# Patient Record
Sex: FEMALE | URBAN - METROPOLITAN AREA
[De-identification: names, ages, dates, MRNs, and addresses within clinical notes are randomized per-mention and may not be internally consistent; named-entity substitution may affect disease eponyms.]

---

## 2022-02-28 ENCOUNTER — APPOINTMENT (RX ONLY)
Dept: URBAN - METROPOLITAN AREA CLINIC 164 | Facility: CLINIC | Age: 72
Setting detail: DERMATOLOGY
End: 2022-02-28

## 2022-02-28 DIAGNOSIS — D22 MELANOCYTIC NEVI: ICD-10-CM

## 2022-02-28 DIAGNOSIS — D18.0 HEMANGIOMA: ICD-10-CM

## 2022-02-28 DIAGNOSIS — L81.4 OTHER MELANIN HYPERPIGMENTATION: ICD-10-CM

## 2022-02-28 DIAGNOSIS — L82.1 OTHER SEBORRHEIC KERATOSIS: ICD-10-CM

## 2022-02-28 PROBLEM — D22.5 MELANOCYTIC NEVI OF TRUNK: Status: ACTIVE | Noted: 2022-02-28

## 2022-02-28 PROBLEM — D18.01 HEMANGIOMA OF SKIN AND SUBCUTANEOUS TISSUE: Status: ACTIVE | Noted: 2022-02-28

## 2022-02-28 PROCEDURE — ? COUNSELING

## 2022-02-28 PROCEDURE — ? FULL BODY SKIN EXAM

## 2022-02-28 PROCEDURE — 99203 OFFICE O/P NEW LOW 30 MIN: CPT

## 2022-02-28 ASSESSMENT — LOCATION DETAILED DESCRIPTION DERM
LOCATION DETAILED: RIGHT MID-UPPER BACK
LOCATION DETAILED: RIGHT SUPERIOR UPPER BACK
LOCATION DETAILED: LEFT MID-UPPER BACK
LOCATION DETAILED: EPIGASTRIC SKIN

## 2022-02-28 ASSESSMENT — LOCATION SIMPLE DESCRIPTION DERM
LOCATION SIMPLE: ABDOMEN
LOCATION SIMPLE: RIGHT UPPER BACK
LOCATION SIMPLE: LEFT UPPER BACK

## 2022-02-28 ASSESSMENT — LOCATION ZONE DERM: LOCATION ZONE: TRUNK

## 2022-02-28 NOTE — HPI: EVALUATION OF SKIN LESION(S)
What Type Of Note Output Would You Prefer (Optional)?: Bullet Format
Hpi Title: Evaluation of Skin Lesions
How Severe Are Your Spot(S)?: mild
Have Your Spot(S) Been Treated In The Past?: has not been treated
Additional History: Patient is here for a full body exam

## 2024-01-25 ENCOUNTER — APPOINTMENT (RX ONLY)
Dept: URBAN - METROPOLITAN AREA CLINIC 164 | Facility: CLINIC | Age: 74
Setting detail: DERMATOLOGY
End: 2024-01-25

## 2024-01-25 DIAGNOSIS — D22 MELANOCYTIC NEVI: ICD-10-CM

## 2024-01-25 DIAGNOSIS — Z12.83 ENCOUNTER FOR SCREENING FOR MALIGNANT NEOPLASM OF SKIN: ICD-10-CM

## 2024-01-25 DIAGNOSIS — L81.4 OTHER MELANIN HYPERPIGMENTATION: ICD-10-CM

## 2024-01-25 DIAGNOSIS — L82.0 INFLAMED SEBORRHEIC KERATOSIS: ICD-10-CM

## 2024-01-25 DIAGNOSIS — D18.0 HEMANGIOMA: ICD-10-CM

## 2024-01-25 PROBLEM — D18.01 HEMANGIOMA OF SKIN AND SUBCUTANEOUS TISSUE: Status: ACTIVE | Noted: 2024-01-25

## 2024-01-25 PROBLEM — D22.5 MELANOCYTIC NEVI OF TRUNK: Status: ACTIVE | Noted: 2024-01-25

## 2024-01-25 PROCEDURE — ? FULL BODY SKIN EXAM

## 2024-01-25 PROCEDURE — ? COUNSELING

## 2024-01-25 PROCEDURE — ? LIQUID NITROGEN

## 2024-01-25 PROCEDURE — 99213 OFFICE O/P EST LOW 20 MIN: CPT | Mod: 25

## 2024-01-25 PROCEDURE — 17110 DESTRUCTION B9 LES UP TO 14: CPT

## 2024-01-25 ASSESSMENT — LOCATION SIMPLE DESCRIPTION DERM
LOCATION SIMPLE: RIGHT FOREHEAD
LOCATION SIMPLE: ABDOMEN
LOCATION SIMPLE: CHEST
LOCATION SIMPLE: SCALP
LOCATION SIMPLE: LEFT UPPER BACK
LOCATION SIMPLE: RIGHT UPPER BACK

## 2024-01-25 ASSESSMENT — LOCATION DETAILED DESCRIPTION DERM
LOCATION DETAILED: PERIUMBILICAL SKIN
LOCATION DETAILED: LEFT LATERAL SUPERIOR CHEST
LOCATION DETAILED: RIGHT CENTRAL PARIETAL SCALP
LOCATION DETAILED: RIGHT SUPERIOR FOREHEAD
LOCATION DETAILED: RIGHT MID-UPPER BACK
LOCATION DETAILED: LEFT MID-UPPER BACK

## 2024-01-25 ASSESSMENT — LOCATION ZONE DERM
LOCATION ZONE: FACE
LOCATION ZONE: SCALP
LOCATION ZONE: TRUNK

## 2024-01-25 NOTE — PROCEDURE: LIQUID NITROGEN
Render Note In Bullet Format When Appropriate: No
Medical Necessity Information: It is in your best interest to select a reason for this procedure from the list below. All of these items fulfill various CMS LCD requirements except the new and changing color options.
Spray Paint Text: The liquid nitrogen was applied to the skin utilizing a spray paint frosting technique.
Show Spray Paint Technique Variable?: Yes
Medical Necessity Clause: This procedure was medically necessary because the lesions that were treated were:
Post-Care Instructions: I reviewed with the patient in detail post-care instructions. Patient is to wear sunprotection, and avoid picking at any of the treated lesions. Pt may apply Vaseline to crusted or scabbing areas.
Consent: The patient's consent was obtained including but not limited to risks of crusting, scabbing, blistering, scarring, darker or lighter pigmentary change, recurrence, incomplete removal and infection.
Detail Level: Detailed

## 2024-06-23 ENCOUNTER — HOSPITAL ENCOUNTER (INPATIENT)
Facility: CLINIC | Age: 74
LOS: 11 days | Discharge: HOME | DRG: 329 | End: 2024-07-06
Admitting: INTERNAL MEDICINE
Payer: MEDICARE

## 2024-06-23 ENCOUNTER — APPOINTMENT (OUTPATIENT)
Dept: CT IMAGING | Facility: CLINIC | Age: 74
DRG: 329 | End: 2024-06-23
Payer: MEDICARE

## 2024-06-23 DIAGNOSIS — K56.609 SMALL BOWEL OBSTRUCTION (H): ICD-10-CM

## 2024-06-23 DIAGNOSIS — D72.829 LEUKOCYTOSIS: ICD-10-CM

## 2024-06-23 DIAGNOSIS — R91.8 PULMONARY NODULES: ICD-10-CM

## 2024-06-23 DIAGNOSIS — K52.9 ENTEROCOLITIS: Primary | ICD-10-CM

## 2024-06-23 DIAGNOSIS — R73.9 HYPERGLYCEMIA: ICD-10-CM

## 2024-06-23 DIAGNOSIS — R19.7 DIARRHEA, UNSPECIFIED TYPE: ICD-10-CM

## 2024-06-23 LAB
ALBUMIN SERPL BCG-MCNC: 4.1 G/DL (ref 3.5–5.2)
ALP SERPL-CCNC: 63 U/L (ref 40–150)
ALT SERPL W P-5'-P-CCNC: 13 U/L (ref 0–50)
ANION GAP SERPL CALCULATED.3IONS-SCNC: 14 MMOL/L (ref 7–15)
AST SERPL W P-5'-P-CCNC: 21 U/L (ref 0–45)
BASOPHILS # BLD AUTO: 0 10E3/UL (ref 0–0.2)
BASOPHILS NFR BLD AUTO: 0 %
BILIRUB SERPL-MCNC: 0.2 MG/DL
BUN SERPL-MCNC: 12.7 MG/DL (ref 8–23)
CALCIUM SERPL-MCNC: 9.2 MG/DL (ref 8.8–10.2)
CHLORIDE SERPL-SCNC: 101 MMOL/L (ref 98–107)
CREAT SERPL-MCNC: 0.89 MG/DL (ref 0.51–0.95)
DEPRECATED HCO3 PLAS-SCNC: 22 MMOL/L (ref 22–29)
EGFRCR SERPLBLD CKD-EPI 2021: 68 ML/MIN/1.73M2
EOSINOPHIL # BLD AUTO: 0.2 10E3/UL (ref 0–0.7)
EOSINOPHIL NFR BLD AUTO: 2 %
ERYTHROCYTE [DISTWIDTH] IN BLOOD BY AUTOMATED COUNT: 13.2 % (ref 10–15)
GLUCOSE SERPL-MCNC: 218 MG/DL (ref 70–99)
HCT VFR BLD AUTO: 35.5 % (ref 35–47)
HGB BLD-MCNC: 11.4 G/DL (ref 11.7–15.7)
HOLD SPECIMEN: NORMAL
IMM GRANULOCYTES # BLD: 0 10E3/UL
IMM GRANULOCYTES NFR BLD: 0 %
LACTATE SERPL-SCNC: 1 MMOL/L (ref 0.7–2)
LIPASE SERPL-CCNC: 65 U/L (ref 13–60)
LYMPHOCYTES # BLD AUTO: 1.7 10E3/UL (ref 0.8–5.3)
LYMPHOCYTES NFR BLD AUTO: 15 %
MCH RBC QN AUTO: 30 PG (ref 26.5–33)
MCHC RBC AUTO-ENTMCNC: 32.1 G/DL (ref 31.5–36.5)
MCV RBC AUTO: 93 FL (ref 78–100)
MONOCYTES # BLD AUTO: 0.3 10E3/UL (ref 0–1.3)
MONOCYTES NFR BLD AUTO: 2 %
NEUTROPHILS # BLD AUTO: 9.1 10E3/UL (ref 1.6–8.3)
NEUTROPHILS NFR BLD AUTO: 80 %
NRBC # BLD AUTO: 0 10E3/UL
NRBC BLD AUTO-RTO: 0 /100
PLATELET # BLD AUTO: 357 10E3/UL (ref 150–450)
POTASSIUM SERPL-SCNC: 4.5 MMOL/L (ref 3.4–5.3)
PROT SERPL-MCNC: 6.7 G/DL (ref 6.4–8.3)
RBC # BLD AUTO: 3.8 10E6/UL (ref 3.8–5.2)
SODIUM SERPL-SCNC: 137 MMOL/L (ref 135–145)
WBC # BLD AUTO: 11.4 10E3/UL (ref 4–11)

## 2024-06-23 PROCEDURE — 250N000011 HC RX IP 250 OP 636: Mod: JZ

## 2024-06-23 PROCEDURE — 36415 COLL VENOUS BLD VENIPUNCTURE: CPT | Performed by: EMERGENCY MEDICINE

## 2024-06-23 PROCEDURE — 96374 THER/PROPH/DIAG INJ IV PUSH: CPT

## 2024-06-23 PROCEDURE — 250N000013 HC RX MED GY IP 250 OP 250 PS 637: Performed by: PHYSICIAN ASSISTANT

## 2024-06-23 PROCEDURE — 250N000011 HC RX IP 250 OP 636: Mod: JZ | Performed by: EMERGENCY MEDICINE

## 2024-06-23 PROCEDURE — 96375 TX/PRO/DX INJ NEW DRUG ADDON: CPT

## 2024-06-23 PROCEDURE — 36415 COLL VENOUS BLD VENIPUNCTURE: CPT

## 2024-06-23 PROCEDURE — 250N000011 HC RX IP 250 OP 636

## 2024-06-23 PROCEDURE — 74177 CT ABD & PELVIS W/CONTRAST: CPT

## 2024-06-23 PROCEDURE — 99222 1ST HOSP IP/OBS MODERATE 55: CPT | Mod: AI | Performed by: PHYSICIAN ASSISTANT

## 2024-06-23 PROCEDURE — 80053 COMPREHEN METABOLIC PANEL: CPT | Performed by: EMERGENCY MEDICINE

## 2024-06-23 PROCEDURE — 250N000011 HC RX IP 250 OP 636: Mod: JZ | Performed by: PHYSICIAN ASSISTANT

## 2024-06-23 PROCEDURE — 99285 EMERGENCY DEPT VISIT HI MDM: CPT | Mod: 25

## 2024-06-23 PROCEDURE — 96361 HYDRATE IV INFUSION ADD-ON: CPT

## 2024-06-23 PROCEDURE — G0378 HOSPITAL OBSERVATION PER HR: HCPCS

## 2024-06-23 PROCEDURE — 83605 ASSAY OF LACTIC ACID: CPT

## 2024-06-23 PROCEDURE — 85025 COMPLETE CBC W/AUTO DIFF WBC: CPT | Performed by: EMERGENCY MEDICINE

## 2024-06-23 PROCEDURE — 258N000003 HC RX IP 258 OP 636: Mod: JZ | Performed by: EMERGENCY MEDICINE

## 2024-06-23 PROCEDURE — 96376 TX/PRO/DX INJ SAME DRUG ADON: CPT

## 2024-06-23 PROCEDURE — 85025 COMPLETE CBC W/AUTO DIFF WBC: CPT

## 2024-06-23 PROCEDURE — 258N000003 HC RX IP 258 OP 636: Mod: JZ | Performed by: PHYSICIAN ASSISTANT

## 2024-06-23 PROCEDURE — 83690 ASSAY OF LIPASE: CPT

## 2024-06-23 PROCEDURE — 80053 COMPREHEN METABOLIC PANEL: CPT

## 2024-06-23 RX ORDER — ACETAMINOPHEN 325 MG/1
650 TABLET ORAL EVERY 4 HOURS PRN
Status: DISCONTINUED | OUTPATIENT
Start: 2024-06-23 | End: 2024-06-24

## 2024-06-23 RX ORDER — CALCIUM CARB/VITAMIN D3/VIT K1 500-500-40
2 TABLET,CHEWABLE ORAL EVERY MORNING
COMMUNITY

## 2024-06-23 RX ORDER — ONDANSETRON 2 MG/ML
4 INJECTION INTRAMUSCULAR; INTRAVENOUS EVERY 6 HOURS PRN
Status: DISCONTINUED | OUTPATIENT
Start: 2024-06-23 | End: 2024-07-06 | Stop reason: HOSPADM

## 2024-06-23 RX ORDER — SERTRALINE HYDROCHLORIDE 100 MG/1
100 TABLET, FILM COATED ORAL EVERY MORNING
Status: DISCONTINUED | OUTPATIENT
Start: 2024-06-24 | End: 2024-07-06 | Stop reason: HOSPADM

## 2024-06-23 RX ORDER — NALOXONE HYDROCHLORIDE 0.4 MG/ML
0.4 INJECTION, SOLUTION INTRAMUSCULAR; INTRAVENOUS; SUBCUTANEOUS
Status: DISCONTINUED | OUTPATIENT
Start: 2024-06-23 | End: 2024-07-06 | Stop reason: HOSPADM

## 2024-06-23 RX ORDER — HYDROXYZINE HYDROCHLORIDE 50 MG/1
50 TABLET, FILM COATED ORAL EVERY 6 HOURS PRN
COMMUNITY
Start: 2024-06-21

## 2024-06-23 RX ORDER — HYDROXYZINE HYDROCHLORIDE 50 MG/1
50 TABLET, FILM COATED ORAL EVERY 6 HOURS PRN
Status: DISCONTINUED | OUTPATIENT
Start: 2024-06-23 | End: 2024-06-23

## 2024-06-23 RX ORDER — LACTOBACILLUS RHAMNOSUS GG 10B CELL
1 CAPSULE ORAL 2 TIMES DAILY
Status: DISCONTINUED | OUTPATIENT
Start: 2024-06-23 | End: 2024-07-06 | Stop reason: HOSPADM

## 2024-06-23 RX ORDER — ACETAMINOPHEN 650 MG/1
650 SUPPOSITORY RECTAL EVERY 4 HOURS PRN
Status: DISCONTINUED | OUTPATIENT
Start: 2024-06-23 | End: 2024-06-24

## 2024-06-23 RX ORDER — PROCHLORPERAZINE MALEATE 5 MG
5 TABLET ORAL EVERY 6 HOURS PRN
Status: DISCONTINUED | OUTPATIENT
Start: 2024-06-23 | End: 2024-07-06 | Stop reason: HOSPADM

## 2024-06-23 RX ORDER — GABAPENTIN 300 MG/1
900 CAPSULE ORAL 3 TIMES DAILY
COMMUNITY
Start: 2024-04-15

## 2024-06-23 RX ORDER — HYDROMORPHONE HCL IN WATER/PF 6 MG/30 ML
0.2 PATIENT CONTROLLED ANALGESIA SYRINGE INTRAVENOUS
Status: DISCONTINUED | OUTPATIENT
Start: 2024-06-23 | End: 2024-07-06 | Stop reason: HOSPADM

## 2024-06-23 RX ORDER — SERTRALINE HYDROCHLORIDE 100 MG/1
100 TABLET, FILM COATED ORAL EVERY MORNING
COMMUNITY
Start: 2023-05-25

## 2024-06-23 RX ORDER — UREA 10 %
1 LOTION (ML) TOPICAL EVERY MORNING
COMMUNITY
Start: 2023-06-21 | End: 2024-06-23

## 2024-06-23 RX ORDER — PROCHLORPERAZINE 25 MG
12.5 SUPPOSITORY, RECTAL RECTAL EVERY 12 HOURS PRN
Status: DISCONTINUED | OUTPATIENT
Start: 2024-06-23 | End: 2024-07-06 | Stop reason: HOSPADM

## 2024-06-23 RX ORDER — IOPAMIDOL 755 MG/ML
90 INJECTION, SOLUTION INTRAVASCULAR ONCE
Status: COMPLETED | OUTPATIENT
Start: 2024-06-23 | End: 2024-06-23

## 2024-06-23 RX ORDER — ONDANSETRON 2 MG/ML
4 INJECTION INTRAMUSCULAR; INTRAVENOUS ONCE
Status: COMPLETED | OUTPATIENT
Start: 2024-06-23 | End: 2024-06-23

## 2024-06-23 RX ORDER — NALOXONE HYDROCHLORIDE 0.4 MG/ML
0.2 INJECTION, SOLUTION INTRAMUSCULAR; INTRAVENOUS; SUBCUTANEOUS
Status: DISCONTINUED | OUTPATIENT
Start: 2024-06-23 | End: 2024-07-06 | Stop reason: HOSPADM

## 2024-06-23 RX ORDER — PROPRANOLOL HYDROCHLORIDE 20 MG/1
20 TABLET ORAL 3 TIMES DAILY PRN
COMMUNITY
Start: 2024-06-21

## 2024-06-23 RX ORDER — GABAPENTIN 300 MG/1
900 CAPSULE ORAL 3 TIMES DAILY
Status: DISCONTINUED | OUTPATIENT
Start: 2024-06-23 | End: 2024-07-06 | Stop reason: HOSPADM

## 2024-06-23 RX ORDER — ACETAMINOPHEN 500 MG
1000 TABLET ORAL EVERY 6 HOURS
Status: ON HOLD | COMMUNITY
End: 2024-07-06

## 2024-06-23 RX ORDER — SODIUM CHLORIDE 9 MG/ML
INJECTION, SOLUTION INTRAVENOUS CONTINUOUS
Status: ACTIVE | OUTPATIENT
Start: 2024-06-23 | End: 2024-06-24

## 2024-06-23 RX ORDER — MORPHINE SULFATE 4 MG/ML
4 INJECTION, SOLUTION INTRAMUSCULAR; INTRAVENOUS ONCE
Status: COMPLETED | OUTPATIENT
Start: 2024-06-23 | End: 2024-06-23

## 2024-06-23 RX ORDER — HYDROMORPHONE HYDROCHLORIDE 2 MG/1
2 TABLET ORAL EVERY 4 HOURS PRN
Status: DISCONTINUED | OUTPATIENT
Start: 2024-06-23 | End: 2024-07-06 | Stop reason: HOSPADM

## 2024-06-23 RX ORDER — METOCLOPRAMIDE HYDROCHLORIDE 5 MG/ML
10 INJECTION INTRAMUSCULAR; INTRAVENOUS ONCE
Status: COMPLETED | OUTPATIENT
Start: 2024-06-23 | End: 2024-06-23

## 2024-06-23 RX ORDER — ACETAMINOPHEN 500 MG
1000 TABLET ORAL EVERY 6 HOURS
Status: DISCONTINUED | OUTPATIENT
Start: 2024-06-23 | End: 2024-06-30 | Stop reason: ALTCHOICE

## 2024-06-23 RX ORDER — PROPRANOLOL HYDROCHLORIDE 20 MG/1
20 TABLET ORAL 3 TIMES DAILY PRN
Status: DISCONTINUED | OUTPATIENT
Start: 2024-06-23 | End: 2024-07-06 | Stop reason: HOSPADM

## 2024-06-23 RX ORDER — HYDROMORPHONE HCL IN WATER/PF 6 MG/30 ML
0.4 PATIENT CONTROLLED ANALGESIA SYRINGE INTRAVENOUS
Status: DISCONTINUED | OUTPATIENT
Start: 2024-06-23 | End: 2024-07-06 | Stop reason: HOSPADM

## 2024-06-23 RX ORDER — ONDANSETRON 4 MG/1
4 TABLET, ORALLY DISINTEGRATING ORAL EVERY 6 HOURS PRN
Status: DISCONTINUED | OUTPATIENT
Start: 2024-06-23 | End: 2024-07-06 | Stop reason: HOSPADM

## 2024-06-23 RX ORDER — ONDANSETRON 2 MG/ML
4 INJECTION INTRAMUSCULAR; INTRAVENOUS
Status: COMPLETED | OUTPATIENT
Start: 2024-06-23 | End: 2024-06-23

## 2024-06-23 RX ORDER — MULTIVIT-MINERALS/FOLIC ACID 200 MCG
2 TABLET,CHEWABLE ORAL DAILY
COMMUNITY
End: 2024-06-23

## 2024-06-23 RX ADMIN — ONDANSETRON 4 MG: 2 INJECTION INTRAMUSCULAR; INTRAVENOUS at 17:44

## 2024-06-23 RX ADMIN — SODIUM CHLORIDE: 9 INJECTION, SOLUTION INTRAVENOUS at 14:35

## 2024-06-23 RX ADMIN — GABAPENTIN 900 MG: 300 CAPSULE ORAL at 20:34

## 2024-06-23 RX ADMIN — IOPAMIDOL 90 ML: 755 INJECTION, SOLUTION INTRAVENOUS at 09:39

## 2024-06-23 RX ADMIN — HYDROMORPHONE HYDROCHLORIDE 0.4 MG: 0.2 INJECTION, SOLUTION INTRAMUSCULAR; INTRAVENOUS; SUBCUTANEOUS at 14:41

## 2024-06-23 RX ADMIN — ACETAMINOPHEN 1000 MG: 500 TABLET, FILM COATED ORAL at 20:33

## 2024-06-23 RX ADMIN — SODIUM CHLORIDE 1000 ML: 9 INJECTION, SOLUTION INTRAVENOUS at 08:34

## 2024-06-23 RX ADMIN — METOCLOPRAMIDE 10 MG: 5 INJECTION, SOLUTION INTRAMUSCULAR; INTRAVENOUS at 11:22

## 2024-06-23 RX ADMIN — PROPRANOLOL HYDROCHLORIDE 20 MG: 10 TABLET ORAL at 20:36

## 2024-06-23 RX ADMIN — ONDANSETRON 4 MG: 2 INJECTION INTRAMUSCULAR; INTRAVENOUS at 08:33

## 2024-06-23 RX ADMIN — PROCHLORPERAZINE MALEATE 5 MG: 5 TABLET ORAL at 21:28

## 2024-06-23 RX ADMIN — ONDANSETRON 4 MG: 2 INJECTION INTRAMUSCULAR; INTRAVENOUS at 09:25

## 2024-06-23 RX ADMIN — ONDANSETRON 4 MG: 4 TABLET, ORALLY DISINTEGRATING ORAL at 23:53

## 2024-06-23 RX ADMIN — Medication 1 CAPSULE: at 20:34

## 2024-06-23 RX ADMIN — GABAPENTIN 900 MG: 300 CAPSULE ORAL at 14:41

## 2024-06-23 RX ADMIN — ACETAMINOPHEN 1000 MG: 500 TABLET, FILM COATED ORAL at 14:40

## 2024-06-23 RX ADMIN — MORPHINE SULFATE 4 MG: 4 INJECTION, SOLUTION INTRAMUSCULAR; INTRAVENOUS at 09:25

## 2024-06-23 RX ADMIN — HYDROMORPHONE HYDROCHLORIDE 1 MG: 2 TABLET ORAL at 21:28

## 2024-06-23 RX ADMIN — SODIUM CHLORIDE: 9 INJECTION, SOLUTION INTRAVENOUS at 23:43

## 2024-06-23 RX ADMIN — HYDROMORPHONE HYDROCHLORIDE 0.2 MG: 0.2 INJECTION, SOLUTION INTRAMUSCULAR; INTRAVENOUS; SUBCUTANEOUS at 17:45

## 2024-06-23 ASSESSMENT — ACTIVITIES OF DAILY LIVING (ADL)
DRESSING/BATHING_DIFFICULTY: NO
ADLS_ACUITY_SCORE: 35
FALL_HISTORY_WITHIN_LAST_SIX_MONTHS: NO
ADLS_ACUITY_SCORE: 35
ADLS_ACUITY_SCORE: 35
CONCENTRATING,_REMEMBERING_OR_MAKING_DECISIONS_DIFFICULTY: NO
ADLS_ACUITY_SCORE: 35
CHANGE_IN_FUNCTIONAL_STATUS_SINCE_ONSET_OF_CURRENT_ILLNESS/INJURY: NO
ADLS_ACUITY_SCORE: 35
ADLS_ACUITY_SCORE: 35
DOING_ERRANDS_INDEPENDENTLY_DIFFICULTY: NO
ADLS_ACUITY_SCORE: 35
ADLS_ACUITY_SCORE: 35
DIFFICULTY_EATING/SWALLOWING: NO
ADLS_ACUITY_SCORE: 35
ADLS_ACUITY_SCORE: 33
TOILETING_ISSUES: NO
ADLS_ACUITY_SCORE: 35
WALKING_OR_CLIMBING_STAIRS_DIFFICULTY: NO
ADLS_ACUITY_SCORE: 35
ADLS_ACUITY_SCORE: 35

## 2024-06-23 ASSESSMENT — COLUMBIA-SUICIDE SEVERITY RATING SCALE - C-SSRS
6. HAVE YOU EVER DONE ANYTHING, STARTED TO DO ANYTHING, OR PREPARED TO DO ANYTHING TO END YOUR LIFE?: NO
1. IN THE PAST MONTH, HAVE YOU WISHED YOU WERE DEAD OR WISHED YOU COULD GO TO SLEEP AND NOT WAKE UP?: NO
2. HAVE YOU ACTUALLY HAD ANY THOUGHTS OF KILLING YOURSELF IN THE PAST MONTH?: NO

## 2024-06-23 NOTE — ED PROVIDER NOTES
Emergency Department Note      History of Present Illness     Chief Complaint  Abdominal Pain and Nausea, Vomiting, & Diarrhea    HPI  Yoselin Zelaya is a 73 year old female with history of gastric bypass ~15 years ago (Soren en Y) who presents at the emergency department with abdominal pain, nausea, vomiting, and diarrhea. The patient reports that at 0300 this morning she experienced a sudden onset of abdominal pain, vomiting, nausea, and diarrhea.  Spouse and son in law with similar symptoms. Patient denies blood in vomit or stool and fevers. She mentions a blepharoplasty surgery was performed last week, and that she underwent gastric bypass surgery about 20 years ago. Patient is not on antibiotics.  No dysuria, hematuria or urinary frequency.    Independent Historian   as detailed above.    Review of External Notes  Pre operative visit from 6/13/24.    Past Medical History   Medical History and Problem List  Depression   Osteoporosis   Subarachnoid hemorrhage  Iron deficiency anemia   TIA  Migraine  Asthma    Medications  Fosamax  Sinequan  Zoloft  Inderal  Heriberto     Surgical History   Soren-en-y  Bilateral breast implants  Lumbar fusion  Carpal tunnel release   Cervical fusion  Hysterectomy   Appendectomy   Tonsillectomy   Dilation and curettage   Rotator cuff repair     Physical Exam   Patient Vitals for the past 24 hrs:   BP Temp Temp src Pulse Resp SpO2 Height Weight   06/23/24 1300 (!) 150/84 -- -- 71 -- -- -- --   06/23/24 1259 -- -- -- -- -- 94 % -- --   06/23/24 1235 (!) 154/75 -- -- -- -- 95 % -- --   06/23/24 1220 (!) 144/79 -- -- -- -- 95 % -- --   06/23/24 1215 (!) 144/79 -- -- 69 -- 95 % -- --   06/23/24 1205 (!) 146/74 -- -- -- -- 96 % -- --   06/23/24 1150 (!) 142/70 -- -- -- -- 96 % -- --   06/23/24 1145 (!) 142/70 -- -- 68 -- 95 % -- --   06/23/24 1132 (!) 154/77 -- -- 69 -- 93 % -- --   06/23/24 1118 (!) 155/77 -- -- 66 -- 95 % -- --   06/23/24 1117 (!) 155/77 -- -- 66 -- 93 % -- --  "  06/23/24 1103 (!) 153/85 -- -- 70 -- 94 % -- --   06/23/24 1052 (!) 144/76 -- -- -- -- 95 % -- --   06/23/24 1037 139/68 -- -- -- -- 100 % -- --   06/23/24 1022 139/67 -- -- -- -- 95 % -- --   06/23/24 1007 128/57 -- -- -- -- 93 % -- --   06/23/24 0950 -- -- -- -- -- 96 % -- --   06/23/24 0949 -- -- -- -- -- 97 % -- --   06/23/24 0948 132/61 -- -- 74 -- -- -- --   06/23/24 0945 (!) 127/107 -- -- 71 -- -- -- --   06/23/24 0930 120/55 -- -- 65 -- 94 % -- --   06/23/24 0927 130/59 -- -- 62 -- -- -- --   06/23/24 0921 -- -- -- -- -- (!) 86 % -- --   06/23/24 0908 126/70 -- -- 59 -- 92 % -- --   06/23/24 0831 131/79 -- -- 74 16 -- -- --   06/23/24 0828 -- -- -- -- -- -- 1.6 m (5' 3\") 80.7 kg (178 lb)   06/23/24 0827 117/60 97  F (36.1  C) Temporal 57 20 99 % -- --     Physical Exam  BP (!) 150/84   Pulse 71   Temp 97  F (36.1  C) (Temporal)   Resp 16   Ht 1.6 m (5' 3\")   Wt 80.7 kg (178 lb)   SpO2 94%   BMI 31.53 kg/m     General: Appears uncomfortable. Examined in room 36.  Accompanied by spouse.  Head: Atraumatic. Normocephalic.  EENT: PERRL. EOMI. Moist mucus membranes. Surgical sites on upper eyes and foreheads well appearing without any erythema, edema, or drainage.  CV: Regular rate and rhythm.   Respiratory: Breathing comfortably on room air. Lungs clear to auscultation bilaterally without wheezes, rhonchi, or rales.  GI: Soft, non-distended. Diffusely tender abdomen to palpation. No rebound, rigidity, or guarding.   Msk: Extremities without tenderness to palpation or deformity.  Skin: Warm and dry. No rashes.  Neuro: Awake, alert, and conversant. No focal neurologic deficits.   Psych: Appropriate mood and affect.    Diagnostics   Lab Results   Labs Ordered and Resulted from Time of ED Arrival to Time of ED Departure   COMPREHENSIVE METABOLIC PANEL - Abnormal       Result Value    Sodium 137      Potassium 4.5      Carbon Dioxide (CO2) 22      Anion Gap 14      Urea Nitrogen 12.7      Creatinine 0.89      " GFR Estimate 68      Calcium 9.2      Chloride 101      Glucose 218 (*)     Alkaline Phosphatase 63      AST 21      ALT 13      Protein Total 6.7      Albumin 4.1      Bilirubin Total 0.2     CBC WITH PLATELETS AND DIFFERENTIAL - Abnormal    WBC Count 11.4 (*)     RBC Count 3.80      Hemoglobin 11.4 (*)     Hematocrit 35.5      MCV 93      MCH 30.0      MCHC 32.1      RDW 13.2      Platelet Count 357      % Neutrophils 80      % Lymphocytes 15      % Monocytes 2      % Eosinophils 2      % Basophils 0      % Immature Granulocytes 0      NRBCs per 100 WBC 0      Absolute Neutrophils 9.1 (*)     Absolute Lymphocytes 1.7      Absolute Monocytes 0.3      Absolute Eosinophils 0.2      Absolute Basophils 0.0      Absolute Immature Granulocytes 0.0      Absolute NRBCs 0.0     LIPASE - Abnormal    Lipase 65 (*)    LACTIC ACID WHOLE BLOOD - Normal    Lactic Acid 1.0       Imaging  Abd/pelvis CT,  IV  contrast only TRAUMA / AAA   Final Result   IMPRESSION:    1.  Findings of acute enterocolitis with a few thick-walled edematous and mildly dilated loops of small bowel in the pelvis, and a few segments of colon with wall thickening. Mesenteric edema and small amount of free fluid in the pelvis and right upper    quadrant. Findings may be due to infectious or inflammatory enterocolitis.   2.  Few small pulmonary nodules measuring up to 3 mm in the right middle lobe. Consider a follow-up chest CT in 12 months.        Independent Interpretation  None    ED Course    Medications Administered  Medications   ondansetron (ZOFRAN) injection 4 mg (4 mg Intravenous $Given 6/23/24 0833)   sodium chloride 0.9% BOLUS 1,000 mL (0 mLs Intravenous Stopped 6/23/24 1053)   ondansetron (ZOFRAN) injection 4 mg (4 mg Intravenous $Given 6/23/24 0925)   morphine (PF) injection 4 mg (4 mg Intravenous $Given 6/23/24 0925)   iopamidol (ISOVUE-370) solution 90 mL (90 mLs Intravenous $Given 6/23/24 0939)   sodium chloride (PF) 0.9% PF flush 62 mL (62 mLs  Intravenous $Given 6/23/24 0940)   metoclopramide (REGLAN) injection 10 mg (10 mg Intravenous $Given 6/23/24 1122)     Procedures  Procedures     Discussion of Management  Admitting Hospitalist, Sybil Dolan PA-C and Dr. Garcia    Social Determinants of Health adding to complexity of care  None    ED Course  ED Course as of 06/23/24 1316   Sun Jun 23, 2024   0904 I obtained history and examined the patient as noted above.     1006 Abd/pelvis CT,  IV  contrast only TRAUMA / AAA   1019 I rechecked and updated the patient.     1048 I rechecked and updated the patient. She is still having pain.   1053 I staffed the patient with Dr. Golden.   1053 I rechecked and updated the patient once again. We spoke about admission.   1123 I obtained history and examined the patient as noted above     1218 I spoke with Sybil Sierra PA-C regarding admitting the patient. She accepted on behalf of Dr. Garcia.       Medical Decision Making / Diagnosis   CMS Diagnoses: None    MIPS     None    [facility]  Yoselin Zelaya is a 73 year old female presenting today for evaluation of abdominal pain, nausea, vomiting and diarrhea since 3 AM last night.  On arrival, patient in significant discomfort but vital signs are stable.  Differential included colitis, infectious gastroenteritis, small bowel obstruction, complication related to previous Soren-en-Y.  Lab work notable for slight leukocytosis and hyperglycemia.  CT scan suggestive of an enterocolitis, no evidence of small bowel obstruction or other complication related to Soren-en-Y.  The above interventions were given here however with brief improvement, but she would shortly do after have drastic worsening of her pain and nausea.  Due to intractable pain, nausea and vomiting, discussed admission to the observation unit for further pain management and management of her nausea.    Disposition  The patient was admitted to the hospital.     ICD-10 Codes:    ICD-10-CM    1. Enterocolitis  K52.9        2. Pulmonary nodules  R91.8       3. Hyperglycemia  R73.9       4. Leukocytosis  D72.829          Scribe Disclosure:  I, Ct Britt, am serving as a scribe at 12:28 PM on 6/23/2024 to document services personally performed by Elly Blanchard PA-C  based on my observations and the provider's statements to me.    Scribe Disclosure:  I, Erica Cota, am serving as a scribe at 12:22 PM on 6/23/2024 to document services personally performed by Elly Blanchard PA-C based on my observations and the provider's statements to me.        Elly Blanchard PA-C  06/23/24 4059

## 2024-06-23 NOTE — ED TRIAGE NOTES
Pt arrives with c/o N/V/D that started at 0300. Pt believes she has food poisoning. Pt visibly distressed in triage.      Triage Assessment (Adult)       Row Name 06/23/24 0873          Triage Assessment    Airway WDL WDL        Respiratory WDL    Respiratory WDL WDL        Skin Circulation/Temperature WDL    Skin Circulation/Temperature WDL WDL        Cardiac WDL    Cardiac WDL WDL        Peripheral/Neurovascular WDL    Peripheral Neurovascular WDL WDL        Cognitive/Neuro/Behavioral WDL    Cognitive/Neuro/Behavioral WDL WDL

## 2024-06-23 NOTE — PLAN OF CARE
PRIMARY DIAGNOSIS: Entercolitis  OUTPATIENT/OBSERVATION GOALS TO BE MET BEFORE DISCHARGE:  1. Pain Status: Improved-controlled with oral pain medications.    2. Return to near baseline physical activity: No    3. Cleared for discharge by consultants (if involved): No    Discharge Planner Nurse   Safe discharge environment identified: Yes  Barriers to discharge: Yes       Entered by: Nai Nielson RN 06/23/2024 4:52 PM     Please review provider order for any additional goals.   Nurse to notify provider when observation goals have been met and patient is ready for discharge.Goal Outcome Evaluation:

## 2024-06-23 NOTE — PLAN OF CARE
"Goal Outcome Evaluation:      Plan of Care Reviewed With: patient, spouse    Overall Patient Progress: improvingOverall Patient Progress: improving    Outcome Evaluation: 7/10 abdominal pain, denies nausea, stool sample needed    PRIMARY DIAGNOSIS: GASTROENTERITIS    OUTPATIENT/OBSERVATION GOALS TO BE MET BEFORE DISCHARGE  1. Orthostatic performed: N/A    2. Tolerating PO fluid and/or antibiotics (if applicable): No, clear liquid diet entered and encouraged. ADAT.     3. Nausea/Vomiting/Diarrhea symptoms improved: Yes, denies nausea at this time.     4. Pain status: Improved but still requiring IV narcotics.    5. Return to near baseline physical activity: No, not OOB yet but anticipate 1A based on ED note.     Discharge Planner Nurse   Safe discharge environment identified: Yes  Barriers to discharge: Yes       Entered by: Aimee Rawls RN 06/23/2024 2:47 PM     Please review provider order for any additional goals.   Nurse to notify provider when observation goals have been met and patient is ready for discharge.                                                          Problem: Adult Inpatient Plan of Care  Goal: Plan of Care Review  Description: The Plan of Care Review/Shift note should be completed every shift.  The Outcome Evaluation is a brief statement about your assessment that the patient is improving, declining, or no change.  This information will be displayed automatically on your shift  note.  Outcome: Progressing  Flowsheets (Taken 6/23/2024 9016)  Outcome Evaluation: 7/10 abdominal pain, denies nausea, stool sample needed  Plan of Care Reviewed With:   patient   spouse  Overall Patient Progress: improving  Goal: Patient-Specific Goal (Individualized)  Description: You can add care plan individualizations to a care plan. Examples of Individualization might be:  \"Parent requests to be called daily at 9am for status\", \"I have a hard time hearing out of my right ear\", or \"Do not touch me to wake " "me up as it startles  me\".  Outcome: Progressing  Goal: Absence of Hospital-Acquired Illness or Injury  Outcome: Progressing  Intervention: Identify and Manage Fall Risk  Recent Flowsheet Documentation  Taken 6/23/2024 1340 by Aimee Rawls RN  Safety Promotion/Fall Prevention:   activity supervised   patient and family education   safety round/check completed  Intervention: Prevent Skin Injury  Recent Flowsheet Documentation  Taken 6/23/2024 1340 by Aimee Rawls RN  Body Position: position changed independently  Intervention: Prevent Infection  Recent Flowsheet Documentation  Taken 6/23/2024 1340 by Aimee Rawls RN  Infection Prevention:   single patient room provided   rest/sleep promoted  Goal: Optimal Comfort and Wellbeing  Outcome: Progressing  Goal: Readiness for Transition of Care  Outcome: Progressing     "

## 2024-06-23 NOTE — ED PROVIDER NOTES
"  Emergency Department Note      History of Present Illness     Chief Complaint  Abdominal Pain and Nausea, Vomiting, & Diarrhea    HPI  Yoselin Zelaya is a 73 year old female who presents to the ED for evaluation of abdominal pain, nausea, vomiting, and diarrhea beginning last night. Patient and her  endorsed that the onset of pain, nausea, vomiting, and diarrhea began at 3 am and has stayed constant prompting their visit this morning. Patient denies blood in vomit or stool and fevers. Denies history of diabetes or hypertension. She further endorses a blepharoplasty surgery was performed last week, and that she underwent gastric bypass surgery about 20 years ago.  adds that their son-in-law also reported abdominal pain and nausea following a meal at a restaurant which they believe to be food poisoning.     Independent Historian  Patient and  as detailed above.    Review of External Notes  {:841718:}  Past Medical History   Medical History and Problem List  No past medical history on file.    Medications  No current outpatient medications on file.    Surgical History   Soren-en-y  Gastric bypass    Physical Exam   Patient Vitals for the past 24 hrs:   BP Temp Temp src Pulse Resp SpO2 Height Weight   06/23/24 0950 -- -- -- -- -- 96 % -- --   06/23/24 0949 -- -- -- -- -- 97 % -- --   06/23/24 0948 132/61 -- -- 74 -- -- -- --   06/23/24 0945 (!) 127/107 -- -- 71 -- -- -- --   06/23/24 0930 120/55 -- -- 65 -- 94 % -- --   06/23/24 0927 130/59 -- -- 62 -- -- -- --   06/23/24 0921 -- -- -- -- -- (!) 86 % -- --   06/23/24 0908 126/70 -- -- 59 -- 92 % -- --   06/23/24 0831 131/79 -- -- 74 16 -- -- --   06/23/24 0828 -- -- -- -- -- -- 1.6 m (5' 3\") 80.7 kg (178 lb)   06/23/24 0827 117/60 97  F (36.1  C) Temporal 57 20 99 % -- --     Physical Exam  ***  Diagnostics   Lab Results   Labs Ordered and Resulted from Time of ED Arrival to Time of ED Departure   COMPREHENSIVE METABOLIC PANEL - Abnormal      " " Result Value    Sodium 137      Potassium 4.5      Carbon Dioxide (CO2) 22      Anion Gap 14      Urea Nitrogen 12.7      Creatinine 0.89      GFR Estimate 68      Calcium 9.2      Chloride 101      Glucose 218 (*)     Alkaline Phosphatase 63      AST 21      ALT 13      Protein Total 6.7      Albumin 4.1      Bilirubin Total 0.2     CBC WITH PLATELETS AND DIFFERENTIAL - Abnormal    WBC Count 11.4 (*)     RBC Count 3.80      Hemoglobin 11.4 (*)     Hematocrit 35.5      MCV 93      MCH 30.0      MCHC 32.1      RDW 13.2      Platelet Count 357      % Neutrophils 80      % Lymphocytes 15      % Monocytes 2      % Eosinophils 2      % Basophils 0      % Immature Granulocytes 0      NRBCs per 100 WBC 0      Absolute Neutrophils 9.1 (*)     Absolute Lymphocytes 1.7      Absolute Monocytes 0.3      Absolute Eosinophils 0.2      Absolute Basophils 0.0      Absolute Immature Granulocytes 0.0      Absolute NRBCs 0.0     LIPASE       Imaging  Abd/pelvis CT,  IV  contrast only TRAUMA / AAA    (Results Pending)       EKG   ECG taken at ***, ECG read at ***  ***   *** as compared to prior, dated ***/***/***.  Rate *** bpm. CA interval *** ms. QRS duration *** ms. QT/QTc ***/*** ms. P-R-T axes *** *** ***.    Independent Interpretation  {IndependentReview:432520::\"None\"}  ED Course    Medications Administered  Medications   ondansetron (ZOFRAN) injection 4 mg (4 mg Intravenous $Given 6/23/24 0833)   sodium chloride 0.9% BOLUS 1,000 mL (1,000 mLs Intravenous $New Bag 6/23/24 0834)   ondansetron (ZOFRAN) injection 4 mg (4 mg Intravenous $Given 6/23/24 0925)   morphine (PF) injection 4 mg (4 mg Intravenous $Given 6/23/24 0925)   iopamidol (ISOVUE-370) solution 90 mL (90 mLs Intravenous $Given 6/23/24 0939)   sodium chloride (PF) 0.9% PF flush 62 mL (62 mLs Intravenous $Given 6/23/24 0940)       Procedures  Procedures     Discussion of Management  {Consults/Care Discussions:802185::\"None\"}    Social Determinants of Health adding to " "complexity of care  {EPPA Social Determinants of Health:541354::\"None\"}    ED Course  ED Course as of 06/23/24 1356   Sun Jun 23, 2024   0904 I obtained history and examined the patient as noted above.     1006 Abd/pelvis CT,  IV  contrast only TRAUMA / AAA   1019 I rechecked and updated the patient.     1048 I rechecked and updated the patient. She is still having pain.   1053 I staffed the patient with Dr. Golden.   1053 I rechecked and updated the patient once again. We spoke about admission.   1123 I obtained history and examined the patient as noted above     1218 I spoke with Sybil Sierra PA-C regarding admitting the patient. She accepted on behalf of Dr. Garcia.     Medical Decision Making / Diagnosis   CMS Diagnoses: {Sepsis/Septic Shock/Stemi/Stroke:810386::\"None\"}    MIPS     {EPPA MIPS:091857::\"None\"}    CLINT Zelaya is a 73 year old female ***    Disposition  {EPPAFV Dispo:853707}    ICD-10 Codes:  No diagnosis found.     Discharge Medications  New Prescriptions    No medications on file     Scribe Disclosure:  I, Ct Britt, am serving as a scribe  for Erica Cota at 9:22 AM on 6/23/2024 to document services personally performed by Elly Blanchard PA-C based on my observations and the provider's statements to me.    Scribe Disclosure:  I, Erica Cota, am serving as a scribe at 9:03 AM on 6/23/2024 to document services personally performed by Elly Blanchard PA-C based on my observations and the provider's statements to me.     "

## 2024-06-23 NOTE — CARE PLAN
ROOM # 227    Living Situation (if not independent, order SW consult):  Facility name:  : Spouse    Activity level at baseline: IND  Activity level on admit: 1A    Who will be transporting you at discharge: Spouse    Patient registered to observation; given Patient Bill of Rights; given the opportunity to ask questions about observation status and their plan of care.  Patient has been oriented to the observation room, bathroom and call light is in place.    Discussed discharge goals and expectations with patient/family.

## 2024-06-23 NOTE — PLAN OF CARE
"  Problem: Adult Inpatient Plan of Care  Goal: Plan of Care Review  Description: The Plan of Care Review/Shift note should be completed every shift.  The Outcome Evaluation is a brief statement about your assessment that the patient is improving, declining, or no change.  This information will be displayed automatically on your shift  note.  Outcome: Progressing  Flowsheets (Taken 6/23/2024 0416)  Outcome Evaluation: pt denies pain or nausea at this time cont with plan of care.  Plan of Care Reviewed With: patient  Goal: Patient-Specific Goal (Individualized)  Description: You can add care plan individualizations to a care plan. Examples of Individualization might be:  \"Parent requests to be called daily at 9am for status\", \"I have a hard time hearing out of my right ear\", or \"Do not touch me to wake me up as it startles  me\".  Outcome: Progressing  Goal: Absence of Hospital-Acquired Illness or Injury  Outcome: Progressing  Goal: Optimal Comfort and Wellbeing  Outcome: Progressing  Goal: Readiness for Transition of Care  Outcome: Progressing   Goal Outcome Evaluation:      Plan of Care Reviewed With: patient          Outcome Evaluation: pt denies pain or nausea at this time cont with plan of care.      "

## 2024-06-23 NOTE — ED NOTES
[facility]  ED Nurse Handoff Report    ED Chief complaint: Abdominal Pain and Nausea, Vomiting, & Diarrhea  . ED Diagnosis:   Final diagnoses:   Enterocolitis   Pulmonary nodules   Hyperglycemia       Allergies:   Allergies   Allergen Reactions    Latex        Code Status: Full Code    Activity level - Baseline/Home:  independent.  Activity Level - Current:   assist of 1.   Lift room needed: No.   Bariatric: No   Needed: No   Isolation: No.   Infection: Not Applicable.     Respiratory status: Nasal cannula    Vital Signs (within 30 minutes):   Vitals:    06/23/24 1117 06/23/24 1118 06/23/24 1132 06/23/24 1145   BP: (!) 155/77 (!) 155/77 (!) 154/77 (!) 142/70   Pulse: 66 66 69 68   Resp:       Temp:       TempSrc:       SpO2: 93% 95% 93% 95%   Weight:       Height:           Cardiac Rhythm:  ,      Pain level:    Patient confused: No.   Patient Falls Risk: patient and family education.   Elimination Status: Has voided     Patient Report - Initial Complaint: N/V/D started at 0300.   Focused Assessment: Yoselin Zelaya is a 73 year old female who presents to the ED for evaluation of abdominal pain, nausea, vomiting, and diarrhea beginning last night. Patient and her  endorsed that the onset of pain, nausea, vomiting, and diarrhea began at 3 am and has stayed constant prompting their visit this morning. Patient denies blood in vomit or stool and fevers. Denies history of diabetes or hypertension. She further endorses a blepharoplasty surgery was performed last week, and that she underwent gastric bypass surgery about 20 years ago.  adds that their son-in-law also reported abdominal pain and nausea following a meal at a restaurant which they believe to be food poisoning.        Abnormal Results:   Labs Ordered and Resulted from Time of ED Arrival to Time of ED Departure   COMPREHENSIVE METABOLIC PANEL - Abnormal       Result Value    Sodium 137      Potassium 4.5       Carbon Dioxide (CO2) 22      Anion Gap 14      Urea Nitrogen 12.7      Creatinine 0.89      GFR Estimate 68      Calcium 9.2      Chloride 101      Glucose 218 (*)     Alkaline Phosphatase 63      AST 21      ALT 13      Protein Total 6.7      Albumin 4.1      Bilirubin Total 0.2     CBC WITH PLATELETS AND DIFFERENTIAL - Abnormal    WBC Count 11.4 (*)     RBC Count 3.80      Hemoglobin 11.4 (*)     Hematocrit 35.5      MCV 93      MCH 30.0      MCHC 32.1      RDW 13.2      Platelet Count 357      % Neutrophils 80      % Lymphocytes 15      % Monocytes 2      % Eosinophils 2      % Basophils 0      % Immature Granulocytes 0      NRBCs per 100 WBC 0      Absolute Neutrophils 9.1 (*)     Absolute Lymphocytes 1.7      Absolute Monocytes 0.3      Absolute Eosinophils 0.2      Absolute Basophils 0.0      Absolute Immature Granulocytes 0.0      Absolute NRBCs 0.0     LIPASE - Abnormal    Lipase 65 (*)    LACTIC ACID WHOLE BLOOD - Normal    Lactic Acid 1.0          Abd/pelvis CT,  IV  contrast only TRAUMA / AAA   Final Result   IMPRESSION:    1.  Findings of acute enterocolitis with a few thick-walled edematous and mildly dilated loops of small bowel in the pelvis, and a few segments of colon with wall thickening. Mesenteric edema and small amount of free fluid in the pelvis and right upper    quadrant. Findings may be due to infectious or inflammatory enterocolitis.   2.  Few small pulmonary nodules measuring up to 3 mm in the right middle lobe. Consider a follow-up chest CT in 12 months.          Treatments provided: pain management  Family Comments: at bedside  OBS brochure/video discussed/provided to patient:  N/A  ED Medications:   Medications   ondansetron (ZOFRAN) injection 4 mg (4 mg Intravenous $Given 6/23/24 4587)   sodium chloride 0.9% BOLUS 1,000 mL (0 mLs Intravenous Stopped 6/23/24 1053)   ondansetron (ZOFRAN) injection 4 mg (4 mg Intravenous $Given 6/23/24 5315)   morphine (PF) injection 4 mg (4 mg  Intravenous $Given 6/23/24 0975)   iopamidol (ISOVUE-370) solution 90 mL (90 mLs Intravenous $Given 6/23/24 0939)   sodium chloride (PF) 0.9% PF flush 62 mL (62 mLs Intravenous $Given 6/23/24 0940)   metoclopramide (REGLAN) injection 10 mg (10 mg Intravenous $Given 6/23/24 1122)       Drips infusing:  No  For the majority of the shift this patient was Green.   Interventions performed were pain meds nausea meds .    Sepsis treatment initiated: No    Cares/treatment/interventions/medications to be completed following ED care: see orders    ED Nurse Name: Mey Julian RN  11:47 AM    RECEIVING UNIT ED HANDOFF REVIEW    Above ED Nurse Handoff Report was reviewed: Yes  Reviewed by: Aimee Rawls RN on June 23, 2024 at 1:19 PM   GLORIA Cabrera called the ED to inform them the note was read: No

## 2024-06-23 NOTE — ED PROVIDER NOTES
ED ATTENDING PHYSICIAN NOTE:   I evaluated this patient in conjunction with Santo HARRIS  I have participated in the care of the patient and personally performed key elements of the history, exam, and medical decision making.      HPI:   Yoselin Zelaya is a 73 year old female presenting with abdominal pain nausea, vomiting, diarrhea since last night, all nonbloody.  Other family member had similar symptoms so they thought it was just a GI bug but patient is having significant abdominal pain which prompted the visit.     EXAM:   GENERAL: Patient does appear uncomfortable.    HEAD: Multiple healing surgical wounds on the face from recent surgery.  NECK: No rigidity  PULM: Breathing comfortably  ABD: Soft, generalized tenderness palpation of abdomen, nondistended, no guarding  DERM: No rash. Skin warm and dry  EXTREMITY: Moving all extremities without difficulty.         MEDICAL DECISION MAKING/ASSESSMENT AND PLAN:   Patient with vomiting and diarrhea, likely secondary to enterocolitis found on CT scan.  Labs with minimal leukocytosis and minimal bump in lipase.  Lactate within normal limits.  CT scan otherwise reassuring.  She has incidental pulm nodules which are listed as diagnosis.  Patient was given IV fluids, Zofran and morphine but pain persisted so hospitalist was discussed with for admission.     DIAGNOSIS:     ICD-10-CM    1. Enterocolitis  K52.9       2. Pulmonary nodules  R91.8       3. Hyperglycemia  R73.9       4. Leukocytosis  D72.829            DISPOSITION:   HOME     Phan Golden MD  6/23/2024  [facility] OBSERVATION DEPT     Phan Golden MD  06/23/24 1507

## 2024-06-23 NOTE — PHARMACY-ADMISSION MEDICATION HISTORY
Pharmacy Intern Admission Medication History    Admission medication history is complete. The information provided in this note is only as accurate as the sources available at the time of the update.    Information Source(s): Patient via in-person    Pertinent Information: NA    Changes made to PTA medication list:  Added: ALL  Deleted: None  Changed: None    Allergies reviewed with patient and updates made in EHR: yes    Medication History Completed By: Radha Busby 6/23/2024 1:22 PM    PTA Med List   Medication Sig Last Dose    acetaminophen (TYLENOL) 500 MG tablet Take 1,000 mg by mouth every 6 hours 6/23/2024 at am    Ascorbic Acid 250 MG CHEW Take 500 mg by mouth every morning Past Month    calcium-vitamin D-vitamin K (CALCIUM SOFT CHEWS) 500-500-40 MG-UNT-MCG CHEW Take 2 tablets by mouth every morning Past Month    gabapentin (NEURONTIN) 300 MG capsule Take 900 mg by mouth 3 times daily 6/22/2024 at pm    hydrOXYzine HCl (ATARAX) 50 MG tablet Take 50 mg by mouth every 6 hours as needed 6/22/2024 at pm    propranolol (INDERAL) 20 MG tablet Take 20 mg by mouth 3 times daily as needed (anxiety) 6/22/2024 at pm    sertraline (ZOLOFT) 100 MG tablet Take 100 mg by mouth every morning 6/22/2024 at am

## 2024-06-23 NOTE — H&P
[facility]  Admission History and Physical Examination    NAME: Yoselin Zelaya   : 1950   MRN: [id number]     Date of Admission:  2024    Assessment & Plan   Yoselin Zelaya is a 73 year old female with a PMH significant for remote history of gastric bypass, TIA, iron deficiency anemia, history of subarachnoid hemorrhage due to fall who presents to the emergency room due to complaints of abdominal pain with nausea vomiting diarrhea that started around 3:00 this morning.    Patient is currently visiting the [place] for her granddaughters soccer tournament.  They have been staying in a hotel and eating out at a restaurant.  Interestingly patient's son-in-law was also sick with the same symptoms 24 hours prior with abdominal pain nausea vomiting diarrhea but his sxs has improved.  She had the same meal as him and thinks that this might be related to food poisoning or this could just be gastroenteritis.    Upon arrival to emergency room she is afebrile vital signs are stable.  Her abdominal pain did seem somewhat out of proportion.  Laboratory results was fairly unremarkable with normal lactic acid, unremarkable BMP and minimal leukocytosis at 11.4.  CT abdomen pelvis shows acute enterocolitis with a few thick-walled edematous and mildly dilated loops of small bowel in the pelvis and few segments of colon with wall thickening and evidence of mesenteric edema with free fluid in the pelvis and right upper quadrant.  Incidental finding of small pulmonary nodule up to 3 mm right middle lobe will need further outpt monitoring. She was given fluids IV pain meds and recommended admission for supportive cares overnight.    #Acute enterocolitis - possible food borne illness vs viral etiology   Acute episode of abdominal pain nausea vomiting diarrhea about 24-hour after her son-in-law with similar symptoms after eating out at a restaurant.  Possible food poisoning versus viral  "gastroenteritis.  Labs are reassuring, normal lactic acid  -Continue supportive care with fluid hydration, antiemetics, pain meds as needed  -Send off stool sample for enteric panel  -Low suspicion for C. difficile, no recent antibiotic  -Advance diet as tolerated    # Recent blepharoplasty for droopy eyelids  -Just had surgery this past Wednesday  -States did not require pre and postop antibiotic  -Appears well-healed  -Follow-up with plastic surgery as outpatient    # Depression/anxiety  -Resume Zoloft  -Patient takes as needed propranolol for anxiety apparently    # Neuropathy  -Continue PTA gabapentin    #Incidental finding:    small pulmonary nodule up to 3 mm right middle lobe will need further outpt monitoring in 12 mos     # Obesity: Estimated body mass index is 31.53 kg/m  as calculated from the following:    Height as of this encounter: 1.6 m (5' 3\").    Weight as of this encounter: 80.7 kg (178 lb).            Awaiting formal pharmacy reconciliation to resume home medications.     DVT Prophylaxis: Low Risk/Ambulatory with no VTE prophylaxis indicated  Code Status: Full Code  Medically Ready for Discharge: Anticipated Tomorrow      Family updated with plan of care:  at bedside        Expected Discharge Date: 06/24/2024               Sybil Dolan PA-C    Primary Care Physician   Provider Not In System from [facility]     Chief Complaint   Abd pain n/v     History is obtained from the patient    Discussed with CARMELO Blanchard  in the ED, full chart review including lab work, imaging, and vital signs were reviewed.     History of Present Illness Yoselin Zelaya is a 73 year old female with a PMH significant for remote history of gastric bypass, TIA, iron deficiency anemia, history of subarachnoid hemorrhage due to fall who presents to the emergency room due to complaints of abdominal pain with nausea vomiting diarrhea that started around 3:00 this morning.    Patient is currently visiting the " [facility] for her granddaughters soccer tournament.  They have been staying in a hotel and eating out at a restaurant.  Interestingly patient's son-in-law was also sick with the same symptoms 24 hours prior with abdominal pain nausea vomiting diarrhea but his sxs has improved.  She had the same meal as him and thinks that this might be related to food poisoning or this could just be gastroenteritis.    Upon arrival to emergency room she is afebrile vital signs are stable.  Her abdominal pain did seem somewhat out of proportion.  Laboratory results was fairly unremarkable with normal lactic acid, unremarkable BMP and minimal leukocytosis at 11.4.  CT abdomen pelvis shows acute enterocolitis with a few thick-walled edematous and mildly dilated loops of small bowel in the pelvis and few segments of colon with wall thickening and evidence of mesenteric edema with free fluid in the pelvis and right upper quadrant.  Incidental finding of small pulmonary nodule up to 3 mm right middle lobe will need further outpt monitoring. She was given fluids IV pain meds and recommended admission for supportive cares overnight.    Past Medical History    I have reviewed this patient's medical history and updated it with pertinent information if needed.     Past Surgical History   I have reviewed this patient's surgical history and updated it with pertinent information if needed.    Prior to Admission Medications   Prior to Admission Medications   Prescriptions Last Dose Informant Patient Reported? Taking?   Ascorbic Acid 250 MG CHEW Past Month  Yes Yes   Sig: Take 500 mg by mouth every morning   acetaminophen (TYLENOL) 500 MG tablet 6/23/2024 at am  Yes Yes   Sig: Take 1,000 mg by mouth every 6 hours   calcium-vitamin D-vitamin K (CALCIUM SOFT CHEWS) 500-500-40 MG-UNT-MCG CHEW Past Month  Yes Yes   Sig: Take 2 tablets by mouth every morning   gabapentin (NEURONTIN) 300 MG capsule 6/22/2024 at pm  Yes Yes   Sig: Take 900 mg by mouth 3  times daily   hydrOXYzine HCl (ATARAX) 50 MG tablet 6/22/2024 at pm  Yes Yes   Sig: Take 50 mg by mouth every 6 hours as needed   propranolol (INDERAL) 20 MG tablet 6/22/2024 at pm  Yes Yes   Sig: Take 20 mg by mouth 3 times daily as needed (anxiety)   sertraline (ZOLOFT) 100 MG tablet 6/22/2024 at am  Yes Yes   Sig: Take 100 mg by mouth every morning      Facility-Administered Medications: None     Allergies   Allergies   Allergen Reactions    Latex        Social History   I have reviewed this patient's social history and updated it with pertinent information if needed. Yoselin Zelaya      Family History   I have reviewed this patient's family history and updated it with pertinent information if needed.   No family history on file.    Review of Systems   The 10 point Review of Systems is negative other than noted in the HPI or here.     Physical Exam   Temp: 98.3  F (36.8  C) Temp src: Oral BP: 126/61 Pulse: 72   Resp: 16 SpO2: 96 % O2 Device: Nasal cannula Oxygen Delivery: 2 LPM  Vital Signs with Ranges  Temp:  [97  F (36.1  C)-98.3  F (36.8  C)] 98.3  F (36.8  C)  Pulse:  [57-74] 72  Resp:  [16-20] 16  BP: (117-155)/() 126/61  SpO2:  [86 %-100 %] 96 %  178 lbs 0 oz    Constitutional: Awake, alert,  no apparent distress.  Eyes: Conjunctiva and pupils examined and normal. Eyelids mildly swollen with steri strip intact   HEENT: Moist mucous membranes, normal dentition.  Respiratory: Clear to auscultation bilaterally, no crackles or wheezing.  Cardiovascular: Regular rate and rhythm, normal S1 and S2, and no murmur noted.  GI: Soft, non-distended, mild generalized tenderness but no rebound tenderness or guarding. + bs   Lymph/Hematologic: No anterior cervical or supraclavicular adenopathy.  Skin: No rashes, no cyanosis, no edema.  Musculoskeletal: No deformities noted.  No erythema or tenderness. Moving all extremities.  Neurologic: No focal deficits noted. Speech is clear. Coordination and strength grossly  normal.   Psychiatric: Appropriate affect.    Data   Data reviewed today:    Imaging:   Recent Results (from the past 24 hour(s))   Abd/pelvis CT,  IV  contrast only TRAUMA / AAA    Narrative    EXAM: CT ABDOMEN PELVIS W CONTRAST  LOCATION: [facility]  DATE: 6/23/2024    INDICATION: Gen abd pain, hx of soren en y, NVD  COMPARISON: None.  TECHNIQUE: CT scan of the abdomen and pelvis was performed following injection of IV contrast. Multiplanar reformats were obtained. Dose reduction techniques were used.  CONTRAST: 90mL Isovue 370    FINDINGS:   LOWER CHEST: A few small nodules in the right middle lobe measuring 2-3 mm (series 3, image 9-20). Bilateral breast prosthesis.    HEPATOBILIARY: No focal lesions in the liver. Cholecystectomy. Mild intrahepatic and extra hepatic biliary ductal dilatation, likely related to postcholecystectomy reservoir effect.    PANCREAS: Normal.    SPLEEN: Normal.    ADRENAL GLANDS: Normal.    KIDNEYS/BLADDER: Normal.    BOWEL: There is a cluster of the cord edematous loops of small bowel and a few mildly dilated loops of small bowel in the mid pelvis with bowel loops maximally dilated up to 3.4 cm. Surrounding mesenteric edema and fluid in the surrounding mesentery.   Normal caliber loops of small bowel in the upper abdomen and normal caliber of the distal and terminal ileum. No discrete transition point. Normal caliber of the colon. Mild circumferential colonic wall thickening and trace fluid around the colon,   predominantly involving the descending colon and sigmoid colon. Findings may be related to nonspecific enterocolitis. Soren-en-Y gastric bypass.    LYMPH NODES: No lymphadenopathy.    VASCULATURE: No abdominal aortic aneurysm.    PELVIC ORGANS: Hysterectomy. Small amount of free fluid in the pelvis.  Small amount of free fluid around the right hepatic lobe.    MUSCULOSKELETAL: Bilateral breast prosthesis. Degenerative changes in the spine. Instrumented fusion  in the lumbar spine. No suspicious lesions in the bones.      Impression    IMPRESSION:   1.  Findings of acute enterocolitis with a few thick-walled edematous and mildly dilated loops of small bowel in the pelvis, and a few segments of colon with wall thickening. Mesenteric edema and small amount of free fluid in the pelvis and right upper   quadrant. Findings may be due to infectious or inflammatory enterocolitis.  2.  Few small pulmonary nodules measuring up to 3 mm in the right middle lobe. Consider a follow-up chest CT in 12 months.       Recent Labs   Lab 06/23/24  0834   WBC 11.4*   HGB 11.4*   MCV 93         POTASSIUM 4.5   CHLORIDE 101   CO2 22   BUN 12.7   CR 0.89   ANIONGAP 14   ERICK 9.2   *   ALBUMIN 4.1   PROTTOTAL 6.7   BILITOTAL 0.2   ALKPHOS 63   ALT 13   AST 21   LIPASE 65*           Sybil Dolan PA-C  [facility] Medicine  June 23, 2024  Securely message with the Vocera Web Console (learn more here)  Text page via [employer] Paging/Directory

## 2024-06-24 LAB
ANION GAP SERPL CALCULATED.3IONS-SCNC: 13 MMOL/L (ref 7–15)
BUN SERPL-MCNC: 7.7 MG/DL (ref 8–23)
CALCIUM SERPL-MCNC: 8.6 MG/DL (ref 8.8–10.2)
CHLORIDE SERPL-SCNC: 103 MMOL/L (ref 98–107)
CREAT SERPL-MCNC: 0.8 MG/DL (ref 0.51–0.95)
DEPRECATED HCO3 PLAS-SCNC: 19 MMOL/L (ref 22–29)
EGFRCR SERPLBLD CKD-EPI 2021: 77 ML/MIN/1.73M2
ERYTHROCYTE [DISTWIDTH] IN BLOOD BY AUTOMATED COUNT: 13.4 % (ref 10–15)
GLUCOSE SERPL-MCNC: 158 MG/DL (ref 70–99)
HBA1C MFR BLD: 5.5 %
HCT VFR BLD AUTO: 35.8 % (ref 35–47)
HGB BLD-MCNC: 11.5 G/DL (ref 11.7–15.7)
MCH RBC QN AUTO: 29.7 PG (ref 26.5–33)
MCHC RBC AUTO-ENTMCNC: 32.1 G/DL (ref 31.5–36.5)
MCV RBC AUTO: 93 FL (ref 78–100)
PLATELET # BLD AUTO: 329 10E3/UL (ref 150–450)
POTASSIUM SERPL-SCNC: 4.2 MMOL/L (ref 3.4–5.3)
RBC # BLD AUTO: 3.87 10E6/UL (ref 3.8–5.2)
SODIUM SERPL-SCNC: 135 MMOL/L (ref 135–145)
WBC # BLD AUTO: 13.7 10E3/UL (ref 4–11)

## 2024-06-24 PROCEDURE — 250N000011 HC RX IP 250 OP 636: Mod: JZ | Performed by: PHYSICIAN ASSISTANT

## 2024-06-24 PROCEDURE — 36415 COLL VENOUS BLD VENIPUNCTURE: CPT | Performed by: PHYSICIAN ASSISTANT

## 2024-06-24 PROCEDURE — 250N000013 HC RX MED GY IP 250 OP 250 PS 637: Performed by: PHYSICIAN ASSISTANT

## 2024-06-24 PROCEDURE — 96376 TX/PRO/DX INJ SAME DRUG ADON: CPT

## 2024-06-24 PROCEDURE — 99233 SBSQ HOSP IP/OBS HIGH 50: CPT | Performed by: PHYSICIAN ASSISTANT

## 2024-06-24 PROCEDURE — 258N000003 HC RX IP 258 OP 636: Mod: JZ | Performed by: PHYSICIAN ASSISTANT

## 2024-06-24 PROCEDURE — 83036 HEMOGLOBIN GLYCOSYLATED A1C: CPT | Performed by: PHYSICIAN ASSISTANT

## 2024-06-24 PROCEDURE — 96375 TX/PRO/DX INJ NEW DRUG ADDON: CPT

## 2024-06-24 PROCEDURE — 80048 BASIC METABOLIC PNL TOTAL CA: CPT | Performed by: PHYSICIAN ASSISTANT

## 2024-06-24 PROCEDURE — 85027 COMPLETE CBC AUTOMATED: CPT | Performed by: PHYSICIAN ASSISTANT

## 2024-06-24 PROCEDURE — 99418 PROLNG IP/OBS E/M EA 15 MIN: CPT | Performed by: PHYSICIAN ASSISTANT

## 2024-06-24 PROCEDURE — G0378 HOSPITAL OBSERVATION PER HR: HCPCS

## 2024-06-24 PROCEDURE — 96361 HYDRATE IV INFUSION ADD-ON: CPT

## 2024-06-24 RX ORDER — MAGNESIUM HYDROXIDE/ALUMINUM HYDROXICE/SIMETHICONE 120; 1200; 1200 MG/30ML; MG/30ML; MG/30ML
30 SUSPENSION ORAL EVERY 4 HOURS PRN
Status: DISCONTINUED | OUTPATIENT
Start: 2024-06-24 | End: 2024-07-06 | Stop reason: HOSPADM

## 2024-06-24 RX ORDER — DICYCLOMINE HYDROCHLORIDE 10 MG/1
10 CAPSULE ORAL 4 TIMES DAILY
Status: DISCONTINUED | OUTPATIENT
Start: 2024-06-24 | End: 2024-07-06 | Stop reason: HOSPADM

## 2024-06-24 RX ORDER — SODIUM CHLORIDE 9 MG/ML
INJECTION, SOLUTION INTRAVENOUS CONTINUOUS
Status: CANCELLED | OUTPATIENT
Start: 2024-06-24 | End: 2024-06-25

## 2024-06-24 RX ADMIN — ONDANSETRON 4 MG: 2 INJECTION INTRAMUSCULAR; INTRAVENOUS at 20:33

## 2024-06-24 RX ADMIN — ALUMINUM HYDROXIDE, MAGNESIUM HYDROXIDE, AND SIMETHICONE 30 ML: 1200; 120; 1200 SUSPENSION ORAL at 12:58

## 2024-06-24 RX ADMIN — HYDROMORPHONE HYDROCHLORIDE 2 MG: 2 TABLET ORAL at 01:44

## 2024-06-24 RX ADMIN — SERTRALINE 100 MG: 100 TABLET, FILM COATED ORAL at 08:13

## 2024-06-24 RX ADMIN — DICYCLOMINE HYDROCHLORIDE 10 MG: 10 CAPSULE ORAL at 20:35

## 2024-06-24 RX ADMIN — ACETAMINOPHEN 1000 MG: 500 TABLET, FILM COATED ORAL at 08:12

## 2024-06-24 RX ADMIN — ONDANSETRON 4 MG: 2 INJECTION INTRAMUSCULAR; INTRAVENOUS at 10:43

## 2024-06-24 RX ADMIN — HYDROMORPHONE HYDROCHLORIDE 2 MG: 2 TABLET ORAL at 10:51

## 2024-06-24 RX ADMIN — Medication 1 CAPSULE: at 20:34

## 2024-06-24 RX ADMIN — PROCHLORPERAZINE EDISYLATE 5 MG: 5 INJECTION INTRAMUSCULAR; INTRAVENOUS at 02:47

## 2024-06-24 RX ADMIN — DICYCLOMINE HYDROCHLORIDE 10 MG: 10 CAPSULE ORAL at 12:58

## 2024-06-24 RX ADMIN — SODIUM CHLORIDE 1000 ML: 9 INJECTION, SOLUTION INTRAVENOUS at 15:32

## 2024-06-24 RX ADMIN — GABAPENTIN 900 MG: 300 CAPSULE ORAL at 20:33

## 2024-06-24 RX ADMIN — ACETAMINOPHEN 1000 MG: 500 TABLET, FILM COATED ORAL at 01:44

## 2024-06-24 RX ADMIN — GABAPENTIN 900 MG: 300 CAPSULE ORAL at 08:12

## 2024-06-24 RX ADMIN — PROCHLORPERAZINE EDISYLATE 5 MG: 5 INJECTION INTRAMUSCULAR; INTRAVENOUS at 13:13

## 2024-06-24 RX ADMIN — Medication 1 CAPSULE: at 08:13

## 2024-06-24 RX ADMIN — HYDROMORPHONE HYDROCHLORIDE 0.4 MG: 0.2 INJECTION, SOLUTION INTRAMUSCULAR; INTRAVENOUS; SUBCUTANEOUS at 02:32

## 2024-06-24 RX ADMIN — ACETAMINOPHEN 1000 MG: 500 TABLET, FILM COATED ORAL at 20:34

## 2024-06-24 RX ADMIN — HYDROMORPHONE HYDROCHLORIDE 2 MG: 2 TABLET ORAL at 20:34

## 2024-06-24 ASSESSMENT — ACTIVITIES OF DAILY LIVING (ADL)
ADLS_ACUITY_SCORE: 34
ADLS_ACUITY_SCORE: 37
ADLS_ACUITY_SCORE: 34
ADLS_ACUITY_SCORE: 37
ADLS_ACUITY_SCORE: 34
ADLS_ACUITY_SCORE: 34
ADLS_ACUITY_SCORE: 37
ADLS_ACUITY_SCORE: 35
ADLS_ACUITY_SCORE: 34
ADLS_ACUITY_SCORE: 37
ADLS_ACUITY_SCORE: 41
ADLS_ACUITY_SCORE: 38
ADLS_ACUITY_SCORE: 34
ADLS_ACUITY_SCORE: 41
ADLS_ACUITY_SCORE: 36
ADLS_ACUITY_SCORE: 37
ADLS_ACUITY_SCORE: 34
ADLS_ACUITY_SCORE: 37
ADLS_ACUITY_SCORE: 34
ADLS_ACUITY_SCORE: 34

## 2024-06-24 NOTE — PLAN OF CARE
PRIMARY DIAGNOSIS: Abdominal Pain  OUTPATIENT/OBSERVATION GOALS TO BE MET BEFORE DISCHARGE:  ADLs back to baseline: No    Activity and level of assistance: Up with standby assistance.    Pain status: Improved-controlled with oral pain medications.    Return to near baseline physical activity: No     Discharge Planner Nurse   Safe discharge environment identified: No  Barriers to discharge: Yes       Entered by: Krystle Mckeon RN 06/24/2024 4:21 PM   Poor intake. Lethargic. Intermittent nausea. Hypoactive bowel sounds. SBA. AxOx4. 2L O2 via NC to maintain O2 saturation.    Please review provider order for any additional goals.   Nurse to notify provider when observation goals have been met and patient is ready for discharge.Goal Outcome Evaluation:      Plan of Care Reviewed With: patient, spouse, child    Overall Patient Progress: no changeOverall Patient Progress: no change    Outcome Evaluation: Resting. Rise and fall of chest observed. Poor oral intake.      Problem: Adult Inpatient Plan of Care  Goal: Plan of Care Review  Description: The Plan of Care Review/Shift note should be completed every shift.  The Outcome Evaluation is a brief statement about your assessment that the patient is improving, declining, or no change.  This information will be displayed automatically on your shift  note.  6/24/2024 1620 by Krystle Mckeon RN  Outcome: Progressing  Flowsheets (Taken 6/24/2024 1610)  Outcome Evaluation: Resting. Rise and fall of chest observed. Poor oral intake.  Plan of Care Reviewed With:   patient   spouse   child  Overall Patient Progress: no change  6/24/2024 1448 by Krystle Mckeon RN  Outcome: Progressing  Flowsheets (Taken 6/24/2024 1448)  Outcome Evaluation: Vomited. Urine sandra. Poor oral intake.  Plan of Care Reviewed With:   patient   child   spouse  Overall Patient Progress: no change  6/24/2024 0945 by Krystle Mckeon RN  Outcome: Progressing  Flowsheets (Taken 6/24/2024 0945)  Outcome  "Evaluation: Denies nausea  Plan of Care Reviewed With: patient  Overall Patient Progress: improving  Goal: Patient-Specific Goal (Individualized)  Description: You can add care plan individualizations to a care plan. Examples of Individualization might be:  \"Parent requests to be called daily at 9am for status\", \"I have a hard time hearing out of my right ear\", or \"Do not touch me to wake me up as it startles  me\".  6/24/2024 1620 by Krystle Mckeon RN  Outcome: Progressing  6/24/2024 1448 by Krystle Mckeon RN  Outcome: Progressing  6/24/2024 0945 by Krystle Mckeon RN  Outcome: Progressing  Goal: Absence of Hospital-Acquired Illness or Injury  6/24/2024 1620 by Krystle Mckeon RN  Outcome: Progressing  6/24/2024 1448 by Krystle Mckeon RN  Outcome: Progressing  6/24/2024 0945 by Krystle Mckeon RN  Outcome: Progressing  Intervention: Identify and Manage Fall Risk  Recent Flowsheet Documentation  Taken 6/24/2024 1339 by Krystle Mckeon RN  Safety Promotion/Fall Prevention:   treat underlying cause   treat reversible contributory factors   supervised activity   safety round/check completed   activity supervised   assistive device/personal items within reach   clutter free environment maintained   nonskid shoes/slippers when out of bed  Intervention: Prevent Skin Injury  Recent Flowsheet Documentation  Taken 6/24/2024 1051 by Krystle Mckeon RN  Body Position: position changed independently  Taken 6/24/2024 0812 by Krystle Mckeon RN  Body Position: position changed independently  Intervention: Prevent Infection  Recent Flowsheet Documentation  Taken 6/24/2024 1339 by Krystle Mckeon RN  Infection Prevention:   rest/sleep promoted   single patient room provided  Goal: Optimal Comfort and Wellbeing  6/24/2024 1620 by Krystle Mckeon RN  Outcome: Progressing  6/24/2024 1448 by Krystle Mckeon RN  Outcome: Progressing  6/24/2024 0945 by Krystle Mckeon RN  Outcome: Progressing  Intervention: " Monitor Pain and Promote Comfort  Recent Flowsheet Documentation  Taken 6/24/2024 1100 by Krystle Mckeon RN  Pain Management Interventions:   care clustered   diversional activity provided   emotional support   environmental changes   pillow support provided   repositioned  Taken 6/24/2024 1051 by Krystle Mckeon, RN  Pain Management Interventions: medication (see MAR)  Goal: Readiness for Transition of Care  6/24/2024 1620 by Krystle Mcekon RN  Outcome: Progressing  6/24/2024 1448 by Krystle Mckeon RN  Outcome: Progressing  6/24/2024 0945 by Krystle Mckeon, RN  Outcome: Progressing

## 2024-06-24 NOTE — PLAN OF CARE
PRIMARY DIAGNOSIS: Nausea/Vomiting/Abdominal Pain  OUTPATIENT/OBSERVATION GOALS TO BE MET BEFORE DISCHARGE:  ADLs back to baseline: No    Activity and level of assistance: Up with standby assistance.    Pain status: Improved-controlled with oral pain medications.    Return to near baseline physical activity: No     Discharge Planner Nurse   Safe discharge environment identified: Yes  Barriers to discharge: Yes       Entered by: Krystle Mckeon RN 06/24/2024 2:49 PM   Nausea intermittent. Emesis after taking Maalox and Bentyl. Abdominal discomfort. Has not eaten.  Poor fluid intake. Encouraged in increase intake. Requiring O2 via NC to maintain O2 sats>92%.   Please review provider order for any additional goals.   Nurse to notify provider when observation goals have been met and patient is ready for discharge.Goal Outcome Evaluation:      Plan of Care Reviewed With: patient, child, spouse    Overall Patient Progress: no changeOverall Patient Progress: no change    Outcome Evaluation: Vomited. Urine sandra. Poor oral intake.      Problem: Adult Inpatient Plan of Care  Goal: Plan of Care Review  Description: The Plan of Care Review/Shift note should be completed every shift.  The Outcome Evaluation is a brief statement about your assessment that the patient is improving, declining, or no change.  This information will be displayed automatically on your shift  note.  6/24/2024 1448 by Krystle Mckeon RN  Outcome: Progressing  Flowsheets (Taken 6/24/2024 1448)  Outcome Evaluation: Vomited. Urine sandra. Poor oral intake.  Plan of Care Reviewed With:   patient   child   spouse  Overall Patient Progress: no change  6/24/2024 0945 by Krystle Mckeon RN  Outcome: Progressing  Flowsheets (Taken 6/24/2024 0945)  Outcome Evaluation: Denies nausea  Plan of Care Reviewed With: patient  Overall Patient Progress: improving  Goal: Patient-Specific Goal (Individualized)  Description: You can add care plan individualizations to a  "care plan. Examples of Individualization might be:  \"Parent requests to be called daily at 9am for status\", \"I have a hard time hearing out of my right ear\", or \"Do not touch me to wake me up as it startles  me\".  6/24/2024 1448 by Krystle Mckeon RN  Outcome: Progressing  6/24/2024 0945 by Krystle Mckeon RN  Outcome: Progressing  Goal: Absence of Hospital-Acquired Illness or Injury  6/24/2024 1448 by Krystle Mckeon RN  Outcome: Progressing  6/24/2024 0945 by Krystle Mckeon RN  Outcome: Progressing  Intervention: Prevent Skin Injury  Recent Flowsheet Documentation  Taken 6/24/2024 1051 by Krystle Mckeon RN  Body Position: position changed independently  Taken 6/24/2024 0812 by Krystle Mckeon RN  Body Position: position changed independently  Goal: Optimal Comfort and Wellbeing  6/24/2024 1448 by Krystle Mckeon RN  Outcome: Progressing  6/24/2024 0945 by Krystle Mckeon RN  Outcome: Progressing  Intervention: Monitor Pain and Promote Comfort  Recent Flowsheet Documentation  Taken 6/24/2024 1100 by Krystle Mckeon RN  Pain Management Interventions:   care clustered   diversional activity provided   emotional support   environmental changes   pillow support provided   repositioned  Taken 6/24/2024 1051 by Krystle Mckeon RN  Pain Management Interventions: medication (see MAR)  Goal: Readiness for Transition of Care  6/24/2024 1448 by Krystle Mckeon RN  Outcome: Progressing  6/24/2024 0945 by Krystle Mckeon RN  Outcome: Progressing     "

## 2024-06-24 NOTE — PLAN OF CARE
PRIMARY DIAGNOSIS: ACUTE ENTEROCOLITIS   OUTPATIENT/OBSERVATION GOALS TO BE MET BEFORE DISCHARGE:  ADLs back to baseline: Yes    Activity and level of assistance: Up with standby assistance.    Pain status: Improved-controlled with oral pain medications.    Return to near baseline physical activity: Yes     Discharge Planner Nurse   Safe discharge environment identified: Yes  Barriers to discharge: Yes       Entered by: Homa Brown RN 06/24/2024    A&O x4. VSS, RA. Up w/ SBA, getting up to BR to void. NS infusing at 100 ml/hr. Continuing to have persistent nausea w/o emesis-- PRN compazine given for second time this shift--effective per pt report. Pain still persistent and increasing at this time-- given PO 2 mg Dilaudid and then IV dilaudid for breakthrough pain. Effective per pt report. Resting with eyes closed at this time. CLD. Still needs stool sample-- pt aware.   Please review provider order for any additional goals.   Nurse to notify provider when observation goals have been met and patient is ready for discharge.  Goal Outcome Evaluation:      Plan of Care Reviewed With: patient    Overall Patient Progress: improvingOverall Patient Progress: improving    Outcome Evaluation: Tolerating CLD. Abd pain controlled w/ PRNs and rest.    Problem: Adult Inpatient Plan of Care  Goal: Plan of Care Review  Description: The Plan of Care Review/Shift note should be completed every shift.  The Outcome Evaluation is a brief statement about your assessment that the patient is improving, declining, or no change.  This information will be displayed automatically on your shift  note.  Outcome: Progressing  Flowsheets (Taken 6/23/2024 2236)  Outcome Evaluation: Tolerating CLD. Abd pain controlled w/ PRNs and rest.  Plan of Care Reviewed With: patient  Overall Patient Progress: improving  Goal: Patient-Specific Goal (Individualized)  Description: You can add care plan individualizations to a care plan. Examples of  "Individualization might be:  \"Parent requests to be called daily at 9am for status\", \"I have a hard time hearing out of my right ear\", or \"Do not touch me to wake me up as it startles  me\".  Outcome: Progressing  Goal: Absence of Hospital-Acquired Illness or Injury  Outcome: Progressing  Intervention: Identify and Manage Fall Risk  Recent Flowsheet Documentation  Taken 6/23/2024 2040 by Homa Gutierrez, RN  Safety Promotion/Fall Prevention:   activity supervised   clutter free environment maintained   nonskid shoes/slippers when out of bed   patient and family education   safety round/check completed   treat reversible contributory factors   treat underlying cause  Intervention: Prevent Infection  Recent Flowsheet Documentation  Taken 6/23/2024 2040 by Homa Gutierrez, RN  Infection Prevention:   single patient room provided   rest/sleep promoted  Goal: Optimal Comfort and Wellbeing  Outcome: Progressing  Goal: Readiness for Transition of Care  Outcome: Progressing     "

## 2024-06-24 NOTE — PROGRESS NOTES
I was present with the student who participated in the service and in the documentation of the note. I have verified the history and personally performed the physical exam and medical decision-making. I agree with the assessment and plan of care as documented in the note. Elly Gutierrez PA-C      [facility]    Medicine Progress Note - Hospitalist Service    Date of Admission:  6/23/2024    Assessment & Plan   Yoselin Zelaya is a 73 year old female with a PMH significant for remote history of gastric bypass, TIA, iron deficiency anemia, and history of subarachnoid hemorrhage due to fall, who was admitted on 6/23/2024 with acute enterocolitis.     #Acute enterocolitis - possible food borne illness vs viral etiology   Acute episode of abdominal pain, nausea, vomiting and diarrhea about 24-hour after eating out at a restaurant. Her son-in-law had similar symptoms but quickly resolved.  Possible food poisoning versus viral gastroenteritis.  Labs are reassuring, normal lactic acid, lipase slightly elevated, likely due to vomiting. WBC elevated to 11.4 on admission, up to 13.7 today.   No further diarrhea since admission, remains nauseous and very weak with poor appetite.  - Continue supportive care with antiemetics and pain meds as needed  - resume IVFs as she has been unable to tolerate PO intake today  - add Bentyl and PRN Maalox  - Send off sample for enteric panel if any further diarrhea  - Low suspicion for C. difficile, no recent antibiotic  - remote hx of gastric bypass, occasional diarrhea is not unusual for her.   - Advance diet as tolerated     #Hyperglycemia  Glucose 218 in ED, 156 on fasting labs this AM  - HgbA1c added on, 5.5  - Suspect reactive to vomiting.     #Recent blepharoplasty for droopy eyelids  - Just had surgery this past Wednesday  - States did not require pre and postop antibiotic  - Appears well-healed  - Follow-up with plastic surgery as outpatient    "  #Depression/anxiety  - Continue Zoloft  - Patient takes as needed propranolol for anxiety, continue     #Neuropathy  - Continue PTA gabapentin     #Incidental finding:   - small pulmonary nodule up to 3 mm right middle lobe will need further outpt monitoring in 12 mos               Diet: Advance Diet as Tolerated: Full Liquid Diet; Regular Diet Adult    DVT Prophylaxis: Low Risk/Ambulatory with no VTE prophylaxis indicated  Otero Catheter: Not present  Lines: None     Cardiac Monitoring: None  Code Status: Full Code      Clinically Significant Risk Factors Present on Admission                              # Obesity: Estimated body mass index is 31.53 kg/m  as calculated from the following:    Height as of this encounter: 1.6 m (5' 3\").    Weight as of this encounter: 80.7 kg (178 lb).              Disposition Plan     Medically Ready for Discharge: Anticipated Tomorrow           The patient's care was discussed with the Bedside Nurse and Patient.    Debbie Talley NP-S  [facility]ist Service  [facility]  Securely message with [employer] (more info)  Text page via [facility] Paging/Directory   ______________________________________________________________________    Interval History   Pt seen and assessed at 0930, reporting absence of abdominal pain and nausea. Last BM was yesterday. Encouraged pt to sit in chair a bedside and attempt PO liquids/food as tolerated. Pt reports lack of sleep overnight. 2L NC in place for 86-90% on RA while sleeping. She does not wear oxygen at baseline.     Around 1230, Nursing staff reported recurrence of nausea, vomiting x 1, and ABD pain after receiving PO dilaudid on an empty stomach. Po bentyl, and Maalox were also vomited.  Not tolerating po intake    Physical Exam   Vital Signs: Temp: 99.1  F (37.3  C) Temp src: Oral BP: 137/66 Pulse: 81   Resp: 16 SpO2: 91 % O2 Device: Nasal cannula Oxygen Delivery: 1 LPM  Weight: 178 lbs 0 oz    GENERAL: " Comfortable. Resting in bed. Reports fatigue.  PSYCH: Pleasant, oriented, No acute distress.  HEENT:  Atraumatic, normocephalic. Bilateral surgical sites CDI with steri strips s/p bilateral blepharoplasty, normal hearing, and oropharynx is normal.   NECK:  Supple, no neck vein distention.   HEART:  Normal S1, S2 with no murmur, no pericardial rub, gallops or S3 or S4.  LUNGS:  Clear to auscultation, normal Respiratory effort. No wheezing, rales or ronchi. Nasal cannula in  place (RA at baseline).  GI:  Soft, normal bowel sounds. Non-tender, non distended.   EXTREMITIES: Moves all 4 extremities independently.   SKIN:  Dry to touch, No rash, wound or ulcerations.  NEUROLOGIC: Grossly normal. Drowsy, responds to verbal stimuli, however, frequently falling asleep in conversation.     Medical Decision Making       65 MINUTES SPENT BY ME on the date of service doing chart review, history, exam, documentation & further activities per the note.      Data     I have personally reviewed the following data over the past 24 hrs:    13.7 (H)  \   11.5 (L)   / 329     135 103 7.7 (L) /  158 (H)   4.2 19 (L) 0.80 \     TSH: N/A T4: N/A A1C: 5.5       Imaging results reviewed over the past 24 hrs:   No results found for this or any previous visit (from the past 24 hour(s)).

## 2024-06-24 NOTE — PLAN OF CARE
"PRIMARY DIAGNOSIS: ACUTE ENTEROCOLITIS   OUTPATIENT/OBSERVATION GOALS TO BE MET BEFORE DISCHARGE:  ADLs back to baseline: Yes    Activity and level of assistance: Up with standby assistance.    Pain status: Improved-controlled with oral pain medications.    Return to near baseline physical activity: Yes     Discharge Planner Nurse   Safe discharge environment identified: Yes  Barriers to discharge: Yes       Entered by: Homa Brown RN 06/23/2024    A&O x4. VSS, RA. Up w/ SBA. NS infusing at 100 ml/hr. CLD-- poor appetite, but pt reports no N/V at this time. Pain is 4/10-- tolerable at this time.   Please review provider order for any additional goals.   Nurse to notify provider when observation goals have been met and patient is ready for discharge.  Goal Outcome Evaluation:      Plan of Care Reviewed With: patient    Overall Patient Progress: improvingOverall Patient Progress: improving    Outcome Evaluation: Tolerating CLD. Abd pain controlled w/ PRNs and rest.    Problem: Adult Inpatient Plan of Care  Goal: Plan of Care Review  Description: The Plan of Care Review/Shift note should be completed every shift.  The Outcome Evaluation is a brief statement about your assessment that the patient is improving, declining, or no change.  This information will be displayed automatically on your shift  note.  Outcome: Progressing  Flowsheets (Taken 6/23/2024 2236)  Outcome Evaluation: Tolerating CLD. Abd pain controlled w/ PRNs and rest.  Plan of Care Reviewed With: patient  Overall Patient Progress: improving  Goal: Patient-Specific Goal (Individualized)  Description: You can add care plan individualizations to a care plan. Examples of Individualization might be:  \"Parent requests to be called daily at 9am for status\", \"I have a hard time hearing out of my right ear\", or \"Do not touch me to wake me up as it startles  me\".  Outcome: Progressing  Goal: Absence of Hospital-Acquired Illness or Injury  Outcome: " Progressing  Intervention: Identify and Manage Fall Risk  Recent Flowsheet Documentation  Taken 6/23/2024 2040 by Homa Gutierrez, RN  Safety Promotion/Fall Prevention:   activity supervised   clutter free environment maintained   nonskid shoes/slippers when out of bed   patient and family education   safety round/check completed   treat reversible contributory factors   treat underlying cause  Intervention: Prevent Infection  Recent Flowsheet Documentation  Taken 6/23/2024 2040 by Homa Gutierrez, RN  Infection Prevention:   single patient room provided   rest/sleep promoted  Goal: Optimal Comfort and Wellbeing  Outcome: Progressing  Goal: Readiness for Transition of Care  Outcome: Progressing

## 2024-06-24 NOTE — PLAN OF CARE
"PRIMARY DIAGNOSIS: ACUTE ENTEROCOLITIS   OUTPATIENT/OBSERVATION GOALS TO BE MET BEFORE DISCHARGE:  ADLs back to baseline: Yes    Activity and level of assistance: Up with standby assistance.    Pain status: Improved-controlled with oral pain medications.    Return to near baseline physical activity: Yes     Discharge Planner Nurse   Safe discharge environment identified: Yes  Barriers to discharge: Yes       Entered by: Homa Brown RN 06/23/2024    A&O x4. VSS, RA. Up w/ SBA, getting up to BR to void x2. NS infusing at 100 ml/hr. Having persistent nausea w/o emesis-- given PRN zofran-- awaiting results. Pain also persistent, but tolerable at this time.  Please review provider order for any additional goals.   Nurse to notify provider when observation goals have been met and patient is ready for discharge.  Goal Outcome Evaluation:      Plan of Care Reviewed With: patient    Overall Patient Progress: improvingOverall Patient Progress: improving    Outcome Evaluation: Tolerating CLD. Abd pain controlled w/ PRNs and rest.    Problem: Adult Inpatient Plan of Care  Goal: Plan of Care Review  Description: The Plan of Care Review/Shift note should be completed every shift.  The Outcome Evaluation is a brief statement about your assessment that the patient is improving, declining, or no change.  This information will be displayed automatically on your shift  note.  Outcome: Progressing  Flowsheets (Taken 6/23/2024 2236)  Outcome Evaluation: Tolerating CLD. Abd pain controlled w/ PRNs and rest.  Plan of Care Reviewed With: patient  Overall Patient Progress: improving  Goal: Patient-Specific Goal (Individualized)  Description: You can add care plan individualizations to a care plan. Examples of Individualization might be:  \"Parent requests to be called daily at 9am for status\", \"I have a hard time hearing out of my right ear\", or \"Do not touch me to wake me up as it startles  me\".  Outcome: Progressing  Goal: Absence " of Hospital-Acquired Illness or Injury  Outcome: Progressing  Intervention: Identify and Manage Fall Risk  Recent Flowsheet Documentation  Taken 6/23/2024 2040 by Homa Gutierrez, RN  Safety Promotion/Fall Prevention:   activity supervised   clutter free environment maintained   nonskid shoes/slippers when out of bed   patient and family education   safety round/check completed   treat reversible contributory factors   treat underlying cause  Intervention: Prevent Infection  Recent Flowsheet Documentation  Taken 6/23/2024 2040 by Homa Gutierrez, RN  Infection Prevention:   single patient room provided   rest/sleep promoted  Goal: Optimal Comfort and Wellbeing  Outcome: Progressing  Goal: Readiness for Transition of Care  Outcome: Progressing

## 2024-06-24 NOTE — PLAN OF CARE
"PRIMARY DIAGNOSIS: Abdominal Pain  OUTPATIENT/OBSERVATION GOALS TO BE MET BEFORE DISCHARGE:  ADLs back to baseline: Yes    Activity and level of assistance: Up with standby assistance.    Pain status: Improved-controlled with oral pain medications.    Return to near baseline physical activity: Yes     Discharge Planner Nurse   Safe discharge environment identified: Yes  Barriers to discharge: No       Entered by: Krystle Mckeon RN 06/24/2024 9:45 AM   Denies nausea. States pain is reduced. SBA. O2 saturation 91-93% on RA when awake. O2 sats when in a deep sleep 86-91% on RA. Utilizing NC when sleeping to maintain O2 sats > 92%.   Please review provider order for any additional goals.   Nurse to notify provider when observation goals have been met and patient is ready for discharge.Goal Outcome Evaluation:      Plan of Care Reviewed With: patient    Overall Patient Progress: improvingOverall Patient Progress: improving    Outcome Evaluation: Denies nausea      Problem: Adult Inpatient Plan of Care  Goal: Plan of Care Review  Description: The Plan of Care Review/Shift note should be completed every shift.  The Outcome Evaluation is a brief statement about your assessment that the patient is improving, declining, or no change.  This information will be displayed automatically on your shift  note.  Outcome: Progressing  Flowsheets (Taken 6/24/2024 0997)  Outcome Evaluation: Denies nausea  Plan of Care Reviewed With: patient  Overall Patient Progress: improving  Goal: Patient-Specific Goal (Individualized)  Description: You can add care plan individualizations to a care plan. Examples of Individualization might be:  \"Parent requests to be called daily at 9am for status\", \"I have a hard time hearing out of my right ear\", or \"Do not touch me to wake me up as it startles  me\".  Outcome: Progressing  Goal: Absence of Hospital-Acquired Illness or Injury  Outcome: Progressing  Intervention: Prevent Skin Injury  Recent " Flowsheet Documentation  Taken 6/24/2024 0812 by Krystle Mckeon, RN  Body Position: position changed independently  Goal: Optimal Comfort and Wellbeing  Outcome: Progressing  Goal: Readiness for Transition of Care  Outcome: Progressing

## 2024-06-25 PROBLEM — D72.829 LEUKOCYTOSIS: Status: ACTIVE | Noted: 2024-06-25

## 2024-06-25 LAB
ANION GAP SERPL CALCULATED.3IONS-SCNC: 13 MMOL/L (ref 7–15)
BASOPHILS # BLD AUTO: 0.1 10E3/UL (ref 0–0.2)
BASOPHILS NFR BLD AUTO: 0 %
BUN SERPL-MCNC: 11.8 MG/DL (ref 8–23)
CALCIUM SERPL-MCNC: 8.5 MG/DL (ref 8.8–10.2)
CHLORIDE SERPL-SCNC: 99 MMOL/L (ref 98–107)
CREAT SERPL-MCNC: 0.69 MG/DL (ref 0.51–0.95)
DEPRECATED HCO3 PLAS-SCNC: 20 MMOL/L (ref 22–29)
EGFRCR SERPLBLD CKD-EPI 2021: >90 ML/MIN/1.73M2
EOSINOPHIL # BLD AUTO: 0 10E3/UL (ref 0–0.7)
EOSINOPHIL NFR BLD AUTO: 0 %
ERYTHROCYTE [DISTWIDTH] IN BLOOD BY AUTOMATED COUNT: 13.3 % (ref 10–15)
GLUCOSE SERPL-MCNC: 152 MG/DL (ref 70–99)
HCT VFR BLD AUTO: 37 % (ref 35–47)
HGB BLD-MCNC: 11.8 G/DL (ref 11.7–15.7)
IMM GRANULOCYTES # BLD: 0.1 10E3/UL
IMM GRANULOCYTES NFR BLD: 0 %
LYMPHOCYTES # BLD AUTO: 1.6 10E3/UL (ref 0.8–5.3)
LYMPHOCYTES NFR BLD AUTO: 12 %
MCH RBC QN AUTO: 29.6 PG (ref 26.5–33)
MCHC RBC AUTO-ENTMCNC: 31.9 G/DL (ref 31.5–36.5)
MCV RBC AUTO: 93 FL (ref 78–100)
MONOCYTES # BLD AUTO: 1.2 10E3/UL (ref 0–1.3)
MONOCYTES NFR BLD AUTO: 9 %
NEUTROPHILS # BLD AUTO: 10.8 10E3/UL (ref 1.6–8.3)
NEUTROPHILS NFR BLD AUTO: 78 %
NRBC # BLD AUTO: 0 10E3/UL
NRBC BLD AUTO-RTO: 0 /100
PLATELET # BLD AUTO: 322 10E3/UL (ref 150–450)
POTASSIUM SERPL-SCNC: 4.4 MMOL/L (ref 3.4–5.3)
RBC # BLD AUTO: 3.98 10E6/UL (ref 3.8–5.2)
SODIUM SERPL-SCNC: 132 MMOL/L (ref 135–145)
WBC # BLD AUTO: 13.8 10E3/UL (ref 4–11)

## 2024-06-25 PROCEDURE — 120N000001 HC R&B MED SURG/OB

## 2024-06-25 PROCEDURE — 250N000013 HC RX MED GY IP 250 OP 250 PS 637: Performed by: PHYSICIAN ASSISTANT

## 2024-06-25 PROCEDURE — 250N000011 HC RX IP 250 OP 636: Mod: JZ | Performed by: PHYSICIAN ASSISTANT

## 2024-06-25 PROCEDURE — 80048 BASIC METABOLIC PNL TOTAL CA: CPT | Performed by: PHYSICIAN ASSISTANT

## 2024-06-25 PROCEDURE — 36415 COLL VENOUS BLD VENIPUNCTURE: CPT | Performed by: PHYSICIAN ASSISTANT

## 2024-06-25 PROCEDURE — 99233 SBSQ HOSP IP/OBS HIGH 50: CPT | Performed by: PHYSICIAN ASSISTANT

## 2024-06-25 PROCEDURE — 99418 PROLNG IP/OBS E/M EA 15 MIN: CPT | Performed by: PHYSICIAN ASSISTANT

## 2024-06-25 PROCEDURE — 85025 COMPLETE CBC W/AUTO DIFF WBC: CPT | Performed by: PHYSICIAN ASSISTANT

## 2024-06-25 PROCEDURE — G0378 HOSPITAL OBSERVATION PER HR: HCPCS

## 2024-06-25 RX ORDER — ENOXAPARIN SODIUM 100 MG/ML
40 INJECTION SUBCUTANEOUS EVERY 24 HOURS
Status: DISCONTINUED | OUTPATIENT
Start: 2024-06-25 | End: 2024-07-06 | Stop reason: HOSPADM

## 2024-06-25 RX ADMIN — ACETAMINOPHEN 1000 MG: 500 TABLET, FILM COATED ORAL at 08:08

## 2024-06-25 RX ADMIN — DICYCLOMINE HYDROCHLORIDE 10 MG: 10 CAPSULE ORAL at 08:08

## 2024-06-25 RX ADMIN — ALUMINUM HYDROXIDE, MAGNESIUM HYDROXIDE, AND SIMETHICONE 30 ML: 1200; 120; 1200 SUSPENSION ORAL at 20:14

## 2024-06-25 RX ADMIN — Medication 1 CAPSULE: at 08:08

## 2024-06-25 RX ADMIN — Medication 1 CAPSULE: at 20:05

## 2024-06-25 RX ADMIN — ONDANSETRON 4 MG: 4 TABLET, ORALLY DISINTEGRATING ORAL at 17:34

## 2024-06-25 RX ADMIN — DICYCLOMINE HYDROCHLORIDE 10 MG: 10 CAPSULE ORAL at 17:20

## 2024-06-25 RX ADMIN — ACETAMINOPHEN 1000 MG: 500 TABLET, FILM COATED ORAL at 01:28

## 2024-06-25 RX ADMIN — DICYCLOMINE HYDROCHLORIDE 10 MG: 10 CAPSULE ORAL at 12:23

## 2024-06-25 RX ADMIN — GABAPENTIN 900 MG: 300 CAPSULE ORAL at 14:00

## 2024-06-25 RX ADMIN — GABAPENTIN 900 MG: 300 CAPSULE ORAL at 20:05

## 2024-06-25 RX ADMIN — DICYCLOMINE HYDROCHLORIDE 10 MG: 10 CAPSULE ORAL at 20:05

## 2024-06-25 RX ADMIN — ACETAMINOPHEN 1000 MG: 500 TABLET, FILM COATED ORAL at 14:00

## 2024-06-25 RX ADMIN — ENOXAPARIN SODIUM 40 MG: 40 INJECTION SUBCUTANEOUS at 17:20

## 2024-06-25 RX ADMIN — SERTRALINE 100 MG: 100 TABLET, FILM COATED ORAL at 08:32

## 2024-06-25 RX ADMIN — GABAPENTIN 900 MG: 300 CAPSULE ORAL at 08:08

## 2024-06-25 RX ADMIN — HYDROMORPHONE HYDROCHLORIDE 0.2 MG: 0.2 INJECTION, SOLUTION INTRAMUSCULAR; INTRAVENOUS; SUBCUTANEOUS at 17:39

## 2024-06-25 RX ADMIN — ACETAMINOPHEN 1000 MG: 500 TABLET, FILM COATED ORAL at 20:06

## 2024-06-25 ASSESSMENT — ACTIVITIES OF DAILY LIVING (ADL)
ADLS_ACUITY_SCORE: 36

## 2024-06-25 NOTE — PLAN OF CARE
"  Problem: Adult Inpatient Plan of Care  Goal: Plan of Care Review  Description: The Plan of Care Review/Shift note should be completed every shift.  The Outcome Evaluation is a brief statement about your assessment that the patient is improving, declining, or no change.  This information will be displayed automatically on your shift  note.  Outcome: Progressing  Flowsheets (Taken 6/25/2024 0818)  Outcome Evaluation: passing gas up to bathroom  with stand by assist.  Plan of Care Reviewed With: patient  Overall Patient Progress: improving  Goal: Patient-Specific Goal (Individualized)  Description: You can add care plan individualizations to a care plan. Examples of Individualization might be:  \"Parent requests to be called daily at 9am for status\", \"I have a hard time hearing out of my right ear\", or \"Do not touch me to wake me up as it startles  me\".  Outcome: Progressing  Goal: Absence of Hospital-Acquired Illness or Injury  Outcome: Progressing  Goal: Optimal Comfort and Wellbeing  Outcome: Progressing  Goal: Readiness for Transition of Care  Outcome: Progressing   Goal Outcome Evaluation:      Plan of Care Reviewed With: patient    Overall Patient Progress: improvingOverall Patient Progress: improving    Outcome Evaluation: passing gas up to bathroom  with stand by assist.  PRIMARY DIAGNOSIS: Enterocolitis  OUTPATIENT/OBSERVATION GOALS TO BE MET BEFORE DISCHARGE:  1. Pain Status: Improved-controlled with oral pain medications.    2. Return to near baseline physical activity: Yes    3. Cleared for discharge by consultants (if involved): No    Discharge Planner Nurse   Safe discharge environment identified: Yes  Barriers to discharge: Yes       Entered by: Nai Nielson RN 06/25/2024 8:22 AM     Please review provider order for any additional goals.   Nurse to notify provider when observation goals have been met and patient is ready for discharge.    "

## 2024-06-25 NOTE — PLAN OF CARE
"PRIMARY DIAGNOSIS: ABDOMINAL PAIN, NAUSEA & VOMITING  OUTPATIENT/OBSERVATION GOALS TO BE MET BEFORE DISCHARGE:  1. Pain Status: Improved-controlled with oral pain medications.    2. Return to near baseline physical activity: No    3. Cleared for discharge by consultants (if involved): No    Discharge Planner Nurse   Safe discharge environment identified: Yes  Barriers to discharge: Yes       Entered by: Emma Durán RN 06/25/2024 6:06 AM  BP (!) 140/83 (BP Location: Right arm)   Pulse 119   Temp 99.3  F (37.4  C) (Oral)   Resp 18   Ht 1.6 m (5' 3\")   Wt 85.1 kg (187 lb 11.2 oz)   SpO2 92%   BMI 33.25 kg/m     Alert & oriented x 4. VSS. SBA with mobility Has remained on 2L NC  to keep sats above 90. Sutures to upper eyelids and eyebrow area CDI. Isolation precautions maintained.Unable to collect stool sample tonight.       Please review provider order for any additional goals.   Nurse to notify provider when observation goals have been met and patient is ready for discharge.Goal Outcome Evaluation:      Plan of Care Reviewed With: patient    Problem: Adult Inpatient Plan of Care  Goal: Plan of Care Review  Description: The Plan of Care Review/Shift note should be completed every shift.  The Outcome Evaluation is a brief statement about your assessment that the patient is improving, declining, or no change.  This information will be displayed automatically on your shift  note.  Outcome: Progressing  Flowsheets (Taken 6/24/2024 2158)  Plan of Care Reviewed With: patient  Overall Patient Progress: no change  Goal: Patient-Specific Goal (Individualized)  Description: You can add care plan individualizations to a care plan. Examples of Individualization might be:  \"Parent requests to be called daily at 9am for status\", \"I have a hard time hearing out of my right ear\", or \"Do not touch me to wake me up as it startles  me\".  Outcome: Progressing  Goal: Absence of Hospital-Acquired Illness or Injury  Outcome: " Progressing  Goal: Optimal Comfort and Wellbeing  Outcome: Progressing  Goal: Readiness for Transition of Care  Outcome: Progressing  Flowsheets (Taken 6/24/2024 2158)  Concerns to be Addressed: discharge planning  Intervention: Mutually Develop Transition Plan  Recent Flowsheet Documentation  Taken 6/24/2024 2158 by Emma Durán RN  Concerns to be Addressed: discharge planning       Overall Patient Progress: no changeOverall Patient Progress: no change

## 2024-06-25 NOTE — PLAN OF CARE
"  Problem: Adult Inpatient Plan of Care  Goal: Plan of Care Review  Description: The Plan of Care Review/Shift note should be completed every shift.  The Outcome Evaluation is a brief statement about your assessment that the patient is improving, declining, or no change.  This information will be displayed automatically on your shift  note.  Outcome: Progressing  Flowsheets (Taken 6/25/2024 0818)  Outcome Evaluation: passing gas up to bathroom  with stand by assist.  Plan of Care Reviewed With: patient  Overall Patient Progress: improving  Goal: Patient-Specific Goal (Individualized)  Description: You can add care plan individualizations to a care plan. Examples of Individualization might be:  \"Parent requests to be called daily at 9am for status\", \"I have a hard time hearing out of my right ear\", or \"Do not touch me to wake me up as it startles  me\".  Outcome: Progressing  Goal: Absence of Hospital-Acquired Illness or Injury  Outcome: Progressing  Goal: Optimal Comfort and Wellbeing  Outcome: Progressing  Goal: Readiness for Transition of Care  Outcome: Progressing   Goal Outcome Evaluation:      Plan of Care Reviewed With: patient    Overall Patient Progress: improvingOverall Patient Progress: improving    Outcome Evaluation: passing gas up to bathroom  with stand by assist.  PRIMARY DIAGNOSIS: Enterocolitis  OUTPATIENT/OBSERVATION GOALS TO BE MET BEFORE DISCHARGE:  1. Pain Status: Improved-controlled with oral pain medications.    2. Return to near baseline physical activity: Yes    3. Cleared for discharge by consultants (if involved): No    Discharge Planner Nurse   Safe discharge environment identified: Yes  Barriers to discharge: Yes       Entered by: Nai Nielson RN 06/25/2024 12:02 PM     Please review provider order for any additional goals.   Nurse to notify provider when observation goals have been met and patient is ready for discharge.    "

## 2024-06-25 NOTE — PROGRESS NOTES
[facility]    Medicine Progress Note - Hospitalist Service    Date of Admission:  6/23/2024    Assessment & Plan   Yoselin Zelaya is a 73 year old female with a PMH significant for remote history of gastric bypass, TIA, iron deficiency anemia, and history of subarachnoid hemorrhage due to fall, who was admitted on 6/23/2024 with acute enterocolitis.     #Acute enterocolitis - possible food borne illness vs viral etiology   #Suspect acute ileus  Acute episode of abdominal pain, nausea, vomiting and diarrhea about 24-hour after eating out at a restaurant. Her son-in-law had similar symptoms but quickly resolved.  Possible food poisoning versus viral gastroenteritis.  Labs are reassuring, normal lactic acid, lipase slightly elevated, likely due to vomiting. WBC elevated to 11.4 on admission, up to 13.7-->13.8 stable today.   No further diarrhea since admission, not passing gas. Notes improvement in abd pain today but notes abdominal distension. Nausea improved. Tolerating very small amounts of full liquids  Bowel sounds are now hypoactive, abd distended.   Suspect ileus now  - ok to continue with full liquids only for now, do not advance today  - has been drinking water, continue to push oral hydration rather than IVFs for now  - ambulate 4 times daily   - Continue supportive care with antiemetics and pain meds as needed. Avoid narcotics if able  - consider suppository   - add Bentyl and PRN Maalox  - discontinue enteric panel and precautions given no diarrhea for >24 hrs  - remote hx of gastric bypass, occasional diarrhea is not unusual for her.   - consider abd XR if sx worsen  - admit to inpatient given presumed ileus     #Hyperglycemia  Glucose 218 in ED, 156 on fasting labs  - HgbA1c added on, 5.5  - Suspect reactive to vomiting.     #Hyponatremia  Mild, 132. Due to poor po intake  - recheck in AM    #Recent blepharoplasty for droopy eyelids  - Just had surgery this past Wednesday  -  "States did not require pre and postop antibiotic  - Appears well-healed  - Follow-up with plastic surgery as outpatient, supposed to follow up tomorrow with suture removal     #Depression/anxiety  - Continue Zoloft  - Patient takes as needed propranolol for anxiety, continue     #Neuropathy  - Continue PTA gabapentin     #Incidental finding:   - small pulmonary nodule up to 3 mm right middle lobe will need further outpt monitoring in 12 mos       #NATAN  #Intermittent hypoxia  Pt requiring O2 at night, suspect due to NATAN. Pt has intermittently required O2 during the day as well, suspect this is due to hypoventilation due to illness.   When awake and alert, she is not requiring O2 6/25        Diet: Advance Diet as Tolerated: Full Liquid Diet; Regular Diet Adult    DVT Prophylaxis: Enoxaparin (Lovenox) SQ  Otero Catheter: Not present  Lines: None     Cardiac Monitoring: None  Code Status: Full Code      Clinically Significant Risk Factors Present on Admission                              # Obesity: Estimated body mass index is 33.25 kg/m  as calculated from the following:    Height as of this encounter: 1.6 m (5' 3\").    Weight as of this encounter: 85.1 kg (187 lb 11.2 oz).              Disposition Plan     Medically Ready for Discharge: Anticipated in 2-4 Days           The patient's care was discussed with the Attending Physician, Dr. Zuniga, Bedside Nurse, Patient, and Patient's Family.    Elly Gutierrez PA-C  Hospitalist Service  [facility]  Securely message with [employer] (more info)  Text page via [facility] Paging/Directory   ______________________________________________________________________    Interval History   Nausea and abdominal pain are improved but notes abdominal distension and has not passed gas or had a BM since admission    Physical Exam   Vital Signs: Temp: 98.7  F (37.1  C) Temp src: Oral BP: 117/66 Pulse: 119   Resp: 18 SpO2: 90 % O2 Device: Nasal cannula Oxygen Delivery: 2 " LPM  Weight: 187 lbs 11.2 oz    GENERAL:  Comfortable.  PSYCH: pleasant, oriented, No acute distress.  HEART:  Normal S1, S2 with no murmur, no pericardial rub, gallops or S3 or S4.  LUNGS:  Clear to auscultation, normal Respiratory effort. No wheezing, rales or ronchi.  GI:  Distended, somewhat firm, hypoactive bowel sounds. Mildly tender.   EXTREMITIES:  able to ambulate independently   NEUROLOGIC:  grossly    Medical Decision Making       65 MINUTES SPENT BY ME on the date of service doing chart review, history, exam, documentation & further activities per the note.      Data     I have personally reviewed the following data over the past 24 hrs:    13.8 (H)  \   11.8   / 322     132 (L) 99 11.8 /  152 (H)   4.4 20 (L) 0.69 \       Imaging results reviewed over the past 24 hrs:   No results found for this or any previous visit (from the past 24 hour(s)).

## 2024-06-25 NOTE — PLAN OF CARE
"Goal Outcome Evaluation:      Plan of Care Reviewed With: patient        Vitals: BP (!) 146/80 (BP Location: Right arm)   Pulse 109   Temp 98.4  F (36.9  C) (Oral)   Resp 20   Ht 1.6 m (5' 3\")   Wt 85.1 kg (187 lb 11.2 oz)   SpO2 90%   BMI 33.25 kg/m    Cardiac: hypertensive  Respiratory: RA during day, 2L/CPAP HS; dyspnea on exertion   Neuro: A/Ox4  GI/: voiding per bathroom, constipation/not passing gas; intermittent nausea  Skin: WNL  LDAs: PIV SL  Diet: full liquid  Activity: SBA  Pain: prn dilaudid given per pt request   Plan: pain control, anti-emetics, encourage PO intake, encourage ambulation         Problem: Adult Inpatient Plan of Care  Goal: Plan of Care Review  Description: The Plan of Care Review/Shift note should be completed every shift.  The Outcome Evaluation is a brief statement about your assessment that the patient is improving, declining, or no change.  This information will be displayed automatically on your shift  note.  Outcome: Progressing  Flowsheets (Taken 6/25/2024 1849)  Plan of Care Reviewed With: patient  Goal: Patient-Specific Goal (Individualized)  Description: You can add care plan individualizations to a care plan. Examples of Individualization might be:  \"Parent requests to be called daily at 9am for status\", \"I have a hard time hearing out of my right ear\", or \"Do not touch me to wake me up as it startles  me\".  Outcome: Progressing  Goal: Absence of Hospital-Acquired Illness or Injury  Outcome: Progressing  Intervention: Identify and Manage Fall Risk  Recent Flowsheet Documentation  Taken 6/25/2024 1800 by Ofelia Singleton, RN  Safety Promotion/Fall Prevention:   safety round/check completed   supervised activity  Goal: Optimal Comfort and Wellbeing  Outcome: Progressing  Intervention: Monitor Pain and Promote Comfort  Recent Flowsheet Documentation  Taken 6/25/2024 1757 by Ofelia Singleton, RN  Pain Management Interventions: medication (see MAR)  Taken 6/25/2024 1739 " by Ofelia Singleton, RN  Pain Management Interventions: medication (see MAR)  Taken 6/25/2024 1720 by Ofelia Singleton, RN  Pain Management Interventions:   repositioned   rest  Goal: Readiness for Transition of Care  Outcome: Progressing

## 2024-06-25 NOTE — PLAN OF CARE
"PRIMARY DIAGNOSIS: ABDOMINAL PAIN, NAUSEA & VOMITING  OUTPATIENT/OBSERVATION GOALS TO BE MET BEFORE DISCHARGE:  1. Pain Status: Improved-controlled with oral pain medications.    2. Return to near baseline physical activity: No    3. Cleared for discharge by consultants (if involved): No    Discharge Planner Nurse   Safe discharge environment identified: Yes  Barriers to discharge: Yes       Entered by: Emma Durán RN 06/24/2024 9:59 PM  BP (!) 160/80 (BP Location: Right arm)   Pulse 120   Temp 98.6  F (37  C) (Oral)   Resp 16   Ht 1.6 m (5' 3\")   Wt 80.7 kg (178 lb)   SpO2 91%   BMI 31.53 kg/m     Alert & oriented x 4. VSS. SBA with mobility Has remained on 2L NC  to keep sats above 90. Sutures to upper eyelids and eyebrow area CDI.Unable to collect stool sample tonight.       Please review provider order for any additional goals.   Nurse to notify provider when observation goals have been met and patient is ready for discharge.Goal Outcome Evaluation:      Plan of Care Reviewed With: patient    Problem: Adult Inpatient Plan of Care  Goal: Plan of Care Review  Description: The Plan of Care Review/Shift note should be completed every shift.  The Outcome Evaluation is a brief statement about your assessment that the patient is improving, declining, or no change.  This information will be displayed automatically on your shift  note.  Outcome: Progressing  Flowsheets (Taken 6/24/2024 2158)  Plan of Care Reviewed With: patient  Overall Patient Progress: no change  Goal: Patient-Specific Goal (Individualized)  Description: You can add care plan individualizations to a care plan. Examples of Individualization might be:  \"Parent requests to be called daily at 9am for status\", \"I have a hard time hearing out of my right ear\", or \"Do not touch me to wake me up as it startles  me\".  Outcome: Progressing  Goal: Absence of Hospital-Acquired Illness or Injury  Outcome: Progressing  Goal: Optimal Comfort and " Wellbeing  Outcome: Progressing  Goal: Readiness for Transition of Care  Outcome: Progressing  Flowsheets (Taken 6/24/2024 2158)  Concerns to be Addressed: discharge planning  Intervention: Mutually Develop Transition Plan  Recent Flowsheet Documentation  Taken 6/24/2024 2158 by Emma Durán RN  Concerns to be Addressed: discharge planning       Overall Patient Progress: no changeOverall Patient Progress: no change

## 2024-06-25 NOTE — UTILIZATION REVIEW
Admission Status; Secondary Review Determination    Under the authority of the Utilization Management Committee, the utilization review process indicated a secondary review on the above patient. The review outcome is based on review of the medical records, discussions with staff, and applying clinical experience noted on the date of the review.    (x) Inpatient Status Appropriate - This patient's medical care is consistent with medical management for inpatient care and reasonable inpatient medical practice.    RATIONALE FOR DETERMINATION    73-year-old female with a history of gastric bypass, TIA, iron deficiency anemia, and subarachnoid hemorrhage due to a fall, was admitted on 6/23/2024 with acute enterocolitis, likely due to foodborne illness or viral gastroenteritis. She presented with abdominal pain, nausea, vomiting, and diarrhea. She continued with Poor intake, Lethargic and Intermittent nausea on hospital D#2 requiring continuing inpatient stay.. Her WBC count increased from 11.4 to 13.8, and she has abdominal distension with hypoactive bowel sounds, suggesting an acute ileus. Labs also reveal hyponatremia, low bicarbonate and hyperglycemia (glucose 218 in ED, 156 fasting) .     The patient requires inpatient admission due to the suspected acute ileus, which poses a significant risk for complications such as bowel obstruction, severe dehydration, and electrolyte imbalances that cannot be managed effectively in an outpatient setting. Her elevated WBC count and abdominal distension indicate ongoing inflammatory or infectious processes that necessitate close monitoring and supportive care.     The expected length of stay at the time of admission was more than 2 nights because of the severity of illness, intensity of service provided, and risk for adverse outcome. Inpatient admission is appropriate.    This document was produced using voice recognition software    The information on this document is developed  by the utilization review team in order for the business office to ensure compliance. This only denotes the appropriateness of proper admission status and does not reflect the quality of care rendered.    The definitions of Inpatient Status and Observation Status used in making the determination above are those provided in the CMS Coverage Manual, Chapter 1 and Chapter 6, section 70.4.    Sincerely,      Abhay Santizo MD FACP  Utilization Management    [facility].

## 2024-06-26 ENCOUNTER — APPOINTMENT (OUTPATIENT)
Dept: GENERAL RADIOLOGY | Facility: CLINIC | Age: 74
DRG: 329 | End: 2024-06-26
Attending: PHYSICIAN ASSISTANT
Payer: MEDICARE

## 2024-06-26 ENCOUNTER — APPOINTMENT (OUTPATIENT)
Dept: GENERAL RADIOLOGY | Facility: CLINIC | Age: 74
DRG: 329 | End: 2024-06-26
Attending: INTERNAL MEDICINE
Payer: MEDICARE

## 2024-06-26 LAB
ANION GAP SERPL CALCULATED.3IONS-SCNC: 11 MMOL/L (ref 7–15)
BUN SERPL-MCNC: 14.8 MG/DL (ref 8–23)
CALCIUM SERPL-MCNC: 8.6 MG/DL (ref 8.8–10.2)
CHLORIDE SERPL-SCNC: 94 MMOL/L (ref 98–107)
CREAT SERPL-MCNC: 0.7 MG/DL (ref 0.51–0.95)
DEPRECATED HCO3 PLAS-SCNC: 24 MMOL/L (ref 22–29)
EGFRCR SERPLBLD CKD-EPI 2021: 90 ML/MIN/1.73M2
ERYTHROCYTE [DISTWIDTH] IN BLOOD BY AUTOMATED COUNT: 13.2 % (ref 10–15)
GLUCOSE SERPL-MCNC: 134 MG/DL (ref 70–99)
HCT VFR BLD AUTO: 32 % (ref 35–47)
HGB BLD-MCNC: 10.5 G/DL (ref 11.7–15.7)
LACTATE SERPL-SCNC: 1 MMOL/L (ref 0.7–2)
MCH RBC QN AUTO: 29.9 PG (ref 26.5–33)
MCHC RBC AUTO-ENTMCNC: 32.8 G/DL (ref 31.5–36.5)
MCV RBC AUTO: 91 FL (ref 78–100)
PLATELET # BLD AUTO: 302 10E3/UL (ref 150–450)
POTASSIUM SERPL-SCNC: 4.3 MMOL/L (ref 3.4–5.3)
RBC # BLD AUTO: 3.51 10E6/UL (ref 3.8–5.2)
SODIUM SERPL-SCNC: 129 MMOL/L (ref 135–145)
WBC # BLD AUTO: 9.3 10E3/UL (ref 4–11)

## 2024-06-26 PROCEDURE — 71045 X-RAY EXAM CHEST 1 VIEW: CPT

## 2024-06-26 PROCEDURE — 83605 ASSAY OF LACTIC ACID: CPT | Performed by: PHYSICIAN ASSISTANT

## 2024-06-26 PROCEDURE — 82374 ASSAY BLOOD CARBON DIOXIDE: CPT | Performed by: PHYSICIAN ASSISTANT

## 2024-06-26 PROCEDURE — 99418 PROLNG IP/OBS E/M EA 15 MIN: CPT | Performed by: PHYSICIAN ASSISTANT

## 2024-06-26 PROCEDURE — 85027 COMPLETE CBC AUTOMATED: CPT | Performed by: PHYSICIAN ASSISTANT

## 2024-06-26 PROCEDURE — 258N000003 HC RX IP 258 OP 636: Performed by: PHYSICIAN ASSISTANT

## 2024-06-26 PROCEDURE — 250N000011 HC RX IP 250 OP 636: Mod: JZ | Performed by: PHYSICIAN ASSISTANT

## 2024-06-26 PROCEDURE — 250N000013 HC RX MED GY IP 250 OP 250 PS 637: Performed by: PHYSICIAN ASSISTANT

## 2024-06-26 PROCEDURE — 999N000065 XR ABDOMEN PORT 1 VIEW

## 2024-06-26 PROCEDURE — 120N000001 HC R&B MED SURG/OB

## 2024-06-26 PROCEDURE — 99233 SBSQ HOSP IP/OBS HIGH 50: CPT | Performed by: PHYSICIAN ASSISTANT

## 2024-06-26 PROCEDURE — 36415 COLL VENOUS BLD VENIPUNCTURE: CPT | Performed by: PHYSICIAN ASSISTANT

## 2024-06-26 PROCEDURE — 74018 RADEX ABDOMEN 1 VIEW: CPT

## 2024-06-26 RX ORDER — SODIUM CHLORIDE, SODIUM LACTATE, POTASSIUM CHLORIDE, CALCIUM CHLORIDE 600; 310; 30; 20 MG/100ML; MG/100ML; MG/100ML; MG/100ML
INJECTION, SOLUTION INTRAVENOUS CONTINUOUS
Status: DISCONTINUED | OUTPATIENT
Start: 2024-06-26 | End: 2024-06-28

## 2024-06-26 RX ADMIN — DICYCLOMINE HYDROCHLORIDE 10 MG: 10 CAPSULE ORAL at 17:01

## 2024-06-26 RX ADMIN — HYDROMORPHONE HYDROCHLORIDE 0.4 MG: 0.2 INJECTION, SOLUTION INTRAMUSCULAR; INTRAVENOUS; SUBCUTANEOUS at 10:44

## 2024-06-26 RX ADMIN — ONDANSETRON 4 MG: 2 INJECTION INTRAMUSCULAR; INTRAVENOUS at 10:44

## 2024-06-26 RX ADMIN — DICYCLOMINE HYDROCHLORIDE 10 MG: 10 CAPSULE ORAL at 21:41

## 2024-06-26 RX ADMIN — ACETAMINOPHEN 1000 MG: 500 TABLET, FILM COATED ORAL at 01:35

## 2024-06-26 RX ADMIN — Medication 1 CAPSULE: at 21:41

## 2024-06-26 RX ADMIN — DICYCLOMINE HYDROCHLORIDE 10 MG: 10 CAPSULE ORAL at 09:14

## 2024-06-26 RX ADMIN — GABAPENTIN 900 MG: 300 CAPSULE ORAL at 21:41

## 2024-06-26 RX ADMIN — SODIUM CHLORIDE, POTASSIUM CHLORIDE, SODIUM LACTATE AND CALCIUM CHLORIDE: 600; 310; 30; 20 INJECTION, SOLUTION INTRAVENOUS at 11:22

## 2024-06-26 RX ADMIN — ENOXAPARIN SODIUM 40 MG: 40 INJECTION SUBCUTANEOUS at 17:01

## 2024-06-26 RX ADMIN — ACETAMINOPHEN 1000 MG: 500 TABLET, FILM COATED ORAL at 09:14

## 2024-06-26 RX ADMIN — ONDANSETRON 4 MG: 2 INJECTION INTRAMUSCULAR; INTRAVENOUS at 01:44

## 2024-06-26 RX ADMIN — SODIUM CHLORIDE, POTASSIUM CHLORIDE, SODIUM LACTATE AND CALCIUM CHLORIDE: 600; 310; 30; 20 INJECTION, SOLUTION INTRAVENOUS at 21:44

## 2024-06-26 RX ADMIN — Medication 1 CAPSULE: at 09:13

## 2024-06-26 RX ADMIN — GABAPENTIN 900 MG: 300 CAPSULE ORAL at 09:13

## 2024-06-26 RX ADMIN — ACETAMINOPHEN 1000 MG: 500 TABLET, FILM COATED ORAL at 21:41

## 2024-06-26 RX ADMIN — PROCHLORPERAZINE EDISYLATE 5 MG: 5 INJECTION INTRAMUSCULAR; INTRAVENOUS at 06:08

## 2024-06-26 RX ADMIN — HYDROMORPHONE HYDROCHLORIDE 2 MG: 2 TABLET ORAL at 05:24

## 2024-06-26 RX ADMIN — SERTRALINE 100 MG: 100 TABLET, FILM COATED ORAL at 09:14

## 2024-06-26 ASSESSMENT — ACTIVITIES OF DAILY LIVING (ADL)
ADLS_ACUITY_SCORE: 26
ADLS_ACUITY_SCORE: 37
ADLS_ACUITY_SCORE: 36
ADLS_ACUITY_SCORE: 37
ADLS_ACUITY_SCORE: 36
ADLS_ACUITY_SCORE: 26
ADLS_ACUITY_SCORE: 37
ADLS_ACUITY_SCORE: 26
ADLS_ACUITY_SCORE: 36
ADLS_ACUITY_SCORE: 36
ADLS_ACUITY_SCORE: 26
ADLS_ACUITY_SCORE: 37
ADLS_ACUITY_SCORE: 37
ADLS_ACUITY_SCORE: 26

## 2024-06-26 NOTE — PROGRESS NOTES
Cross cover  Paged patient has been requiring supplemental O2 tonight.  Also noted that patient was on supplemental O2 last night  -No known history of sleep apnea  -Not oxygen dependent at home  -Will get chest x-ray  -Continue 2 L supplemental  -Monitor sats

## 2024-06-26 NOTE — PROGRESS NOTES
BRIEF NUTRITION NOTE    Chart reviewed for +MST, unknown wt loss and poor appetite.   Reviewed wt hx.   Wt appears to be stable per limited chart review. Per CE wt on 6/13/24 - 182 lbs, and wt one year ago was 179 lbs (taken on 5/16/23). Wt appears to be up trended since then.     RD changed MST score to 1.   RD will continue to stayed signed off at this time. RD will re-evaluate patient at LOS per policy guidelines, unless consulted sooner.       Shira Magallanes MS, RD, LD  Clinical Dietitian  3rd floor/ICU: [phone]  All other floors: [phone]  Weekend/holiday: [phone]  Office: [phone]

## 2024-06-26 NOTE — PROGRESS NOTES
"I was present with the student who participated in the service and in the documentation of the note. I have verified the history and personally performed the physical exam and medical decision-making. I agree with the assessment and plan of care as documented in the note. Elly Gutierrez PA-C     [facility]    Medicine Progress Note - Hospitalist Service    Date of Admission:  6/23/2024    Assessment & Plan   Yoselin Zelaya is a 73 year old female with a PMH significant for remote history of gastric bypass, TIA, iron deficiency anemia, and history of subarachnoid hemorrhage due to fall, who was admitted on 6/23/2024 with acute enterocolitis possibly due to food borne illness vs viral etiology, now appears to have ileus vs SBO.    #Acute enterocolitis - possible food borne illness vs viral etiology   #Suspect acute ileus vs small bowel obstruction  Acute episode of abdominal pain, nausea, vomiting and diarrhea about 24-hour after eating out at a restaurant. Her son-in-law had similar symptoms but quickly resolved.   Labs are reassuring, normal lactic acid on admission; repeat lactic normal, lipase slightly elevated, likely due to vomiting. WBC elevated to 11.4 on admission, up to 13.7-->13.8--> 9.3 today.   No bowel movement or diarrhea since admission, therefore, enteric stool panel not indicated.   Was improved overall 6/25 but worsened throughout the day and evening, with abdominal pain, nausea & \"green bile\" emesis x 4 overnight and this morning. Bowel sounds hypoactive and upper abdominal distension is more pronounce this morning, per pt. Not passing gas. Unable to tolerate PO intake.   - ABD XR showed multiple dilated proximal to mid bowel loops increased from previous. No free air.  - Nasogastric decompression to LIS  - NPO, bowel rest  - Multimodal analgesics & antiemetics PRN  - Restart IVFs  - Ambulate with assistance QID    #Hyperglycemia  Glucose 218 in ED, then 152 and 134 " "fasting. A1c 5.5. No DM hx.  - Suspect reactive to vomiting  - Monitor for hypo/hyperglycemia    #Hyponatremia  137 on admission. Subsequently 135, 132, and 129. Likely due to poor PO intake.  - Continue  mL/hr while NPO  - recheck in AM    #NATAN  #Intermittent hypoxia  No formal obstructive sleep apnea diagnosis. Has been requiring O2 at night during hospitalization, suspect due to NATAN. Pt has intermittently required O2 during the day as well, suspect this is due to hypoventilation due to illness.   - incentive spirometry   - Continuous pulse oximetry  - Oxygen PRN    #Recent bilateral blepharoplasty for droopy eyelids  - DOS 06/19/24 w/ plastic surgery  - Denies pre and/or postoperative antibiotics  - Surgical sites CDI x4 with steri strips and suture   - Pt missed follow-up appointment today for suture removal, due to admission.     #Depression  #Anxiety  - ok to clamp NG tube to give meds if tolerated  - continue PTA Zoloft and propranolol    #Neuropathy  - ok to clamp NG tube to give meds if tolerated  - continue PTA gabapentin      #Incidental finding  - small pulmonary nodule up to 3 mm right middle lobe, consider further outpt monitoring in 12 mos if high risk   - Pt denies any tobacco use history    #Obesity: Estimated body mass index is 33.53 kg/m  as calculated from the following:    Height as of this encounter: 1.6 m (5' 3\").    Weight as of this encounter: 85.9 kg (189 lb 4.8 oz).,     Diet: NPO for Medical/Clinical Reasons Except for: Ice Chips    DVT Prophylaxis: Enoxaparin (Lovenox) SQ  Otero Catheter: Not present  Lines: None     Cardiac Monitoring: None  Code Status: Full Code         Disposition Plan     Medically Ready for Discharge: Anticipated in 2-4 Days           The patient's care was discussed with the Bedside Nurse and Patient.    Debbie Talley, NP-S  [facility]  [facility]  Securely message with [employer] (more info)  Text page " via [facility] Paging/Directory   ______________________________________________________________________    Interval History   Four episodes of vomiting overnight. No diarrhea or gas. Unable to tolerate PO intake. She states the pain and bloating has worsened and feels similar to previous bowel obstructions.     Physical Exam   Vital Signs: Temp: 97.8  F (36.6  C) Temp src: Oral BP: (!) 162/79 Pulse: 105   Resp: 16 SpO2: 94 % O2 Device: Nasal cannula Oxygen Delivery: 2 LPM  Weight: 189 lbs 4.8 oz    GENERAL: Comfortable.  PSYCH: Pleasant, oriented. NAD.  HEENT:  Atraumatic, normocephalic. Normal conjunctiva, normal hearing, and oropharynx is normal.  NECK:  Supple, no neck vein distention.  HEART:  Normal S1, S2 with no murmur, no pericardial rub, gallops or S3 or S4.  LUNGS: Decreased bases, otherwise clear to auscultation. Shallow breathing. No wheezing, rales or ronchi.  GI: Distended and firm upper quadrants. Tender to palpation. Hypoactive bowel sounds.   EXTREMITIES: Moves all 4 extremities independently. No cyanosis or edema.  SKIN:  Dry to touch, No rash, wound or ulcerations noted to visualized areas.  NEUROLOGIC: Grossly normal. Sensation is intact with no focal deficits.      Medical Decision Making       65 MINUTES SPENT BY ME on the date of service doing chart review, history, exam, documentation & further activities per the note.      Data     I have personally reviewed the following data over the past 24 hrs:    9.3  \   10.5 (L)   / 302     129 (L) 94 (L) 14.8 /  134 (H)   4.3 24 0.70 \       Imaging results reviewed over the past 24 hrs:   Recent Results (from the past 24 hour(s))   XR Chest Port 1 View    Narrative    EXAM: XR CHEST PORT 1 VIEW  LOCATION: [facility]  DATE: 6/26/2024    INDICATION: hypoxia  COMPARISON: CT abdomen 6/23/2024.      Impression    IMPRESSION: Low lung volumes and bibasilar atelectasis. No pleural fluid or pneumothorax. Normal heart size and pulmonary  vascularity. Plate and screw fixation of the cervical spine. Bilateral breast implants.   XR Abdomen 1 View    Narrative    ABDOMEN ONE VIEW   6/26/2024 11:17 AM     HISTORY: Ileus vs small bowel obstruction.    COMPARISON: CT 6/23/2024.      Impression    IMPRESSION: Multiple dilated proximal to mid small bowel loops appear  increased in the interval. Decompressed colon. Findings may represent  bowel obstruction. No free air identified. Lumbosacral fusion.

## 2024-06-26 NOTE — PROGRESS NOTES
A&Ox4. NG tube in place to intermit low suction. 200mL output. Reports throat pain from tube. Stiches removed per surgical center. SBA. LR @ 100ml/hr. Abd distended and tender. 1L NC. NPO, Ice chips.

## 2024-06-26 NOTE — PLAN OF CARE
"Inpt note 7-15    Pt had ng tube place with lis and good output. Pt symptoms are improving. Pt is npo at this time. Pt a/o x 4 with stand by assist to bathroom. IVF at 100ml/hr. Cont with plan of care.  Problem: Adult Inpatient Plan of Care  Goal: Plan of Care Review  Description: The Plan of Care Review/Shift note should be completed every shift.  The Outcome Evaluation is a brief statement about your assessment that the patient is improving, declining, or no change.  This information will be displayed automatically on your shift  note.  Outcome: Progressing  Flowsheets (Taken 6/26/2024 1505)  Outcome Evaluation: Pt had ng tube place with brown liquid coming out, stomach softening and less distended.  Goal: Patient-Specific Goal (Individualized)  Description: You can add care plan individualizations to a care plan. Examples of Individualization might be:  \"Parent requests to be called daily at 9am for status\", \"I have a hard time hearing out of my right ear\", or \"Do not touch me to wake me up as it startles  me\".  Outcome: Progressing  Goal: Absence of Hospital-Acquired Illness or Injury  Outcome: Progressing  Intervention: Identify and Manage Fall Risk  Recent Flowsheet Documentation  Taken 6/26/2024 1115 by Nai Nielson RN  Safety Promotion/Fall Prevention:   increased rounding and observation   clutter free environment maintained   safety round/check completed   room near nurse's station   room door open   nonskid shoes/slippers when out of bed   mobility aid in reach  Intervention: Prevent Skin Injury  Recent Flowsheet Documentation  Taken 6/26/2024 1115 by Nai Nielson RN  Body Position: position changed independently  Intervention: Prevent Infection  Recent Flowsheet Documentation  Taken 6/26/2024 1115 by Nai Nielson RN  Infection Prevention:   single patient room provided   rest/sleep promoted   hand hygiene promoted  Goal: Optimal Comfort and Wellbeing  Outcome: " Progressing  Intervention: Monitor Pain and Promote Comfort  Recent Flowsheet Documentation  Taken 6/26/2024 1048 by Nai Nielosn RN  Pain Management Interventions: medication (see MAR)  Taken 6/26/2024 0915 by Nai Nielson RN  Pain Management Interventions: medication (see MAR)  Goal: Readiness for Transition of Care  Outcome: Progressing     Problem: Bowel Disease, Inflammatory (Ulcerative Colitis or Crohn's Disease)  Goal: Optimal Adaptation to Chronic Illness  Outcome: Progressing  Goal: Absence of Infection Signs and Symptoms  Outcome: Progressing  Goal: Optimal Nutrition Delivery  Outcome: Progressing  Goal: Optimal Pain Control and Function  Outcome: Progressing  Intervention: Prevent or Manage Pain  Recent Flowsheet Documentation  Taken 6/26/2024 1048 by Nai Nielson RN  Pain Management Interventions: medication (see MAR)  Taken 6/26/2024 0915 by Nai Nielson RN  Pain Management Interventions: medication (see MAR)     Problem: Comorbidity Management  Goal: Blood Glucose Levels Within Targeted Range  Outcome: Progressing  Intervention: Monitor and Manage Glycemia  Recent Flowsheet Documentation  Taken 6/26/2024 1115 by Nai Nielson RN  Medication Review/Management: medications reviewed   Goal Outcome Evaluation:                 Outcome Evaluation: Pt had ng tube place with brown liquid coming out, stomach softening and less distended.

## 2024-06-26 NOTE — PLAN OF CARE
"[id number]    Inpatient Progress Note:  A & O X 4. Patient reported shortness of breath with activity.  Desat at 86-88% on RA and C/O dizziness when lying in bed.  Patient on 2 LPM supplemental oxygen X.Cover Provider updated. Obtained orders for CXR-results pending.Admitting DX: Acute enterocolitis. C/O nausea, prn Zofran given X 1. On scheduled Tylenol for pain. C/O pain rating pain at 8 out of 10. Prn Dilaudid given X 1.   /71 (BP Location: Left arm)   Pulse 104   Temp 98.9  F (37.2  C) (Oral)   Resp 16   Ht 1.6 m (5' 3\")   Wt 85.9 kg (189 lb 4.8 oz)   SpO2 92%   BMI 33.53 kg/m       Will continue to provide supportive cares.     Cade Murdock, NUNO     Problem: Adult Inpatient Plan of Care  Goal: Plan of Care Review  Description: The Plan of Care Review/Shift note should be completed every shift.  The Outcome Evaluation is a brief statement about your assessment that the patient is improving, declining, or no change.  This information will be displayed automatically on your shift  note.  Outcome: Progressing  Flowsheets (Taken 6/26/2024 0653)  Plan of Care Reviewed With: patient  Goal: Patient-Specific Goal (Individualized)  Description: You can add care plan individualizations to a care plan. Examples of Individualization might be:  \"Parent requests to be called daily at 9am for status\", \"I have a hard time hearing out of my right ear\", or \"Do not touch me to wake me up as it startles  me\".  Outcome: Progressing  Goal: Absence of Hospital-Acquired Illness or Injury  Outcome: Progressing  Intervention: Identify and Manage Fall Risk  Recent Flowsheet Documentation  Taken 6/26/2024 0032 by Cade Murdock, RN  Safety Promotion/Fall Prevention:   increased rounding and observation   activity supervised   supervised activity   safety round/check completed   room organization consistent   nonskid shoes/slippers when out of bed   lighting adjusted  Intervention: Prevent Skin Injury  Recent Flowsheet " Documentation  Taken 6/26/2024 0032 by Cade Murdock RN  Body Position: position changed independently  Intervention: Prevent and Manage VTE (Venous Thromboembolism) Risk  Recent Flowsheet Documentation  Taken 6/26/2024 0032 by Cade Murdock RN  VTE Prevention/Management: SCDs (sequential compression devices) off  Intervention: Prevent Infection  Recent Flowsheet Documentation  Taken 6/26/2024 0032 by Cade Murdock RN  Infection Prevention: rest/sleep promoted  Goal: Optimal Comfort and Wellbeing  Outcome: Progressing  Intervention: Monitor Pain and Promote Comfort  Recent Flowsheet Documentation  Taken 6/26/2024 0133 by Cade Murdock RN  Pain Management Interventions: medication (see MAR)  Goal: Readiness for Transition of Care  Outcome: Progressing     Problem: Bowel Disease, Inflammatory (Ulcerative Colitis or Crohn's Disease)  Goal: Optimal Adaptation to Chronic Illness  Outcome: Progressing  Goal: Absence of Infection Signs and Symptoms  Outcome: Progressing  Goal: Optimal Nutrition Delivery  Outcome: Progressing  Goal: Optimal Pain Control and Function  Outcome: Progressing  Intervention: Prevent or Manage Pain  Recent Flowsheet Documentation  Taken 6/26/2024 0133 by Cade Murdock RN  Pain Management Interventions: medication (see MAR)     Problem: Comorbidity Management  Goal: Blood Glucose Levels Within Targeted Range  Outcome: Progressing  Intervention: Monitor and Manage Glycemia  Recent Flowsheet Documentation  Taken 6/26/2024 0032 by Cade Murdock RN  Medication Review/Management: medications reviewed       Plan of Care Reviewed With: patient

## 2024-06-26 NOTE — PLAN OF CARE
"Care from [id number]    Inpatient Progress Note: Suspected Ileus  For complete assessment see flow sheet documentation.    BP (!) 146/66 (BP Location: Left arm)   Pulse 102   Temp 98.5  F (36.9  C) (Oral)   Resp 20   Ht 1.6 m (5' 3\")   Wt 85.1 kg (187 lb 11.2 oz)   SpO2 93%   BMI 33.25 kg/m         Orientation: Alert and oriented x4  Neuro: intact  Pain status: abd pain, managed with scheduled Tylenol  Activity: ambulateed in the hallway with SBA 1 with gait belt  Resp: WATSON but recovers quickly. On room air. Denies cough  Cardiac: VSS, elevated BP noted.  GI: With nausea, constipated. Last BM 6/22. Bowel sounds hypoactive x4. Abd is distended. Was given PRN Maalox  :  Voiding without difficulty  Skin: Steri-strips intact on both eyelids from recent blepharoplasty for droopy eyelids   LDA: PIV  Infusions: SL  Discharge Plan: TBD    Goal Outcome Evaluation:      Plan of Care Reviewed With: patient    Overall Patient Progress: improvingOverall Patient Progress: improving    Outcome Evaluation: C?O WATSON with ambulation. abd pain, unable to pas gas. Last BM was Sat 6/22/24. Maalox given.      "

## 2024-06-26 NOTE — PROVIDER NOTIFICATION
Paged X-Cover Provider regarding patient sats at 86, 88 on RA, had her on 2 LPM supplemental Oxygen now sats 92-94%. C/O dizziness while lying in bed. Patient has no oxygen orders.  Obtained  CXR,  and oxygen orders

## 2024-06-27 ENCOUNTER — APPOINTMENT (OUTPATIENT)
Dept: GENERAL RADIOLOGY | Facility: CLINIC | Age: 74
DRG: 329 | End: 2024-06-27
Attending: INTERNAL MEDICINE
Payer: MEDICARE

## 2024-06-27 ENCOUNTER — APPOINTMENT (OUTPATIENT)
Dept: GENERAL RADIOLOGY | Facility: CLINIC | Age: 74
DRG: 329 | End: 2024-06-27
Payer: MEDICARE

## 2024-06-27 LAB
ANION GAP SERPL CALCULATED.3IONS-SCNC: 13 MMOL/L (ref 7–15)
BUN SERPL-MCNC: 15.5 MG/DL (ref 8–23)
CALCIUM SERPL-MCNC: 8.1 MG/DL (ref 8.8–10.2)
CHLORIDE SERPL-SCNC: 92 MMOL/L (ref 98–107)
CREAT SERPL-MCNC: 0.69 MG/DL (ref 0.51–0.95)
DEPRECATED HCO3 PLAS-SCNC: 24 MMOL/L (ref 22–29)
EGFRCR SERPLBLD CKD-EPI 2021: >90 ML/MIN/1.73M2
ERYTHROCYTE [DISTWIDTH] IN BLOOD BY AUTOMATED COUNT: 13.1 % (ref 10–15)
GLUCOSE SERPL-MCNC: 125 MG/DL (ref 70–99)
HCT VFR BLD AUTO: 31 % (ref 35–47)
HGB BLD-MCNC: 10.2 G/DL (ref 11.7–15.7)
MCH RBC QN AUTO: 29.5 PG (ref 26.5–33)
MCHC RBC AUTO-ENTMCNC: 32.9 G/DL (ref 31.5–36.5)
MCV RBC AUTO: 90 FL (ref 78–100)
PLATELET # BLD AUTO: 301 10E3/UL (ref 150–450)
POTASSIUM SERPL-SCNC: 4.5 MMOL/L (ref 3.4–5.3)
RBC # BLD AUTO: 3.46 10E6/UL (ref 3.8–5.2)
SODIUM SERPL-SCNC: 129 MMOL/L (ref 135–145)
WBC # BLD AUTO: 8.3 10E3/UL (ref 4–11)

## 2024-06-27 PROCEDURE — 250N000011 HC RX IP 250 OP 636: Mod: JZ | Performed by: INTERNAL MEDICINE

## 2024-06-27 PROCEDURE — 999N000065 XR ABDOMEN PORT 1 VIEW

## 2024-06-27 PROCEDURE — 36415 COLL VENOUS BLD VENIPUNCTURE: CPT | Performed by: PHYSICIAN ASSISTANT

## 2024-06-27 PROCEDURE — 258N000003 HC RX IP 258 OP 636: Performed by: PHYSICIAN ASSISTANT

## 2024-06-27 PROCEDURE — 85027 COMPLETE CBC AUTOMATED: CPT | Performed by: PHYSICIAN ASSISTANT

## 2024-06-27 PROCEDURE — 120N000001 HC R&B MED SURG/OB

## 2024-06-27 PROCEDURE — 250N000011 HC RX IP 250 OP 636: Performed by: PHYSICIAN ASSISTANT

## 2024-06-27 PROCEDURE — 250N000009 HC RX 250: Performed by: PHYSICIAN ASSISTANT

## 2024-06-27 PROCEDURE — 74018 RADEX ABDOMEN 1 VIEW: CPT

## 2024-06-27 PROCEDURE — 99233 SBSQ HOSP IP/OBS HIGH 50: CPT | Performed by: PHYSICIAN ASSISTANT

## 2024-06-27 PROCEDURE — 99418 PROLNG IP/OBS E/M EA 15 MIN: CPT | Performed by: PHYSICIAN ASSISTANT

## 2024-06-27 PROCEDURE — 250N000013 HC RX MED GY IP 250 OP 250 PS 637: Performed by: PHYSICIAN ASSISTANT

## 2024-06-27 PROCEDURE — 82374 ASSAY BLOOD CARBON DIOXIDE: CPT | Performed by: PHYSICIAN ASSISTANT

## 2024-06-27 PROCEDURE — 250N000009 HC RX 250: Performed by: INTERNAL MEDICINE

## 2024-06-27 RX ORDER — LIDOCAINE HYDROCHLORIDE 20 MG/ML
JELLY TOPICAL ONCE
Status: COMPLETED | OUTPATIENT
Start: 2024-06-27 | End: 2024-06-27

## 2024-06-27 RX ORDER — METOCLOPRAMIDE HYDROCHLORIDE 5 MG/ML
10 INJECTION INTRAMUSCULAR; INTRAVENOUS ONCE
Status: COMPLETED | OUTPATIENT
Start: 2024-06-27 | End: 2024-06-27

## 2024-06-27 RX ADMIN — ACETAMINOPHEN 1000 MG: 500 TABLET, FILM COATED ORAL at 01:15

## 2024-06-27 RX ADMIN — ACETAMINOPHEN 1000 MG: 500 TABLET, FILM COATED ORAL at 07:58

## 2024-06-27 RX ADMIN — METOCLOPRAMIDE 10 MG: 5 INJECTION, SOLUTION INTRAMUSCULAR; INTRAVENOUS at 06:16

## 2024-06-27 RX ADMIN — HYDROMORPHONE HYDROCHLORIDE 2 MG: 2 TABLET ORAL at 01:15

## 2024-06-27 RX ADMIN — ONDANSETRON 4 MG: 4 TABLET, ORALLY DISINTEGRATING ORAL at 23:43

## 2024-06-27 RX ADMIN — Medication 1 CAPSULE: at 07:58

## 2024-06-27 RX ADMIN — ACETAMINOPHEN 1000 MG: 500 TABLET, FILM COATED ORAL at 13:42

## 2024-06-27 RX ADMIN — HYDROMORPHONE HYDROCHLORIDE 0.4 MG: 0.2 INJECTION, SOLUTION INTRAMUSCULAR; INTRAVENOUS; SUBCUTANEOUS at 18:01

## 2024-06-27 RX ADMIN — SODIUM CHLORIDE, POTASSIUM CHLORIDE, SODIUM LACTATE AND CALCIUM CHLORIDE: 600; 310; 30; 20 INJECTION, SOLUTION INTRAVENOUS at 17:40

## 2024-06-27 RX ADMIN — ONDANSETRON 4 MG: 2 INJECTION INTRAMUSCULAR; INTRAVENOUS at 18:17

## 2024-06-27 RX ADMIN — DICYCLOMINE HYDROCHLORIDE 10 MG: 10 CAPSULE ORAL at 13:43

## 2024-06-27 RX ADMIN — LIDOCAINE HYDROCHLORIDE: 20 JELLY TOPICAL at 06:16

## 2024-06-27 RX ADMIN — SERTRALINE 100 MG: 100 TABLET, FILM COATED ORAL at 07:58

## 2024-06-27 RX ADMIN — TOPICAL ANESTHETIC 1 ML: 200 SPRAY DENTAL; PERIODONTAL at 19:12

## 2024-06-27 RX ADMIN — GABAPENTIN 900 MG: 300 CAPSULE ORAL at 13:43

## 2024-06-27 RX ADMIN — ENOXAPARIN SODIUM 40 MG: 40 INJECTION SUBCUTANEOUS at 17:41

## 2024-06-27 RX ADMIN — DICYCLOMINE HYDROCHLORIDE 10 MG: 10 CAPSULE ORAL at 07:58

## 2024-06-27 RX ADMIN — GABAPENTIN 900 MG: 300 CAPSULE ORAL at 07:58

## 2024-06-27 ASSESSMENT — ACTIVITIES OF DAILY LIVING (ADL)
ADLS_ACUITY_SCORE: 26
ADLS_ACUITY_SCORE: 26
ADLS_ACUITY_SCORE: 25
ADLS_ACUITY_SCORE: 25
ADLS_ACUITY_SCORE: 26
ADLS_ACUITY_SCORE: 25
ADLS_ACUITY_SCORE: 26
ADLS_ACUITY_SCORE: 25
ADLS_ACUITY_SCORE: 26
ADLS_ACUITY_SCORE: 25
ADLS_ACUITY_SCORE: 26
ADLS_ACUITY_SCORE: 25
ADLS_ACUITY_SCORE: 25
ADLS_ACUITY_SCORE: 26
ADLS_ACUITY_SCORE: 26
ADLS_ACUITY_SCORE: 25

## 2024-06-27 NOTE — PLAN OF CARE
Goal Outcome Evaluation:    0412hrs: Patient  pulled her NG tube out.Reports it was accidental. Cross cover updated      Plan of Care Reviewed With: patient    Overall Patient Progress: improvingOverall Patient Progress: improving

## 2024-06-27 NOTE — PLAN OF CARE
"Goal Outcome Evaluation:  Care from [id number]    Inpatient Progress Note:  For complete assessment see flow sheet documentation.    BP (!) 147/82 (BP Location: Left arm)   Pulse 105   Temp 99.7  F (37.6  C) (Oral)   Resp 24   Ht 1.6 m (5' 3\")   Wt 85.9 kg (189 lb 4.8 oz)   SpO2 93%   BMI 33.53 kg/m     Alert & oriented x 4.VSS. SBA with mobility.On 1L 02 to keep sats above 90.Has remained NPO  for bowel rest.Abdomen remains distended and tender.LR infusing@100 ml/hr. Attempts x 2 to insert a new NG failed. Reinsertion will be attempted again. POC ongoing.        Plan of Care Reviewed With: patient    Problem: Adult Inpatient Plan of Care  Goal: Plan of Care Review  Description: The Plan of Care Review/Shift note should be completed every shift.  The Outcome Evaluation is a brief statement about your assessment that the patient is improving, declining, or no change.  This information will be displayed automatically on your shift  note.  Outcome: Progressing  Flowsheets (Taken 6/26/2024 2205)  Plan of Care Reviewed With: patient  Overall Patient Progress: improving  Goal: Patient-Specific Goal (Individualized)  Description: You can add care plan individualizations to a care plan. Examples of Individualization might be:  \"Parent requests to be called daily at 9am for status\", \"I have a hard time hearing out of my right ear\", or \"Do not touch me to wake me up as it startles  me\".  Outcome: Progressing  Goal: Absence of Hospital-Acquired Illness or Injury  Outcome: Progressing  Goal: Optimal Comfort and Wellbeing  Outcome: Progressing  Goal: Readiness for Transition of Care  Outcome: Progressing  Flowsheets (Taken 6/26/2024 2205)  Transportation Anticipated: family or friend will provide  Concerns to be Addressed: discharge planning  Intervention: Mutually Develop Transition Plan  Recent Flowsheet Documentation  Taken 6/26/2024 2205 by Emma Durán RN  Transportation Anticipated: family or friend will " provide  Concerns to be Addressed: discharge planning       Overall Patient Progress: improvingOverall Patient Progress: improving

## 2024-06-27 NOTE — PROGRESS NOTES
"I was present with the student who participated in the service and in the documentation of the note. I have verified the history and personally performed the physical exam and medical decision-making. I agree with the assessment and plan of care as documented in the note. Elly Gutierrez PA-C     [facility]    Medicine Progress Note - Hospitalist Service    Date of Admission:  6/23/2024    Assessment & Plan   Yoselin Zelaya is a 73 year old female with a PMH significant for remote history of gastric bypass, TIA, iron deficiency anemia, and history of subarachnoid hemorrhage due to fall, who was admitted on 6/23/2024 with acute enterocolitis possibly due to food borne illness vs viral etiology, now appears to have ileus vs SBO.     #Acute enterocolitis - possible food borne illness vs viral etiology   #Acute ileus vs small bowel obstruction - improving  Acute episode of abdominal pain, nausea, vomiting and diarrhea about 24-hour after eating out at a restaurant. Her son-in-law had similar symptoms but his quickly resolved.   Labs are reassuring, normal lactic acid on admission; repeat lactic normal, lipase slightly elevated, likely due to vomiting. WBC elevated to 11.4 on admission, up to 13.7-->13.8--> 9.3-->8.3 today.   No bowel movement or diarrhea since admission, therefore, enteric stool panel not indicated.   - 06/25/24 Overall improved, but worsened throughout the day and evening, with abdominal pain, nausea & \"green bile\" emesis x 4 overnight and in am. Bowel sounds hypoactive and abdominal distension is more pronounced, per pt. She was unable to tolerate PO intake and denied passing gas. Encouraged bowel rest.   - 06/26/24 Attempted bowel rest alone without improvement in pain or distension. Also notes shortness of breath due to distension. Pt was requiring oxygen during day and night.   - ABD XR obtained, showed multiple dilated proximal to mid bowel loops consistent with " suspected ileus vs SBO. No free air.   - NG tube placed to LIS on 06/26/24 around 1400. NGT had >900 mL of brown/dark output over eight hours and patient endorsed improvement in distension and pain.  - 06/27/24 around 0400, pt awoke and accidentally pulled her NG tube out. Attempts to reinsert failed x2.   - pt reports improvement in abd pain, distension and nausea. Bowel sounds are hypoactive but present.   - Will continue to leave NGT out, pending clinical course. Low-threshold for reinsertion given pt's hx significant for multiple SBOs and large output.  - ok for clear liquids, pt instructed to take it easy on liquids and report any increase in sx  - Continue multimodal analgesics & antiemetics PRN  - Continue IVFs  - Ambulate QID     #Hyperglycemia - stable  Glucose 218 in ED, then 152, 134, and 125 fasting. A1c 5.5. No DM hx.  - Suspect reactive to illness  - Monitor for hypo/hyperglycemia     #Hyponatremia - stable  137 on admission. Subsequently 135, 132, 129, and 129. Likely due to poor PO intake.  - Continue  mL/hr   - Ok for clear liquid diet today.   - Recheck in AM     #NATAN  #Intermittent hypoxia - resolved   No formal obstructive sleep apnea diagnosis. Has been requiring O2 at night during hospitalization, suspect due to NATAN. Pt has intermittently required O2 during the day as well, suspect this is due to hypoventilation due to illness. Pt reports improvement in breathing and shortness of breath this morning.  - Incentive spirometry   - Continuous pulse oximetry  - Oxygen PRN  - Encourage deep breathing and cough exercises     #Recent bilateral blepharoplasty for droopy eyelids  - DOS 06/19/24 w/ plastic surgery  - Denies pre and/or postoperative antibiotics  - Surgical sites CDI x4 open to air. No drainage or swelling.  - Pt missed follow-up appointment yesterday for suture removal, due to admission. Sutures removed 06/26/24 1630.     #Depression  #Anxiety  - Continue PTA Zoloft and propranolol    "  #Neuropathy  - Continue PTA gabapentin      #Incidental finding  - Small pulmonary nodule up to 3 mm right middle lobe, consider further outpt monitoring in 12 mos if high risk  - Pt denies any tobacco use history     #Obesity: Estimated body mass index is 33.53 kg/m  as calculated from the following:    Height as of this encounter: 1.6 m (5' 3\").    Weight as of this encounter: 85.9 kg (189 lb 4.8 oz).,     Diet: Clear liquid diet  DVT Prophylaxis: Enoxaparin (Lovenox) SQ  Otero Catheter: Not present  Lines: None     Cardiac Monitoring: None  Code Status: Full Code      Disposition Plan - pending clinical course    Medically Ready for Discharge: Anticipated in 2-4 Days    The patient's care was discussed with the Bedside Nurse and Patient.    Debbie Talley NP-S  Critical access hospitalist Service  [facility]  Securely message with [employer] (more info)  Text page via [employer] Paging/Directory   ______________________________________________________________________    Interval History   Patient sitting up in chair this morning. NGT was accidentally discontinued overnight around 0500. She has been tolerating ice chips without nausea or vomiting since NG has been out. She reports improving abdominal pain and distension in addition to improvememt in her shortness of breath. She is on RA this morning, which is baseline. She endorses persistent decreased appetite. She reports having one BM last evening that was \"small and pebbly,\" still not passing flatus.    Physical Exam   Vital Signs: Temp: 98.3  F (36.8  C) Temp src: Oral BP: 138/72 Pulse: 103   Resp: 16 SpO2: 93 % O2 Device: Nasal cannula Oxygen Delivery: 1 LPM  Weight: 190 lbs 14.4 oz    GENERAL: Comfortable.  PSYCH: Pleasant, oriented. NAD.  HEENT:  Sutures removed 06/26/24 per surgical center's request. Bilateral surgical sites CDI SUZETTE.   HEART:  Normal S1, S2 with no murmur, no pericardial rub, gallops or S3 or S4.  LUNGS: Decreased " bases, otherwise clear to auscultation. Improvement in depth of respirations. No wheezing, rales or ronchi. On Room air.   GI: Upper quadrant distension mildly improved. Pain is absent when sitting or lying still. Tender to palpation. Hypoactive bowel sounds. Not passing flatus yet.  EXTREMITIES: Moves all 4 extremities independently. No cyanosis or edema.  SKIN:  Dry to touch, No rash, wound or ulcerations noted to visualized areas.  NEUROLOGIC: Grossly normal. Sensation is intact with no focal deficits.     Medical Decision Making       65 MINUTES SPENT BY ME on the date of service doing chart review, history, exam, documentation & further activities per the note.      Data     I have personally reviewed the following data over the past 24 hrs:    8.3  \   10.2 (L)   / 301     129 (L) 92 (L) 15.5 /  125 (H)   4.5 24 0.69 \     Procal: N/A CRP: N/A Lactic Acid: 1.0         Imaging results reviewed over the past 24 hrs:   Recent Results (from the past 24 hour(s))   XR Abdomen 1 View    Narrative    ABDOMEN ONE VIEW   6/26/2024 11:17 AM     HISTORY: Ileus vs small bowel obstruction.    COMPARISON: CT 6/23/2024.      Impression    IMPRESSION: Multiple dilated proximal to mid small bowel loops appear  increased in the interval. Decompressed colon. Findings may represent  bowel obstruction. No free air identified. Lumbosacral fusion.    KAYLA BYRD MD         SYSTEM ID:  [id number]   XR Abdomen Port 1 View    Narrative    ABDOMEN PORTABLE ONE VIEW   6/26/2024 3:02 PM     HISTORY: Abdominal pain. Enterocolitis.    COMPARISON: 6/26/2024.      Impression    IMPRESSION: Nasogastric tube tip within the gastric lumen. Multiple  dilated gas-filled bowel loops again identified.    KAYLA BYRD MD         SYSTEM ID:  [id number]

## 2024-06-27 NOTE — SIGNIFICANT EVENT
Significant Event Note    Time of event: 0420    Description of event:  Informed by nursing that the patient had pulled her NG out    Plan:  Contacted nursing apparently patient has been putting out greater than 900 cc in the last 8 hours through her NG tube, this implies that the NG tube is required at this point in time.  Requested nursing to place the NG tube again at bedside.  Will require expertise because of patient's prior history of gastric bypass.  Follow-up x-ray will be undertaken then resume low intermittent suction    Discussed with: bedside nurse    Cecelia Scott MD

## 2024-06-27 NOTE — PLAN OF CARE
"In pt care: 7- 15  Pt anciently pulled out her ng tube early this morning. After two failed attempts to replace it it was kept out and pt has been doing well with no nausea or vomiting. Pt is tolerating clear liquids and has been ambulating in the halls with stand by.       Problem: Adult Inpatient Plan of Care  Goal: Plan of Care Review  Description: The Plan of Care Review/Shift note should be completed every shift.  The Outcome Evaluation is a brief statement about your assessment that the patient is improving, declining, or no change.  This information will be displayed automatically on your shift  note.  Outcome: Progressing  Flowsheets (Taken 6/27/2024 1422)  Outcome Evaluation: Pt symptoms are improving and is toleratilng clear liquids.  Plan of Care Reviewed With:   patient   spouse  Goal: Patient-Specific Goal (Individualized)  Description: You can add care plan individualizations to a care plan. Examples of Individualization might be:  \"Parent requests to be called daily at 9am for status\", \"I have a hard time hearing out of my right ear\", or \"Do not touch me to wake me up as it startles  me\".  Outcome: Progressing  Goal: Absence of Hospital-Acquired Illness or Injury  Outcome: Progressing  Intervention: Identify and Manage Fall Risk  Recent Flowsheet Documentation  Taken 6/27/2024 1053 by Nai Nielson RN  Safety Promotion/Fall Prevention:   safety round/check completed   room organization consistent   patient and family education   nonskid shoes/slippers when out of bed   lighting adjusted   increased rounding and observation   assistive device/personal items within reach   activity supervised   clutter free environment maintained  Intervention: Prevent Skin Injury  Recent Flowsheet Documentation  Taken 6/27/2024 1053 by Nai Nielson RN  Body Position: position changed independently  Intervention: Prevent and Manage VTE (Venous Thromboembolism) Risk  Recent Flowsheet Documentation  Taken " 6/27/2024 1053 by Nai Nielson RN  VTE Prevention/Management: SCDs (sequential compression devices) off  Intervention: Prevent Infection  Recent Flowsheet Documentation  Taken 6/27/2024 1053 by Nai Nielson RN  Infection Prevention: single patient room provided  Goal: Optimal Comfort and Wellbeing  Outcome: Progressing  Goal: Readiness for Transition of Care  Outcome: Progressing     Problem: Bowel Disease, Inflammatory (Ulcerative Colitis or Crohn's Disease)  Goal: Optimal Adaptation to Chronic Illness  Outcome: Progressing  Goal: Absence of Infection Signs and Symptoms  Outcome: Progressing  Goal: Optimal Nutrition Delivery  Outcome: Progressing  Goal: Optimal Pain Control and Function  Outcome: Progressing     Problem: Comorbidity Management  Goal: Blood Glucose Levels Within Targeted Range  Outcome: Progressing  Intervention: Monitor and Manage Glycemia  Recent Flowsheet Documentation  Taken 6/27/2024 1053 by Nai Nielson RN  Medication Review/Management: medications reviewed   Goal Outcome Evaluation:      Plan of Care Reviewed With: patient, spouse          Outcome Evaluation: Pt symptoms are improving and is toleratilng clear liquids.

## 2024-06-28 ENCOUNTER — ANESTHESIA (OUTPATIENT)
Dept: SURGERY | Facility: CLINIC | Age: 74
DRG: 329 | End: 2024-06-28
Payer: MEDICARE

## 2024-06-28 ENCOUNTER — APPOINTMENT (OUTPATIENT)
Dept: SURGERY | Facility: PHYSICIAN GROUP | Age: 74
End: 2024-06-28
Payer: MEDICARE

## 2024-06-28 ENCOUNTER — ANESTHESIA EVENT (OUTPATIENT)
Dept: SURGERY | Facility: CLINIC | Age: 74
DRG: 329 | End: 2024-06-28
Payer: MEDICARE

## 2024-06-28 ENCOUNTER — APPOINTMENT (OUTPATIENT)
Dept: CT IMAGING | Facility: CLINIC | Age: 74
DRG: 329 | End: 2024-06-28
Attending: STUDENT IN AN ORGANIZED HEALTH CARE EDUCATION/TRAINING PROGRAM
Payer: MEDICARE

## 2024-06-28 LAB
ANION GAP SERPL CALCULATED.3IONS-SCNC: 12 MMOL/L (ref 7–15)
BUN SERPL-MCNC: 11.6 MG/DL (ref 8–23)
CALCIUM SERPL-MCNC: 8 MG/DL (ref 8.8–10.2)
CHLORIDE SERPL-SCNC: 89 MMOL/L (ref 98–107)
CREAT SERPL-MCNC: 0.65 MG/DL (ref 0.51–0.95)
DEPRECATED HCO3 PLAS-SCNC: 24 MMOL/L (ref 22–29)
EGFRCR SERPLBLD CKD-EPI 2021: >90 ML/MIN/1.73M2
ERYTHROCYTE [DISTWIDTH] IN BLOOD BY AUTOMATED COUNT: 13 % (ref 10–15)
GLUCOSE BLDC GLUCOMTR-MCNC: 120 MG/DL (ref 70–99)
GLUCOSE BLDC GLUCOMTR-MCNC: 125 MG/DL (ref 70–99)
GLUCOSE SERPL-MCNC: 105 MG/DL (ref 70–99)
HCT VFR BLD AUTO: 30.4 % (ref 35–47)
HGB BLD-MCNC: 10.1 G/DL (ref 11.7–15.7)
MCH RBC QN AUTO: 29.8 PG (ref 26.5–33)
MCHC RBC AUTO-ENTMCNC: 33.2 G/DL (ref 31.5–36.5)
MCV RBC AUTO: 90 FL (ref 78–100)
PLATELET # BLD AUTO: 321 10E3/UL (ref 150–450)
POTASSIUM SERPL-SCNC: 3.7 MMOL/L (ref 3.4–5.3)
RBC # BLD AUTO: 3.39 10E6/UL (ref 3.8–5.2)
SODIUM SERPL-SCNC: 125 MMOL/L (ref 135–145)
WBC # BLD AUTO: 11 10E3/UL (ref 4–11)

## 2024-06-28 PROCEDURE — 258N000003 HC RX IP 258 OP 636: Performed by: ANESTHESIOLOGY

## 2024-06-28 PROCEDURE — 250N000009 HC RX 250: Performed by: NURSE ANESTHETIST, CERTIFIED REGISTERED

## 2024-06-28 PROCEDURE — 250N000011 HC RX IP 250 OP 636: Performed by: STUDENT IN AN ORGANIZED HEALTH CARE EDUCATION/TRAINING PROGRAM

## 2024-06-28 PROCEDURE — 88307 TISSUE EXAM BY PATHOLOGIST: CPT | Mod: 26 | Performed by: PATHOLOGY

## 2024-06-28 PROCEDURE — 250N000013 HC RX MED GY IP 250 OP 250 PS 637: Performed by: STUDENT IN AN ORGANIZED HEALTH CARE EDUCATION/TRAINING PROGRAM

## 2024-06-28 PROCEDURE — 87076 CULTURE ANAEROBE IDENT EACH: CPT | Performed by: STUDENT IN AN ORGANIZED HEALTH CARE EDUCATION/TRAINING PROGRAM

## 2024-06-28 PROCEDURE — 710N000009 HC RECOVERY PHASE 1, LEVEL 1, PER MIN: Performed by: STUDENT IN AN ORGANIZED HEALTH CARE EDUCATION/TRAINING PROGRAM

## 2024-06-28 PROCEDURE — 360N000077 HC SURGERY LEVEL 4, PER MIN: Performed by: STUDENT IN AN ORGANIZED HEALTH CARE EDUCATION/TRAINING PROGRAM

## 2024-06-28 PROCEDURE — 0WJG4ZZ INSPECTION OF PERITONEAL CAVITY, PERCUTANEOUS ENDOSCOPIC APPROACH: ICD-10-PCS | Performed by: STUDENT IN AN ORGANIZED HEALTH CARE EDUCATION/TRAINING PROGRAM

## 2024-06-28 PROCEDURE — 370N000017 HC ANESTHESIA TECHNICAL FEE, PER MIN: Performed by: STUDENT IN AN ORGANIZED HEALTH CARE EDUCATION/TRAINING PROGRAM

## 2024-06-28 PROCEDURE — 87186 SC STD MICRODIL/AGAR DIL: CPT | Performed by: STUDENT IN AN ORGANIZED HEALTH CARE EDUCATION/TRAINING PROGRAM

## 2024-06-28 PROCEDURE — 250N000011 HC RX IP 250 OP 636: Performed by: PHYSICIAN ASSISTANT

## 2024-06-28 PROCEDURE — 36415 COLL VENOUS BLD VENIPUNCTURE: CPT | Performed by: PHYSICIAN ASSISTANT

## 2024-06-28 PROCEDURE — 258N000003 HC RX IP 258 OP 636: Performed by: PHYSICIAN ASSISTANT

## 2024-06-28 PROCEDURE — 0JNC0ZZ RELEASE PELVIC REGION SUBCUTANEOUS TISSUE AND FASCIA, OPEN APPROACH: ICD-10-PCS | Performed by: STUDENT IN AN ORGANIZED HEALTH CARE EDUCATION/TRAINING PROGRAM

## 2024-06-28 PROCEDURE — 250N000011 HC RX IP 250 OP 636: Performed by: INTERNAL MEDICINE

## 2024-06-28 PROCEDURE — 258N000003 HC RX IP 258 OP 636: Performed by: STUDENT IN AN ORGANIZED HEALTH CARE EDUCATION/TRAINING PROGRAM

## 2024-06-28 PROCEDURE — 85027 COMPLETE CBC AUTOMATED: CPT | Performed by: PHYSICIAN ASSISTANT

## 2024-06-28 PROCEDURE — 80048 BASIC METABOLIC PNL TOTAL CA: CPT | Performed by: PHYSICIAN ASSISTANT

## 2024-06-28 PROCEDURE — 250N000009 HC RX 250: Performed by: STUDENT IN AN ORGANIZED HEALTH CARE EDUCATION/TRAINING PROGRAM

## 2024-06-28 PROCEDURE — 99418 PROLNG IP/OBS E/M EA 15 MIN: CPT | Performed by: PHYSICIAN ASSISTANT

## 2024-06-28 PROCEDURE — 44120 REMOVAL OF SMALL INTESTINE: CPT | Performed by: STUDENT IN AN ORGANIZED HEALTH CARE EDUCATION/TRAINING PROGRAM

## 2024-06-28 PROCEDURE — 88307 TISSUE EXAM BY PATHOLOGIST: CPT | Mod: TC | Performed by: STUDENT IN AN ORGANIZED HEALTH CARE EDUCATION/TRAINING PROGRAM

## 2024-06-28 PROCEDURE — 99222 1ST HOSP IP/OBS MODERATE 55: CPT | Mod: 57 | Performed by: STUDENT IN AN ORGANIZED HEALTH CARE EDUCATION/TRAINING PROGRAM

## 2024-06-28 PROCEDURE — 258N000003 HC RX IP 258 OP 636: Performed by: NURSE ANESTHETIST, CERTIFIED REGISTERED

## 2024-06-28 PROCEDURE — 250N000025 HC SEVOFLURANE, PER MIN: Performed by: STUDENT IN AN ORGANIZED HEALTH CARE EDUCATION/TRAINING PROGRAM

## 2024-06-28 PROCEDURE — 250N000011 HC RX IP 250 OP 636: Performed by: ANESTHESIOLOGY

## 2024-06-28 PROCEDURE — 250N000009 HC RX 250: Performed by: ANESTHESIOLOGY

## 2024-06-28 PROCEDURE — 120N000001 HC R&B MED SURG/OB

## 2024-06-28 PROCEDURE — 44120 REMOVAL OF SMALL INTESTINE: CPT | Mod: AS | Performed by: PHYSICIAN ASSISTANT

## 2024-06-28 PROCEDURE — 0DB80ZZ EXCISION OF SMALL INTESTINE, OPEN APPROACH: ICD-10-PCS | Performed by: STUDENT IN AN ORGANIZED HEALTH CARE EDUCATION/TRAINING PROGRAM

## 2024-06-28 PROCEDURE — 250N000011 HC RX IP 250 OP 636: Performed by: NURSE ANESTHETIST, CERTIFIED REGISTERED

## 2024-06-28 PROCEDURE — 272N000001 HC OR GENERAL SUPPLY STERILE: Performed by: STUDENT IN AN ORGANIZED HEALTH CARE EDUCATION/TRAINING PROGRAM

## 2024-06-28 PROCEDURE — 999N000141 HC STATISTIC PRE-PROCEDURE NURSING ASSESSMENT: Performed by: STUDENT IN AN ORGANIZED HEALTH CARE EDUCATION/TRAINING PROGRAM

## 2024-06-28 PROCEDURE — G1010 CDSM STANSON: HCPCS

## 2024-06-28 PROCEDURE — 74177 CT ABD & PELVIS W/CONTRAST: CPT | Mod: MG

## 2024-06-28 PROCEDURE — 99233 SBSQ HOSP IP/OBS HIGH 50: CPT | Performed by: PHYSICIAN ASSISTANT

## 2024-06-28 RX ORDER — ONDANSETRON 2 MG/ML
INJECTION INTRAMUSCULAR; INTRAVENOUS PRN
Status: DISCONTINUED | OUTPATIENT
Start: 2024-06-28 | End: 2024-06-28

## 2024-06-28 RX ORDER — BUPIVACAINE HYDROCHLORIDE AND EPINEPHRINE 2.5; 5 MG/ML; UG/ML
INJECTION, SOLUTION EPIDURAL; INFILTRATION; INTRACAUDAL; PERINEURAL PRN
Status: DISCONTINUED | OUTPATIENT
Start: 2024-06-28 | End: 2024-06-28 | Stop reason: HOSPADM

## 2024-06-28 RX ORDER — PIPERACILLIN SODIUM, TAZOBACTAM SODIUM 3; .375 G/15ML; G/15ML
3.38 INJECTION, POWDER, LYOPHILIZED, FOR SOLUTION INTRAVENOUS ONCE
Status: COMPLETED | OUTPATIENT
Start: 2024-06-28 | End: 2024-06-28

## 2024-06-28 RX ORDER — LIDOCAINE HYDROCHLORIDE 20 MG/ML
INJECTION, SOLUTION INFILTRATION; PERINEURAL PRN
Status: DISCONTINUED | OUTPATIENT
Start: 2024-06-28 | End: 2024-06-28

## 2024-06-28 RX ORDER — HYDRALAZINE HYDROCHLORIDE 20 MG/ML
2.5-5 INJECTION INTRAMUSCULAR; INTRAVENOUS EVERY 10 MIN PRN
Status: DISCONTINUED | OUTPATIENT
Start: 2024-06-28 | End: 2024-06-28 | Stop reason: HOSPADM

## 2024-06-28 RX ORDER — CEFAZOLIN SODIUM/WATER 2 G/20 ML
2 SYRINGE (ML) INTRAVENOUS
Status: COMPLETED | OUTPATIENT
Start: 2024-06-28 | End: 2024-06-28

## 2024-06-28 RX ORDER — FENTANYL CITRATE 50 UG/ML
INJECTION, SOLUTION INTRAMUSCULAR; INTRAVENOUS PRN
Status: DISCONTINUED | OUTPATIENT
Start: 2024-06-28 | End: 2024-06-28

## 2024-06-28 RX ORDER — FENTANYL CITRATE 50 UG/ML
25 INJECTION, SOLUTION INTRAMUSCULAR; INTRAVENOUS EVERY 5 MIN PRN
Status: DISCONTINUED | OUTPATIENT
Start: 2024-06-28 | End: 2024-06-28 | Stop reason: HOSPADM

## 2024-06-28 RX ORDER — FENTANYL CITRATE 50 UG/ML
50 INJECTION, SOLUTION INTRAMUSCULAR; INTRAVENOUS EVERY 5 MIN PRN
Status: DISCONTINUED | OUTPATIENT
Start: 2024-06-28 | End: 2024-06-28 | Stop reason: HOSPADM

## 2024-06-28 RX ORDER — ONDANSETRON 4 MG/1
4 TABLET, ORALLY DISINTEGRATING ORAL EVERY 30 MIN PRN
Status: DISCONTINUED | OUTPATIENT
Start: 2024-06-28 | End: 2024-06-28 | Stop reason: HOSPADM

## 2024-06-28 RX ORDER — PIPERACILLIN SODIUM, TAZOBACTAM SODIUM 3; .375 G/15ML; G/15ML
3.38 INJECTION, POWDER, LYOPHILIZED, FOR SOLUTION INTRAVENOUS EVERY 6 HOURS
Status: DISCONTINUED | OUTPATIENT
Start: 2024-06-28 | End: 2024-07-02

## 2024-06-28 RX ORDER — SODIUM CHLORIDE, SODIUM LACTATE, POTASSIUM CHLORIDE, CALCIUM CHLORIDE 600; 310; 30; 20 MG/100ML; MG/100ML; MG/100ML; MG/100ML
INJECTION, SOLUTION INTRAVENOUS CONTINUOUS
Status: DISCONTINUED | OUTPATIENT
Start: 2024-06-28 | End: 2024-06-28 | Stop reason: HOSPADM

## 2024-06-28 RX ORDER — IOPAMIDOL 755 MG/ML
98 INJECTION, SOLUTION INTRAVASCULAR ONCE
Status: COMPLETED | OUTPATIENT
Start: 2024-06-28 | End: 2024-06-28

## 2024-06-28 RX ORDER — HYDROMORPHONE HCL IN WATER/PF 6 MG/30 ML
0.4 PATIENT CONTROLLED ANALGESIA SYRINGE INTRAVENOUS EVERY 5 MIN PRN
Status: DISCONTINUED | OUTPATIENT
Start: 2024-06-28 | End: 2024-06-28 | Stop reason: HOSPADM

## 2024-06-28 RX ORDER — DEXAMETHASONE SODIUM PHOSPHATE 4 MG/ML
INJECTION, SOLUTION INTRA-ARTICULAR; INTRALESIONAL; INTRAMUSCULAR; INTRAVENOUS; SOFT TISSUE PRN
Status: DISCONTINUED | OUTPATIENT
Start: 2024-06-28 | End: 2024-06-28

## 2024-06-28 RX ORDER — ONDANSETRON 2 MG/ML
4 INJECTION INTRAMUSCULAR; INTRAVENOUS EVERY 30 MIN PRN
Status: DISCONTINUED | OUTPATIENT
Start: 2024-06-28 | End: 2024-06-28 | Stop reason: HOSPADM

## 2024-06-28 RX ORDER — SODIUM CHLORIDE 9 MG/ML
INJECTION, SOLUTION INTRAVENOUS CONTINUOUS
Status: DISCONTINUED | OUTPATIENT
Start: 2024-06-28 | End: 2024-07-02

## 2024-06-28 RX ORDER — HYDROMORPHONE HCL IN WATER/PF 6 MG/30 ML
0.2 PATIENT CONTROLLED ANALGESIA SYRINGE INTRAVENOUS EVERY 5 MIN PRN
Status: DISCONTINUED | OUTPATIENT
Start: 2024-06-28 | End: 2024-06-28 | Stop reason: HOSPADM

## 2024-06-28 RX ORDER — DEXAMETHASONE SODIUM PHOSPHATE 4 MG/ML
4 INJECTION, SOLUTION INTRA-ARTICULAR; INTRALESIONAL; INTRAMUSCULAR; INTRAVENOUS; SOFT TISSUE
Status: DISCONTINUED | OUTPATIENT
Start: 2024-06-28 | End: 2024-06-28 | Stop reason: HOSPADM

## 2024-06-28 RX ORDER — PROPOFOL 10 MG/ML
INJECTION, EMULSION INTRAVENOUS PRN
Status: DISCONTINUED | OUTPATIENT
Start: 2024-06-28 | End: 2024-06-28

## 2024-06-28 RX ORDER — ALBUTEROL SULFATE 0.83 MG/ML
2.5 SOLUTION RESPIRATORY (INHALATION) EVERY 4 HOURS PRN
Status: DISCONTINUED | OUTPATIENT
Start: 2024-06-28 | End: 2024-06-28 | Stop reason: HOSPADM

## 2024-06-28 RX ORDER — CEFAZOLIN SODIUM/WATER 2 G/20 ML
2 SYRINGE (ML) INTRAVENOUS SEE ADMIN INSTRUCTIONS
Status: DISCONTINUED | OUTPATIENT
Start: 2024-06-28 | End: 2024-06-29

## 2024-06-28 RX ORDER — LIDOCAINE 40 MG/G
CREAM TOPICAL
Status: DISCONTINUED | OUTPATIENT
Start: 2024-06-28 | End: 2024-07-06 | Stop reason: HOSPADM

## 2024-06-28 RX ORDER — NALOXONE HYDROCHLORIDE 0.4 MG/ML
0.1 INJECTION, SOLUTION INTRAMUSCULAR; INTRAVENOUS; SUBCUTANEOUS
Status: DISCONTINUED | OUTPATIENT
Start: 2024-06-28 | End: 2024-06-28 | Stop reason: HOSPADM

## 2024-06-28 RX ORDER — LABETALOL HYDROCHLORIDE 5 MG/ML
10 INJECTION, SOLUTION INTRAVENOUS
Status: DISCONTINUED | OUTPATIENT
Start: 2024-06-28 | End: 2024-06-28 | Stop reason: HOSPADM

## 2024-06-28 RX ORDER — PROPOFOL 10 MG/ML
INJECTION, EMULSION INTRAVENOUS CONTINUOUS PRN
Status: DISCONTINUED | OUTPATIENT
Start: 2024-06-28 | End: 2024-06-28

## 2024-06-28 RX ADMIN — SODIUM CHLORIDE: 9 INJECTION, SOLUTION INTRAVENOUS at 12:30

## 2024-06-28 RX ADMIN — ALBUTEROL SULFATE 2.5 MG: 2.5 SOLUTION RESPIRATORY (INHALATION) at 16:03

## 2024-06-28 RX ADMIN — ROCURONIUM BROMIDE 20 MG: 50 INJECTION, SOLUTION INTRAVENOUS at 13:42

## 2024-06-28 RX ADMIN — SODIUM CHLORIDE: 9 INJECTION, SOLUTION INTRAVENOUS at 13:58

## 2024-06-28 RX ADMIN — PROPOFOL 150 MG: 10 INJECTION, EMULSION INTRAVENOUS at 12:48

## 2024-06-28 RX ADMIN — PHENYLEPHRINE HYDROCHLORIDE 200 MCG: 10 INJECTION INTRAVENOUS at 12:48

## 2024-06-28 RX ADMIN — PROCHLORPERAZINE EDISYLATE 5 MG: 5 INJECTION INTRAMUSCULAR; INTRAVENOUS at 00:53

## 2024-06-28 RX ADMIN — SODIUM CHLORIDE, POTASSIUM CHLORIDE, SODIUM LACTATE AND CALCIUM CHLORIDE: 600; 310; 30; 20 INJECTION, SOLUTION INTRAVENOUS at 04:30

## 2024-06-28 RX ADMIN — DEXAMETHASONE SODIUM PHOSPHATE 4 MG: 4 INJECTION, SOLUTION INTRA-ARTICULAR; INTRALESIONAL; INTRAMUSCULAR; INTRAVENOUS; SOFT TISSUE at 12:46

## 2024-06-28 RX ADMIN — FENTANYL CITRATE 25 MCG: 50 INJECTION, SOLUTION INTRAMUSCULAR; INTRAVENOUS at 16:29

## 2024-06-28 RX ADMIN — PROPOFOL 75 MCG/KG/MIN: 10 INJECTION, EMULSION INTRAVENOUS at 13:10

## 2024-06-28 RX ADMIN — Medication 100 MG: at 12:48

## 2024-06-28 RX ADMIN — HYDROMORPHONE HYDROCHLORIDE 0.5 MG: 1 INJECTION, SOLUTION INTRAMUSCULAR; INTRAVENOUS; SUBCUTANEOUS at 13:44

## 2024-06-28 RX ADMIN — ROCURONIUM BROMIDE 10 MG: 50 INJECTION, SOLUTION INTRAVENOUS at 13:20

## 2024-06-28 RX ADMIN — FENTANYL CITRATE 100 MCG: 50 INJECTION INTRAMUSCULAR; INTRAVENOUS at 12:46

## 2024-06-28 RX ADMIN — LIDOCAINE HYDROCHLORIDE 50 MG: 20 INJECTION, SOLUTION INFILTRATION; PERINEURAL at 12:48

## 2024-06-28 RX ADMIN — SODIUM CHLORIDE 100 ML: 900 INJECTION INTRAVENOUS at 11:14

## 2024-06-28 RX ADMIN — ROCURONIUM BROMIDE 20 MG: 50 INJECTION, SOLUTION INTRAVENOUS at 13:03

## 2024-06-28 RX ADMIN — PHENYLEPHRINE HYDROCHLORIDE 100 MCG: 10 INJECTION INTRAVENOUS at 14:00

## 2024-06-28 RX ADMIN — SUGAMMADEX 200 MG: 100 INJECTION, SOLUTION INTRAVENOUS at 15:00

## 2024-06-28 RX ADMIN — DICYCLOMINE HYDROCHLORIDE 10 MG: 10 CAPSULE ORAL at 20:09

## 2024-06-28 RX ADMIN — GABAPENTIN 900 MG: 300 CAPSULE ORAL at 20:09

## 2024-06-28 RX ADMIN — ACETAMINOPHEN 1000 MG: 500 TABLET, FILM COATED ORAL at 20:09

## 2024-06-28 RX ADMIN — ONDANSETRON 4 MG: 2 INJECTION INTRAMUSCULAR; INTRAVENOUS at 13:15

## 2024-06-28 RX ADMIN — PROPOFOL 50 MG: 10 INJECTION, EMULSION INTRAVENOUS at 13:28

## 2024-06-28 RX ADMIN — PIPERACILLIN AND TAZOBACTAM 3.38 G: 3; .375 INJECTION, POWDER, FOR SOLUTION INTRAVENOUS at 14:37

## 2024-06-28 RX ADMIN — IOPAMIDOL 98 ML: 755 INJECTION, SOLUTION INTRAVENOUS at 10:05

## 2024-06-28 RX ADMIN — Medication 2 G: at 12:40

## 2024-06-28 RX ADMIN — PIPERACILLIN AND TAZOBACTAM 3.38 G: 3; .375 INJECTION, POWDER, FOR SOLUTION INTRAVENOUS at 20:15

## 2024-06-28 RX ADMIN — HYDROMORPHONE HYDROCHLORIDE 0.5 MG: 1 INJECTION, SOLUTION INTRAMUSCULAR; INTRAVENOUS; SUBCUTANEOUS at 13:17

## 2024-06-28 RX ADMIN — ROCURONIUM BROMIDE 20 MG: 50 INJECTION, SOLUTION INTRAVENOUS at 13:28

## 2024-06-28 RX ADMIN — SODIUM CHLORIDE: 9 INJECTION, SOLUTION INTRAVENOUS at 19:44

## 2024-06-28 RX ADMIN — HYDROMORPHONE HYDROCHLORIDE 0.2 MG: 0.2 INJECTION, SOLUTION INTRAMUSCULAR; INTRAVENOUS; SUBCUTANEOUS at 22:12

## 2024-06-28 RX ADMIN — ROCURONIUM BROMIDE 20 MG: 50 INJECTION, SOLUTION INTRAVENOUS at 13:51

## 2024-06-28 RX ADMIN — ONDANSETRON 4 MG: 2 INJECTION INTRAMUSCULAR; INTRAVENOUS at 11:09

## 2024-06-28 ASSESSMENT — ACTIVITIES OF DAILY LIVING (ADL)
ADLS_ACUITY_SCORE: 25
ADLS_ACUITY_SCORE: 26
ADLS_ACUITY_SCORE: 25

## 2024-06-28 NOTE — CONSULTS
General Surgery Consultation    Yoselin Zelaya MRN# [id number]   Age: 73 year old YOB: 1950     Date of Admission:  6/23/2024    Reason for consult:            Abdominal pain, enterocolitis        Requesting physician:            Anita Ley PA-C                Assessment and Plan:   Assessment:   #abdominal pain  #enterocolitis   #history of RYGB     I have reviewed her CT scan from 6/23 which shows a inflamed segment of bowel in the pelvis. Her recent abdominal XR's show persistently dilated bowel. She continues with abdominal pain today and is moderately tender in the lower abdomen on exam.     I recommend repeating a CT scan with contrast today and then we will determine next steps.     Addendum:     I have reviewed her CT which is consistent with a SBO with a fair amount of mesenteric edema and fluid in the pelvis. She continues to feel miserable. I have recommended surgical exploration today. I will plan to start laparoscopically but may have to open depending on intraoperative conditions. A bowel resection might also be necessary. I explained other risks of surgery including bleeding, infection, injury to adjacent structures or organs, and risks of anesthesia. She understood and agreed to proceed.             Chief Complaint:   Abdominal pain    History is obtained from the patient         History of Present Illness:   Yoselin Zelaya is a 73 year old female with a history of RYGB who is admitted to the hospital with abdominal pain. She presented to the ED 5 days ago with pain. A CT scan at that time showed an inflamed segment of bowel in the pelvis but no transition point concerning for enterocolitis. This is concern for possible food poisoning vs a viral etiology. She also had diarrhea and vomiting. However, she is no longer having diarrhea and is passing gas but no significant bowel movements. She also feels nauseated and has vomited. An NGT has been placed with improvement in  symptoms. Today, she still feels sore in the pelvis and nauseated and her symptoms are not improving.            Past Medical History:   TIA   Iron deficiency anemia           Past Surgical History:   RYGB   Appendectomy   Cholecystectomy   Hysterectomy           Social History:     Social History     Tobacco Use    Smoking status: Not on file    Smokeless tobacco: Not on file   Substance Use Topics    Alcohol use: Not on file             Family History:   No family history on file.         Allergies:     Allergies   Allergen Reactions    Latex              Medications:     Current Facility-Administered Medications   Medication Dose Route Frequency Provider Last Rate Last Admin    acetaminophen (TYLENOL) tablet 1,000 mg  1,000 mg Oral Q6H Dolan, Sybil Ray PA-C   1,000 mg at 06/27/24 1342    alum & mag hydroxide-simethicone (MAALOX) suspension 30 mL  30 mL Oral Q4H PRN Elly Gutierrez PA-C   30 mL at 06/25/24 2014    benzocaine 20% (HURRICAINE/TOPEX) 20 % spray 0.5-1 mL  1-2 spray Mouth/Throat Q3H PRN Elly Gutierrez PA-C   1 mL at 06/27/24 1912    dicyclomine (BENTYL) capsule 10 mg  10 mg Oral 4x Daily Elly Gutierrez PA-C   10 mg at 06/27/24 1343    enoxaparin ANTICOAGULANT (LOVENOX) injection 40 mg  40 mg Subcutaneous Q24H Elly Gutierrez PA-C   40 mg at 06/27/24 1741    gabapentin (NEURONTIN) capsule 900 mg  900 mg Oral TID Dolan, Sybil Ray PA-C   900 mg at 06/27/24 1343    HYDROmorphone (DILAUDID) half-tab 1 mg  1 mg Oral Q4H PRN Dolan, Sybil Ray PA-C   1 mg at 06/23/24 2128    HYDROmorphone (DILAUDID) injection 0.2 mg  0.2 mg Intravenous Q2H PRN Kelsi, Sybil Ray PA-C   0.2 mg at 06/25/24 1739    HYDROmorphone (DILAUDID) injection 0.4 mg  0.4 mg Intravenous Q2H PRN Dolan, Sybil Ray PA-C   0.4 mg at 06/27/24 1801    HYDROmorphone (DILAUDID) tablet 2 mg  2 mg Oral Q4H PRN Dolan, Sybil Ray PA-C   2 mg at 06/27/24 0115    lactobacillus rhamnosus (GG) (CULTURELL) capsule 1  capsule  1 capsule Oral BID Dolan, Sybil Ray PA-C   1 capsule at 06/27/24 0758    naloxone (NARCAN) injection 0.2 mg  0.2 mg Intravenous Q2 Min PRN Kelvin Garcia, DO        Or    naloxone (NARCAN) injection 0.4 mg  0.4 mg Intravenous Q2 Min PRN Kelvin Garcia, DO        Or    naloxone (NARCAN) injection 0.2 mg  0.2 mg Intramuscular Q2 Min PRN Kelvin Garcia, DO        Or    naloxone (NARCAN) injection 0.4 mg  0.4 mg Intramuscular Q2 Min PRN Kelvin Garcia D, DO        ondansetron (ZOFRAN ODT) ODT tab 4 mg  4 mg Oral Q6H PRN Dolan, Sybil Ray PA-C   4 mg at 06/27/24 2343    Or    ondansetron (ZOFRAN) injection 4 mg  4 mg Intravenous Q6H PRN Dolan, Sybil Ray PA-C   4 mg at 06/27/24 1817    prochlorperazine (COMPAZINE) injection 5 mg  5 mg Intravenous Q6H PRN Dolan, Sybil Ray PA-C   5 mg at 06/28/24 0053    Or    prochlorperazine (COMPAZINE) tablet 5 mg  5 mg Oral Q6H PRN Dolan, Sybil Ray PA-C   5 mg at 06/23/24 2128    Or    prochlorperazine (COMPAZINE) suppository 12.5 mg  12.5 mg Rectal Q12H PRN DolanSybil PA-C        propranolol (INDERAL) tablet 20 mg  20 mg Oral TID PRN Dolan, Sybil Ray PA-C   20 mg at 06/23/24 2036    sertraline (ZOLOFT) tablet 100 mg  100 mg Oral QAM Dolan, Sybil Ray PA-C   100 mg at 06/27/24 0758    sodium chloride 0.9% BOLUS 100 mL  100 mL Intravenous Once Elly Gutierrez PA-C         Current Facility-Administered Medications   Medication Dose Route Frequency Provider Last Rate Last Admin    acetaminophen (TYLENOL) tablet 1,000 mg  1,000 mg Oral Q6H Dolan, Sybil Ray PA-C   1,000 mg at 06/27/24 1342    dicyclomine (BENTYL) capsule 10 mg  10 mg Oral 4x Daily Elly Gutierrez PA-C   10 mg at 06/27/24 1343    enoxaparin ANTICOAGULANT (LOVENOX) injection 40 mg  40 mg Subcutaneous Q24H Elly Gutierrez PA-C   40 mg at 06/27/24 1741    gabapentin (NEURONTIN) capsule 900 mg  900 mg Oral TID Sybil Dolan PA-C   900 mg at 06/27/24  "1343    lactobacillus rhamnosus (GG) (CULTURELL) capsule 1 capsule  1 capsule Oral BID Sybil Dolan PA-C   1 capsule at 06/27/24 0758    sertraline (ZOLOFT) tablet 100 mg  100 mg Oral QAM Sybil Dolan PA-C   100 mg at 06/27/24 0758    sodium chloride 0.9% BOLUS 100 mL  100 mL Intravenous Once Elly Gutierrez PA-C                Review of Systems:   The 10 point review of systems is negative other than noted in the HPI.          Physical Exam:   BP (!) 169/85 (BP Location: Right arm)   Pulse 101   Temp 99.5  F (37.5  C) (Oral)   Resp 16   Ht 1.6 m (5' 3\")   Wt 88 kg (193 lb 14.4 oz)   SpO2 93%   BMI 34.35 kg/m    General - not in acute distress, appears somewhat tired, lying in bed   Lungs: normal effort on RA   Heart: tachycardiac   Abdomen: soft, mildly distended, moderately tender to palpation in the lower abdomen   Neurologic: nonfocal  Psychiatric: Mood and affect appropriate  Skin: Without lesions, rashes, or jaundice         Data:   Labs reviewed    Imaging:  All imaging studies reviewed by me and my interpretation of the CT is: inflamed bowel in the pelvis, history of RYGB      Enoch Duarte MD  6/28/2024 9:52 AM              "

## 2024-06-28 NOTE — ANESTHESIA PREPROCEDURE EVALUATION
Anesthesia Pre-Procedure Evaluation    Patient: Yoselin Zelaya   MRN: [id number] : 1950        Procedure : Procedure(s):  DIAGNOSTIC LAPAROSCOPY, POSSIBLE LAPAROTOMY, POSSIBLE BOWEL RESECTION          No past medical history on file.   No past surgical history on file.   Allergies   Allergen Reactions    Latex       Social History     Tobacco Use    Smoking status: Not on file    Smokeless tobacco: Not on file   Substance Use Topics    Alcohol use: Not on file      Wt Readings from Last 1 Encounters:   24 88 kg (193 lb 14.4 oz)        Anesthesia Evaluation            ROS/MED HX  ENT/Pulmonary:     (+)     NATAN risk factors (intermittent hypoxia requring supplemental O2 on this admission),                                   Neurologic:       Cardiovascular:  - neg cardiovascular ROS     METS/Exercise Tolerance:     Hematologic:     (+)      anemia,          Musculoskeletal:       GI/Hepatic: Comment: SBO, NGT in place    History of multiple abdominal surgeries including patrice n y gastric bypass        Renal/Genitourinary: Comment: Hyponatremia (137 on admission 5 days ago, downtrending to 125 today), hypochloremia  Likely 2/2 GI losses  Asymptomatic      Endo:     (+)               Obesity,       Psychiatric/Substance Use:       Infectious Disease:       Malignancy:       Other:            Physical Exam    Airway        Mallampati: II   TM distance: > 3 FB   Neck ROM: full   Mouth opening: > 3 cm    Respiratory Devices and Support         Dental           Cardiovascular   cardiovascular exam normal          Pulmonary   pulmonary exam normal                OUTSIDE LABS:  CBC:   Lab Results   Component Value Date    WBC 11.0 2024    WBC 8.3 2024    HGB 10.1 (L) 2024    HGB 10.2 (L) 2024    HCT 30.4 (L) 2024    HCT 31.0 (L) 2024     2024     2024     BMP:   Lab Results   Component Value Date     (L) 2024     (L) 2024     "POTASSIUM 3.7 06/28/2024    POTASSIUM 4.5 06/27/2024    CHLORIDE 89 (L) 06/28/2024    CHLORIDE 92 (L) 06/27/2024    CO2 24 06/28/2024    CO2 24 06/27/2024    BUN 11.6 06/28/2024    BUN 15.5 06/27/2024    CR 0.65 06/28/2024    CR 0.69 06/27/2024     (H) 06/28/2024     (H) 06/27/2024     COAGS: No results found for: \"PTT\", \"INR\", \"FIBR\"  POC: No results found for: \"BGM\", \"HCG\", \"HCGS\"  HEPATIC:   Lab Results   Component Value Date    ALBUMIN 4.1 06/23/2024    PROTTOTAL 6.7 06/23/2024    ALT 13 06/23/2024    AST 21 06/23/2024    ALKPHOS 63 06/23/2024    BILITOTAL 0.2 06/23/2024     OTHER:   Lab Results   Component Value Date    LACT 1.0 06/26/2024    A1C 5.5 06/24/2024    ERICK 8.0 (L) 06/28/2024    LIPASE 65 (H) 06/23/2024       Anesthesia Plan    ASA Status:  3, emergent    NPO Status:  ELEVATED Aspiration Risk/Unknown    Anesthesia Type: General.     - Airway: ETT   Induction: Intravenous, RSI.   Maintenance: Balanced.        Consents    Anesthesia Plan(s) and associated risks, benefits, and realistic alternatives discussed. Questions answered and patient/representative(s) expressed understanding.     - Discussed:     - Discussed with:  Patient            Postoperative Care    Pain management: IV analgesics, Oral pain medications, Multi-modal analgesia.   PONV prophylaxis: Ondansetron (or other 5HT-3), Dexamethasone or Solumedrol     Comments:    Other Comments: Hypovolemic hyponatremia slowly downtrending since admission 5 days ago. Asymptomatic. No acute intervention. Will use NS intra-op with goal of restoring some intravascular volume and not reducing Na further.            Radha Billings MD    I have reviewed the pertinent notes and labs in the chart from the past 30 days and (re)examined the patient.  Any updates or changes from those notes are reflected in this note.             "

## 2024-06-28 NOTE — PROVIDER NOTIFICATION
Notified ATTILA Banerjee pt has expiratory wheezes on 12L simple face mask. ATTILA Banerjee gave verbal order to administer 2.5mg albuterol nebulizer to patient in PACU.    Shira Mariscal RN on 6/28/2024 at 4:11 PM

## 2024-06-28 NOTE — ANESTHESIA CARE TRANSFER NOTE
Patient: Yoselin Zelaya    Procedure: Procedure(s):  DIAGNOSTIC LAPAROSCOPY, laparatomy, lysis of adhesion, Small Bowel Resection       Diagnosis: Small bowel obstruction (H) [K56.609]  Diagnosis Additional Information: No value filed.    Anesthesia Type:   General     Note:    Oropharynx: oropharynx clear of all foreign objects and spontaneously breathing  Level of Consciousness: drowsy  Oxygen Supplementation: face mask  Level of Supplemental Oxygen (L/min / FiO2): 10  Independent Airway: airway patency satisfactory and stable  Dentition: dentition unchanged  Vital Signs Stable: post-procedure vital signs reviewed and stable  Report to RN Given: handoff report given  Patient transferred to: PACU  Comments: Lung recruitment with patient following direction and Sats to 92%, had been 88%  Handoff Report: Identifed the Patient, Identified the Reponsible Provider, Reviewed the pertinent medical history, Discussed the surgical course, Reviewed Intra-OP anesthesia mangement and issues during anesthesia, Set expectations for post-procedure period and Allowed opportunity for questions and acknowledgement of understanding      Vitals:  Vitals Value Taken Time   BP     Temp     Pulse 98 06/28/24 1521   Resp 16 06/28/24 1521   SpO2 92 % 06/28/24 1521   Vitals shown include unfiled device data.    Electronically Signed By: Dean Dennis Severson, APRN CRNA  June 28, 2024  3:23 PM

## 2024-06-28 NOTE — BRIEF OP NOTE
[facility]    Brief Operative Note    Pre-operative diagnosis: Small bowel obstruction (H) [K56.609]  Post-operative diagnosis Same as pre-operative diagnosis    Procedure: DIAGNOSTIC LAPAROSCOPY, laparatomy, lysis of adhesion, Small Bowel Resection, N/A - Abdomen    Surgeon: Surgeons and Role:     * Enoch Duarte MD - Primary     * Nir Woodard PA-C  Anesthesia: General   Estimated Blood Loss: 20 ml     Drains: None  Specimens:   ID Type Source Tests Collected by Time Destination   1 : Small Intestine Tissue Small Intestine SURGICAL PATHOLOGY EXAM Enoch Duarte MD 6/28/2024  2:23 PM    A : Peritoneal Fluid Peritoneal Fluid Peritoneum ANAEROBIC BACTERIAL CULTURE ROUTINE, AEROBIC BACTERIAL CULTURE ROUTINE Enoch Duarte MD 6/28/2024  2:13 PM      Findings:   Adhesive band in the pelvis causing a complete bowel obstruction with a few small areas of full thickness ischemia and focal perforation with spillage of enteric contents which was contained by the surrounding bowel and mesentery .  Complications: None.  Implants: * No implants in log *    -continue NG to LIS   -Abx - 4 day course of broad spectrum Abx   -Otero - can remove POD#1

## 2024-06-28 NOTE — ANESTHESIA POSTPROCEDURE EVALUATION
Patient: Yoselin Zelaya    Procedure: Procedure(s):  DIAGNOSTIC LAPAROSCOPY, laparatomy, lysis of adhesion, Small Bowel Resection       Anesthesia Type:  General    Note:  Disposition: Inpatient   Postop Pain Control:    PONV: No   Neuro/Psych: Uneventful            Sign Out: Acceptable/Baseline neuro status   Airway/Respiratory: Uneventful            Sign Out: O2 supplementation (Pt O2 requirements increased from pre-op, suspect some post-op atelectasis. Pt sats increase with deep breathing. O2 requirements decreasing over PACU stay and suspect will continue to improve)               Oxygen: Face mask   CV/Hemodynamics: Uneventful            Sign Out: Acceptable CV status; No obvious hypovolemia; No obvious fluid overload   Other NRE:    DID A NON-ROUTINE EVENT OCCUR? No           Last vitals:  Vitals Value Taken Time   /66 06/28/24 1715   Temp     Pulse 100 06/28/24 1716   Resp 12 06/28/24 1716   SpO2 92 % 06/28/24 1716   Vitals shown include unfiled device data.    Electronically Signed By: Anita Banerjee MD  June 28, 2024  5:17 PM

## 2024-06-28 NOTE — ANESTHESIA PROCEDURE NOTES
Airway       Patient location during procedure: OR       Procedure Start/Stop Times: 6/28/2024 12:50 PM  Staff -        CRNA: Radha Dobson APRN CRNA       Performed By: CRNAIndications and Patient Condition       Indications for airway management: lorena-procedural       Induction type:RSI       Mask difficulty assessment: 0 - not attempted    Final Airway Details       Final airway type: endotracheal airway       Successful airway: ETT - single  Endotracheal Airway Details        ETT size (mm): 7.0       Cuffed: yes       Cuff volume (mL): 7       Successful intubation technique: video laryngoscopy       VL Blade Size: Glidescope 3       Grade View of Cords: 1       Position: Center       Measured from: lips       Secured at (cm): 21       Bite block used: None    Post intubation assessment        Placement verified by: capnometry, equal breath sounds and chest rise        Number of attempts at approach: 1       Number of other approaches attempted: 0       Secured with: tape       Ease of procedure: easy       Dentition: Intact and Unchanged    Medication(s) Administered   Medication Administration Time: 6/28/2024 12:50 PM

## 2024-06-28 NOTE — PLAN OF CARE
"PRIMARY DIAGNOSIS: NAUSEA/VOMITING  OUTPATIENT/OBSERVATION GOALS TO BE MET BEFORE DISCHARGE:  1. Pain Status: Pain free.    2. Return to near baseline physical activity: No    3. Cleared for discharge by consultants (if involved): No    Discharge Planner Nurse   Safe discharge environment identified: No  Barriers to discharge: Yes       Entered by: Cristel Jamison RN 06/28/2024 1:23 PM     VSS, afeb. C/O intermittent nausea.NGT in place and patent. in room with pt.Pt. went down for CT of abdomen and shortly after return to floor, surgery in to see pt.and it was decided that pt.is to go to surgery for exp. Lap. Pt. Received 4mg Zofran IV x , and went to restroom prior to being taken to PACU.Pt. will be transferred to 5th floor after surgery.Pt. belongings will be packed up and delivered upstairs.  Problem: Adult Inpatient Plan of Care  Goal: Plan of Care Review  Description: The Plan of Care Review/Shift note should be completed every shift.  The Outcome Evaluation is a brief statement about your assessment that the patient is improving, declining, or no change.  This information will be displayed automatically on your shift  note.  Outcome: Progressing  Flowsheets (Taken 6/28/2024 1000)  Plan of Care Reviewed With:   patient   spouse  Overall Patient Progress: no change  Goal: Patient-Specific Goal (Individualized)  Description: You can add care plan individualizations to a care plan. Examples of Individualization might be:  \"Parent requests to be called daily at 9am for status\", \"I have a hard time hearing out of my right ear\", or \"Do not touch me to wake me up as it startles  me\".  Outcome: Progressing  Goal: Absence of Hospital-Acquired Illness or Injury  Outcome: Progressing  Goal: Optimal Comfort and Wellbeing  Outcome: Not Progressing  Goal: Readiness for Transition of Care  Outcome: Not Progressing     Problem: Bowel Disease, Inflammatory (Ulcerative Colitis or Crohn's Disease)  Goal: Optimal " Adaptation to Chronic Illness  Outcome: Not Progressing  Goal: Absence of Infection Signs and Symptoms  Outcome: Not Progressing  Goal: Optimal Nutrition Delivery  Outcome: Not Progressing  Goal: Optimal Pain Control and Function  Outcome: Not Progressing     Problem: Comorbidity Management  Goal: Blood Glucose Levels Within Targeted Range  Outcome: Not Progressing     Please review provider order for any additional goals.   Nurse to notify provider when observation goals have been met and patient is ready for discharge.Goal Outcome Evaluation:      Plan of Care Reviewed With: patient, spouse    Overall Patient Progress: no changeOverall Patient Progress: no change

## 2024-06-28 NOTE — PLAN OF CARE
Temp: 98.9  F (37.2  C) Temp src: Oral BP: (!) 161/82 Pulse: 99   Resp: 20 SpO2: 94 % O2 Device: Nasal cannula Oxygen Delivery: 1 LPM     A&Ox4. Up SBA-Ax1 with IV pole, pt is steady on her feet, some dizziness experienced after IV dilaudid/IV zofran. Per pt she started to experience at 1pm today 6/27 the same feelings she was experiencing with needing to place prior NG tube- feeling clammy/abdominal pressure/tenderness/pain/mild nausea but no vomiting. Vital signs taken and pt was sating in mid 80s on RA- pt did appear sob and was sob with activity. 1L nasal cannula applied and pt sustaining in low to mid 90s. IS encouraged. Wheezy when getting up oob. Writer ambulated in hallway with pt, burping a lot, but not passing gas, very hypoactive bowel sounds, last bm 6/22. Her abdominal pain increased after trying her clear liquid tray. Provider notified, abdominal xray ordered- no improvement with sbo, new ng placed per provider order, a second abdominal xray verified placement, very little output when first hooked up to LIS, 100mLs, green. Prn IV dilaudid given for abdominal pain x1, mildly nauseous- no vomiting, prn zofran given x2. Hurricaine spray administered prior to ng tube placement. Pt tolerated ng tube placement well. Pain much better since ng placed and has not needed pain/nausea medication. Plan- IVF, pain/nausea control, ng tube to LIS, daily weight, encourage ambulation, encourage IS, npo.      Problem: Adult Inpatient Plan of Care  Goal: Plan of Care Review  Description: The Plan of Care Review/Shift note should be completed every shift.  The Outcome Evaluation is a brief statement about your assessment that the patient is improving, declining, or no change.  This information will be displayed automatically on your shift  note.  Outcome: Progressing  Goal: Patient-Specific Goal (Individualized)  Description: You can add care plan individualizations to a care plan. Examples of Individualization might be:   "\"Parent requests to be called daily at 9am for status\", \"I have a hard time hearing out of my right ear\", or \"Do not touch me to wake me up as it startles  me\".  Outcome: Progressing  Goal: Absence of Hospital-Acquired Illness or Injury  Outcome: Progressing  Intervention: Identify and Manage Fall Risk  Recent Flowsheet Documentation  Taken 6/27/2024 1706 by Shi Mcmillan RN  Safety Promotion/Fall Prevention:   safety round/check completed   nonskid shoes/slippers when out of bed  Intervention: Prevent Skin Injury  Recent Flowsheet Documentation  Taken 6/27/2024 1706 by Shi Mcmillan RN  Body Position: position changed independently  Goal: Optimal Comfort and Wellbeing  Outcome: Progressing  Intervention: Monitor Pain and Promote Comfort  Recent Flowsheet Documentation  Taken 6/27/2024 1842 by Shi Mcmillan RN  Pain Management Interventions: declines  Taken 6/27/2024 1752 by Shi Mcmillan RN  Pain Management Interventions: medication (see MAR)  Goal: Readiness for Transition of Care  Outcome: Progressing     Problem: Bowel Disease, Inflammatory (Ulcerative Colitis or Crohn's Disease)  Goal: Optimal Adaptation to Chronic Illness  Outcome: Progressing  Goal: Absence of Infection Signs and Symptoms  Outcome: Progressing  Goal: Optimal Nutrition Delivery  Outcome: Progressing  Goal: Optimal Pain Control and Function  Outcome: Progressing  Intervention: Prevent or Manage Pain  Recent Flowsheet Documentation  Taken 6/27/2024 1842 by Shi Mcmillan RN  Pain Management Interventions: declines  Taken 6/27/2024 1752 by Shi Mcmillan RN  Pain Management Interventions: medication (see MAR)                         "

## 2024-06-28 NOTE — PROGRESS NOTES
"[facility]    Medicine Progress Note - Hospitalist Service    Date of Admission:  6/23/2024    Assessment & Plan   Yoselin Zelaya is a 73 year old female with a PMH significant for remote history of gastric bypass, TIA, iron deficiency anemia, and history of subarachnoid hemorrhage due to fall, who was admitted on 6/23/2024 with acute enterocolitis possibly due to food borne illness vs viral etiology, now appears to have ileus vs SBO.     #Acute enterocolitis - possible food borne illness vs viral etiology   #Acute ileus vs small bowel obstruction - improving  Acute episode of abdominal pain, nausea, vomiting and diarrhea about 24-hour after eating out at a restaurant. Her son-in-law had similar symptoms but his quickly resolved.   Labs are reassuring, normal lactic acid on admission; repeat lactic normal, lipase slightly elevated, likely due to vomiting. WBC elevated to 11.4 on admission, now within normal limits  No diarrhea since admission, therefore, enteric stool panel not indicated.   - 06/25/24 Overall improved, but worsened throughout the day and evening, with abdominal pain, nausea & \"green bile\" emesis x 4 overnight and in am. Bowel sounds hypoactive and abdominal distension is more pronounced, per pt. She was unable to tolerate PO intake and denied passing gas. Encouraged bowel rest.   - 06/26/24 Attempted bowel rest alone without improvement in pain or distension. Also notes shortness of breath due to distension. Pt was requiring oxygen during day and night.   - ABD XR obtained, showed multiple dilated proximal to mid bowel loops consistent with suspected ileus vs SBO. No free air.   - NG tube placed to LIS on 06/26/24 around 1400. NGT had >900 mL of brown/dark output over eight hours and patient endorsed improvement in distension and pain.  - 06/27/24 around 0400, pt awoke and accidentally pulled her NG tube out. Attempts to reinsert failed x2.   - pt reported improvement in " abd pain, distension and nausea. Bowel sounds are hypoactive but present so we elected to leave NG tube out and attempt continued bowel rest with small amounts of clear liquids and ambulation.   - sx worsened evening of 6/27, increased distension, abd pain and nausea  ]- NG tube placed again 6/27  - continues to feel unwell this AM, somewhat improved after replacement of NG tube. WBC remains normal  ]- consult General surgery given persistent SBO  - recommended repeat abd CT which did show mechanical bowel obstruction with edema  - general surgery electing to perform exploratory laparotomy today  - NPO  - Continue multimodal analgesics & antiemetics PRN  - Continue IVFs, switch to NS  - Ambulate QID  - will transfer to medical floor following surgery     #Hyponatremia   137 on admission. Subsequently 135, 132, 129, 129, and 125 today. Due to minimal PO intake and IVFs  Does have LE edema on exam. Has complained of intermittent SOB but suspect largely related to SBO and abd distension  - Switch LR to  mL/hr   - NPO for surgery today  - Recheck in AM  - may need diuresis at some point     #NATAN  #Intermittent hypoxia - resolved   No formal obstructive sleep apnea diagnosis. Has been requiring O2 at night during hospitalization, suspect due to NATAN. Pt has intermittently required O2 during the day as well, suspect this is due to hypoventilation due to illness and abd distension. SOB seems to improve when NG tube is in  She has received significant fluids here as well, LE edema noted.   - Incentive spirometry   - Continuous pulse oximetry  - Oxygen PRN  - Encourage deep breathing and cough exercises  - may need gentle diuresis at some point    #Hyperglycemia - stable  Glucose 218 in ED, then 152, 134, and 125 fasting. A1c 5.5. No DM hx.  - Suspect reactive to illness  - Monitor for hypo/hyperglycemia     #Recent bilateral blepharoplasty for droopy eyelids  - DOS 06/19/24 w/ plastic surgery  - Denies pre and/or  "postoperative antibiotics  - Surgical sites CDI x4 open to air. No drainage or swelling.  - Pt missed follow-up appointment for suture removal due to admission. Sutures removed 06/26/24.     #Depression  #Anxiety  - Continue PTA Zoloft and propranolol     #Neuropathy  - Continue PTA gabapentin      #Incidental finding  - Small pulmonary nodule up to 3 mm right middle lobe, consider further outpt monitoring in 12 mos if high risk  - Pt denies any tobacco use history       Diet: NPO for Medical/Clinical Reasons Except for: Ice Chips    DVT Prophylaxis: Enoxaparin (Lovenox) SQ  Otero Catheter: Not present  Lines: None     Cardiac Monitoring: None  Code Status: Full Code      Clinically Significant Risk Factors         # Hyponatremia: Lowest Na = 125 mmol/L in last 2 days, will monitor as appropriate                       # Obesity: Estimated body mass index is 34.35 kg/m  as calculated from the following:    Height as of this encounter: 1.6 m (5' 3\").    Weight as of this encounter: 88 kg (193 lb 14.4 oz)., PRESENT ON ADMISSION            Disposition Plan     Medically Ready for Discharge: Anticipated in 2-4 Days           The patient's care was discussed with the Attending Physician, Dr. Zuniga, Bedside Nurse, Patient, Patient's Family, and surgery Consultant(s).    Elly Gutierrez PA-C  Hospitalist Service  [facility]  Securely message with [employer] (more info)  Text page via [employer] Paging/Directory   ______________________________________________________________________    Interval History   Slightly better NG tube replaced. Notes ongoing abd pain and nausea, feels miserable    Physical Exam   Vital Signs: Temp: 99.5  F (37.5  C) Temp src: Oral BP: (!) 169/85 Pulse: 101   Resp: 16 SpO2: 93 % O2 Device: Nasal cannula Oxygen Delivery: 1 LPM  Weight: 193 lbs 14.4 oz    GENERAL:  Comfortable.  PSYCH: pleasant, oriented, No acute distress.  HEART:  Normal S1, S2 with no murmur, no pericardial rub, " gallops or S3 or S4.  LUNGS:  Clear to auscultation, normal Respiratory effort. No wheezing, rales or ronchi.  GI:  Distended and firm but not hard, hypoactive bowel sounds, high pitch sounds noted. Diffusely tender on palpation.   EXTREMITIES:  1+ LE edema, +2 pulses bilateral and equal.  NEUROLOGIC:  grossly intact    Medical Decision Making       70 MINUTES SPENT BY ME on the date of service doing chart review, history, exam, documentation & further activities per the note.      Data     I have personally reviewed the following data over the past 24 hrs:    11.0  \   10.1 (L)   / 321     125 (L) 89 (L) 11.6 /  105 (H)   3.7 24 0.65 \       Imaging results reviewed over the past 24 hrs:   Recent Results (from the past 24 hour(s))   XR Abdomen 1 View    Narrative    EXAM: XR ABDOMEN 1 VIEW  LOCATION: [facility]  DATE: 6/27/2024    INDICATION: increasing abdominal pain, bloating  COMPARISON: Abdominal x-ray 6/26/2024 and CT abdomen and pelvis 6/23/2024      Impression    IMPRESSION: Numerous dilated loops of air-filled small bowel again seen within the mid and lower abdomen similar to the previous study and concerning for small bowel obstruction. No free air. Lower lumbar spinal fusion. Surgical clips right upper   quadrant.   XR Abdomen Port 1 View    Narrative    EXAM: XR ABDOMEN PORT 1 VIEW  LOCATION: [facility]  DATE: 6/27/2024    INDICATION: Nasogastric tube placement.   COMPARISON: Abdominal radiograph 6/27/2024 6:46 PM.       Impression    IMPRESSION:     Gastric drainage tube is appropriately positioned, with tip and side-port in the expected location of the proximal stomach.    Cholecystectomy clips.    Multiple dilated gas-filled bowel loops within the upper abdomen appear similar.   CT Abdomen Pelvis w Contrast    Narrative    CT ABDOMEN PELVIS WITH CONTRAST 6/28/2024 10:09 AM    CLINICAL HISTORY: Abdominal pain. Worsening nausea, abdominal pain,  question  enterocolitis vs bowel obstruction.    TECHNIQUE: CT scan of the abdomen and pelvis was performed following  injection of IV contrast. Multiplanar reformats were obtained. Dose  reduction techniques were used.  CONTRAST: 98mL Isovue-370    COMPARISON: June 23, 2024    FINDINGS:   LOWER CHEST: Minimal pleural fluid bilaterally. Mild probable  dependent atelectasis vs. less likely infiltrate.    HEPATOBILIARY: No significant mass or bile duct dilatation.  Cholecystectomy.     PANCREAS: No significant mass, duct dilatation, or inflammatory  change.    SPLEEN: Normal size.    ADRENAL GLANDS: No significant nodules.    KIDNEYS/BLADDER: No significant mass, stones, or hydronephrosis.  Low-attenuation subcentimeter renal lesion(s). These are compatible  small benign cysts and no specific imaging evaluation or follow-up is  recommended.    BOWEL: Diffuse small bowel dilatation with a transition in the deep  pelvis compatible with mechanical small bowel obstruction. Mesenteric  edema and fluid are present, ischemia is not excluded. Gastric bypass  change.    PELVIC ORGANS: No pelvic masses.    ADDITIONAL FINDINGS: No free intraperitoneal air or abscess. There are  minimal atherosclerotic changes of the visualized aorta and its  branches. There is no evidence of aortic dissection or aneurysm.    MUSCULOSKELETAL: No frankly destructive bony lesions.      Impression    IMPRESSION:   1.  Mechanical small bowel obstruction with a transition in the deep  central pelvis.  2.  Mesenteric edema and fluid are present, a component of ischemia is  not excluded.    BILL FRANK MD         SYSTEM ID:  [id number]

## 2024-06-28 NOTE — PROGRESS NOTES
Cross cover note:    Notified by RN that patient is having increasing abdominal pain, distention and nausea.  Currently admitted for small bowel obstruction.  NG tube accidentally removed last evening.  Trial of clears with worsening symptoms this afternoon.  Ordered repeat abdominal x-ray that showed persistent dilated loops of air-filled small bowel concerning for small bowel obstruction.  Given worsening symptoms and nausea will place NG tube. NPO. NG tube placed successfully, on low intermittent suction.     SHANT Ley PA-C on 6/27/2024 at 10:15 PM

## 2024-06-28 NOTE — PLAN OF CARE
"Care from [id number]    Inpatient Progress Note:  For complete assessment see flow sheet documentation.    BP (!) 163/75 (BP Location: Left arm)   Pulse 96   Temp 99.8  F (37.7  C) (Oral)   Resp 18   Ht 1.6 m (5' 3\")   Wt 88 kg (193 lb 14.4 oz)   SpO2 94%   BMI 34.35 kg/m       Patient is A&Ox4, up SBA, intermittent nausea, NGT to low intermittent suction, IVF @ 100, passing gas and stool over night, denies need for pain meds over night, still requiring 1L NC     Goal Outcome Evaluation:      Plan of Care Reviewed With: patient    Overall Patient Progress: no changeOverall Patient Progress: no change    Outcome Evaluation: NGT still in place, passing gas and stool      Problem: Adult Inpatient Plan of Care  Goal: Plan of Care Review  Description: The Plan of Care Review/Shift note should be completed every shift.  The Outcome Evaluation is a brief statement about your assessment that the patient is improving, declining, or no change.  This information will be displayed automatically on your shift  note.  Outcome: Progressing  Flowsheets (Taken 6/28/2024 0625)  Outcome Evaluation: NGT still in place, passing gas and stool  Plan of Care Reviewed With: patient  Overall Patient Progress: no change  Goal: Patient-Specific Goal (Individualized)  Description: You can add care plan individualizations to a care plan. Examples of Individualization might be:  \"Parent requests to be called daily at 9am for status\", \"I have a hard time hearing out of my right ear\", or \"Do not touch me to wake me up as it startles  me\".  Outcome: Progressing  Goal: Absence of Hospital-Acquired Illness or Injury  Outcome: Progressing  Intervention: Identify and Manage Fall Risk  Recent Flowsheet Documentation  Taken 6/28/2024 0045 by Norma Brumfield, RN  Safety Promotion/Fall Prevention: activity supervised  Intervention: Prevent Skin Injury  Recent Flowsheet Documentation  Taken 6/28/2024 0045 by Norma Brumfield, RN  Body Position: " position changed independently  Intervention: Prevent Infection  Recent Flowsheet Documentation  Taken 6/28/2024 0045 by Norma Brumfield, RN  Infection Prevention: rest/sleep promoted  Goal: Optimal Comfort and Wellbeing  Outcome: Progressing  Goal: Readiness for Transition of Care  Outcome: Progressing     Problem: Bowel Disease, Inflammatory (Ulcerative Colitis or Crohn's Disease)  Goal: Optimal Adaptation to Chronic Illness  Outcome: Progressing  Goal: Absence of Infection Signs and Symptoms  Outcome: Progressing  Goal: Optimal Nutrition Delivery  Outcome: Progressing  Goal: Optimal Pain Control and Function  Outcome: Progressing     Problem: Comorbidity Management  Goal: Blood Glucose Levels Within Targeted Range  Outcome: Progressing  Intervention: Monitor and Manage Glycemia  Recent Flowsheet Documentation  Taken 6/28/2024 0045 by Norma Brumfield, RN  Medication Review/Management: medications reviewed

## 2024-06-28 NOTE — OP NOTE
[facility] General Surgery Operative Note    Pre-operative diagnosis: Small bowel obstruction    Post-operative diagnosis: same   Procedure: 1.) diagnostic laparoscopy converted to exploratory laparotomy with lysis of adhesions and small bowel resection   2.) creation of primary side-to-side small bowel anastomosis    Surgeon: Enoch Duarte MD   Assistant(s): Nir Woodard PA-C  The Physician Assistant was medically necessary for their expertise in prepping, camera management, suctioning, suturing and retraction.   Anesthesia: General and local anesthesia with 0.5% marcaine   Estimated blood loss:  Specimen: 20 cc  Small bowel                INDICATION FOR OPERATION: This is a 73 year old female who is admitted to the hospital initially with enterocolitis but on repeat CT scan today was concerning for a SBO with possible ischemic changes. I recommended presenting to the OR for exploration and possible bowel resection. She agreed to proceed after hearing the risks and benefits of surgery. Please see my consult note for a full discussion on the decision to proceed to surgery.     DESCRIPTION OF PROCEDURE:  The patient was taken to the operating room and placed on the table in supine position.  General endotracheal anesthesia was induced. A sharpe catheter was placed. The abdomen was prepped and draped in standard sterile fashion.      A 5 mm incision was made in the left upper quadrant at Napoles's point. Access into the abdomen was then achieved with with 5 mm 0 degree laparoscopic camera and the [facility] [employer] trochar. A pneumoperitoneum was established and the abdomen was surveyed for injury from our port placement and none was seen. On quick examination of the abdomen there was a significant amount of inflammation in the pelvis with inflammatory ascites and fibrinous exudates matted across the small bowel in the pelvis. Two additional 5 mm ports were placed in the upper abdomen and we turned our attention to  the pelvis after placing the patient in Trendelenburg. I carefully began bluntly breaking inflammatory adhesions in the pelvis. It was clear that the bowel was trying to wall off something. We continued working for several minutes but were making minimal progress as the bowel was quite adherent to the peritoneum in the pelvis as well. I then placed a hand port in the lower abdomen and another 5 mm port in the right upper quadrant. I could now easily break adhesions with gentle blunt dissection and felt a very tight adhesive band in the pelvis. Unfortunately, I could not see the band due to the very dilated bowel surrounding it and in addition we saw purulent appearing fluid leaking out. At this point I was highly concerned for a perforated segment of bowel and decided to convert to an open procedure. The hand port was removed and the fascia and skin incisions were enlarged. A large Gigi wound retractor was then placed. A few ml of purulent peritoneal fluid were collected and sent for culture. The bowel was eviscerated and run into the pelvis where we found the tight adhesive band and also saw leakage of enteric contents from a focally ischemic segment of bowel adjacent to the band. The band which was tethered down to the retroperitoneum was divided with cautery and the bowel sprung free. The focally ischemic area leaking enteric contents was then whip-stitched closed with a 0-Vicryl suture to limit spillage. The bowel was then run from the terminal ileum to the J-J anastomosis and proximally towards the ligament of treitz. She had an antecolic patrice limb which appeared normal. There was no evidence of internal hernia formation from her prior bypass surgery. The ileum where the adhesive band had caused a bowel obstruction was significantly inflamed and edematous with a few focal areas of full thickness ischemia and one area with perforation which we had already whip-stitched closed. I decided that this non-viable  segment of bowel should be resected but we would also have to remove the adjacent inflamed bowel so I could have a healthy area for my anastomosis. A GURDEEP-75 blue load stapler was used to divide the bowel proximally and distally at healthy appearing segments of bowel. The mesentery was then divided with the ligasure device and the specimen labelled small bowel and measuring ~ 110 cm was passed off the field and sent to pathology for routine analysis. Our distal staple line stump was > 15 cm away from the ileocecal valve and I felt this was adequate for creation of a small bowel anastomosis here. A side-to-side, functional end-to-end small bowel anastomosis was then created with a GURDEEP-75 blue load stapler. The common enterotomy was closed with several 3-0 Silk interrupted Lembert sutures. The mesenteric defect was closed with a 3-0 Silk suture. The bowel was then returned to the abdominal cavity.     The abdomen was irrigated with several liters of warm normal saline. The fascia was closed with running looped 0-PDS sutures. The subcutaneous tissues were irrigated with saline. The midline skin incision and port site incisions were closed with skin staples. Sterile bandages were then applied.  The patient was then woken, extubated, and transported to the Postanesthesia Care Unit in satisfactory condition. Sponge and needle counts were correct on two counts at the conclusion of the procedure.     FINDINGS:   1.) adhesive band in the pelvis causing a complete bowel obstruction with several areas of full thickness ischemia just adjacent to the adhesive band and one full thickness perforation with leakage of enteric contents - this segment of bowel and the adjacent inflamed bowel was resected and a small bowel anastomosis was created     Enoch Duarte MD

## 2024-06-29 ENCOUNTER — APPOINTMENT (OUTPATIENT)
Dept: PHYSICAL THERAPY | Facility: CLINIC | Age: 74
DRG: 329 | End: 2024-06-29
Attending: INTERNAL MEDICINE
Payer: MEDICARE

## 2024-06-29 LAB
ANION GAP SERPL CALCULATED.3IONS-SCNC: 16 MMOL/L (ref 7–15)
BASOPHILS # BLD AUTO: 0 10E3/UL (ref 0–0.2)
BASOPHILS NFR BLD AUTO: 0 %
BUN SERPL-MCNC: 8.4 MG/DL (ref 8–23)
CALCIUM SERPL-MCNC: 7.7 MG/DL (ref 8.8–10.2)
CHLORIDE SERPL-SCNC: 94 MMOL/L (ref 98–107)
CREAT SERPL-MCNC: 0.64 MG/DL (ref 0.51–0.95)
DEPRECATED HCO3 PLAS-SCNC: 21 MMOL/L (ref 22–29)
EGFRCR SERPLBLD CKD-EPI 2021: >90 ML/MIN/1.73M2
EOSINOPHIL # BLD AUTO: 0 10E3/UL (ref 0–0.7)
EOSINOPHIL NFR BLD AUTO: 0 %
ERYTHROCYTE [DISTWIDTH] IN BLOOD BY AUTOMATED COUNT: 13.2 % (ref 10–15)
GLUCOSE BLDC GLUCOMTR-MCNC: 113 MG/DL (ref 70–99)
GLUCOSE SERPL-MCNC: 122 MG/DL (ref 70–99)
HCT VFR BLD AUTO: 33 % (ref 35–47)
HGB BLD-MCNC: 10.9 G/DL (ref 11.7–15.7)
IMM GRANULOCYTES # BLD: 0.1 10E3/UL
IMM GRANULOCYTES NFR BLD: 1 %
LYMPHOCYTES # BLD AUTO: 0.9 10E3/UL (ref 0.8–5.3)
LYMPHOCYTES NFR BLD AUTO: 9 %
MCH RBC QN AUTO: 29.9 PG (ref 26.5–33)
MCHC RBC AUTO-ENTMCNC: 33 G/DL (ref 31.5–36.5)
MCV RBC AUTO: 91 FL (ref 78–100)
MONOCYTES # BLD AUTO: 0.7 10E3/UL (ref 0–1.3)
MONOCYTES NFR BLD AUTO: 8 %
NEUTROPHILS # BLD AUTO: 8.1 10E3/UL (ref 1.6–8.3)
NEUTROPHILS NFR BLD AUTO: 82 %
NRBC # BLD AUTO: 0 10E3/UL
NRBC BLD AUTO-RTO: 0 /100
PLATELET # BLD AUTO: 357 10E3/UL (ref 150–450)
POTASSIUM SERPL-SCNC: 3.8 MMOL/L (ref 3.4–5.3)
RBC # BLD AUTO: 3.64 10E6/UL (ref 3.8–5.2)
SODIUM SERPL-SCNC: 131 MMOL/L (ref 135–145)
WBC # BLD AUTO: 9.9 10E3/UL (ref 4–11)

## 2024-06-29 PROCEDURE — 97161 PT EVAL LOW COMPLEX 20 MIN: CPT | Mod: GP | Performed by: PHYSICAL THERAPIST

## 2024-06-29 PROCEDURE — 36415 COLL VENOUS BLD VENIPUNCTURE: CPT | Performed by: PHYSICIAN ASSISTANT

## 2024-06-29 PROCEDURE — 250N000013 HC RX MED GY IP 250 OP 250 PS 637: Performed by: STUDENT IN AN ORGANIZED HEALTH CARE EDUCATION/TRAINING PROGRAM

## 2024-06-29 PROCEDURE — 85025 COMPLETE CBC W/AUTO DIFF WBC: CPT | Performed by: PHYSICIAN ASSISTANT

## 2024-06-29 PROCEDURE — 250N000011 HC RX IP 250 OP 636: Performed by: STUDENT IN AN ORGANIZED HEALTH CARE EDUCATION/TRAINING PROGRAM

## 2024-06-29 PROCEDURE — 97116 GAIT TRAINING THERAPY: CPT | Mod: GP | Performed by: PHYSICAL THERAPIST

## 2024-06-29 PROCEDURE — 80048 BASIC METABOLIC PNL TOTAL CA: CPT | Performed by: PHYSICIAN ASSISTANT

## 2024-06-29 PROCEDURE — 97530 THERAPEUTIC ACTIVITIES: CPT | Mod: GP | Performed by: PHYSICAL THERAPIST

## 2024-06-29 PROCEDURE — 99232 SBSQ HOSP IP/OBS MODERATE 35: CPT | Performed by: INTERNAL MEDICINE

## 2024-06-29 PROCEDURE — 120N000001 HC R&B MED SURG/OB

## 2024-06-29 PROCEDURE — 258N000003 HC RX IP 258 OP 636: Performed by: INTERNAL MEDICINE

## 2024-06-29 RX ADMIN — PIPERACILLIN AND TAZOBACTAM 3.38 G: 3; .375 INJECTION, POWDER, FOR SOLUTION INTRAVENOUS at 13:50

## 2024-06-29 RX ADMIN — ACETAMINOPHEN 1000 MG: 500 TABLET, FILM COATED ORAL at 20:28

## 2024-06-29 RX ADMIN — ONDANSETRON 4 MG: 2 INJECTION INTRAMUSCULAR; INTRAVENOUS at 19:26

## 2024-06-29 RX ADMIN — PIPERACILLIN AND TAZOBACTAM 3.38 G: 3; .375 INJECTION, POWDER, FOR SOLUTION INTRAVENOUS at 20:38

## 2024-06-29 RX ADMIN — ACETAMINOPHEN 1000 MG: 500 TABLET, FILM COATED ORAL at 13:26

## 2024-06-29 RX ADMIN — HYDROMORPHONE HYDROCHLORIDE 2 MG: 2 TABLET ORAL at 22:41

## 2024-06-29 RX ADMIN — ACETAMINOPHEN 1000 MG: 500 TABLET, FILM COATED ORAL at 08:12

## 2024-06-29 RX ADMIN — DICYCLOMINE HYDROCHLORIDE 10 MG: 10 CAPSULE ORAL at 20:28

## 2024-06-29 RX ADMIN — SERTRALINE 100 MG: 100 TABLET, FILM COATED ORAL at 08:13

## 2024-06-29 RX ADMIN — GABAPENTIN 900 MG: 300 CAPSULE ORAL at 13:26

## 2024-06-29 RX ADMIN — Medication 1 CAPSULE: at 20:28

## 2024-06-29 RX ADMIN — ENOXAPARIN SODIUM 40 MG: 40 INJECTION SUBCUTANEOUS at 17:05

## 2024-06-29 RX ADMIN — HYDROMORPHONE HYDROCHLORIDE 0.2 MG: 0.2 INJECTION, SOLUTION INTRAMUSCULAR; INTRAVENOUS; SUBCUTANEOUS at 06:47

## 2024-06-29 RX ADMIN — SODIUM CHLORIDE: 9 INJECTION, SOLUTION INTRAVENOUS at 22:29

## 2024-06-29 RX ADMIN — DICYCLOMINE HYDROCHLORIDE 10 MG: 10 CAPSULE ORAL at 08:11

## 2024-06-29 RX ADMIN — GABAPENTIN 900 MG: 300 CAPSULE ORAL at 08:14

## 2024-06-29 RX ADMIN — Medication 1 CAPSULE: at 08:13

## 2024-06-29 RX ADMIN — PIPERACILLIN AND TAZOBACTAM 3.38 G: 3; .375 INJECTION, POWDER, FOR SOLUTION INTRAVENOUS at 02:06

## 2024-06-29 RX ADMIN — ACETAMINOPHEN 1000 MG: 500 TABLET, FILM COATED ORAL at 02:06

## 2024-06-29 RX ADMIN — DICYCLOMINE HYDROCHLORIDE 10 MG: 10 CAPSULE ORAL at 13:25

## 2024-06-29 RX ADMIN — PIPERACILLIN AND TAZOBACTAM 3.38 G: 3; .375 INJECTION, POWDER, FOR SOLUTION INTRAVENOUS at 08:10

## 2024-06-29 RX ADMIN — DICYCLOMINE HYDROCHLORIDE 10 MG: 10 CAPSULE ORAL at 17:05

## 2024-06-29 RX ADMIN — GABAPENTIN 900 MG: 300 CAPSULE ORAL at 20:28

## 2024-06-29 RX ADMIN — HYDROMORPHONE HYDROCHLORIDE 0.2 MG: 0.2 INJECTION, SOLUTION INTRAMUSCULAR; INTRAVENOUS; SUBCUTANEOUS at 00:18

## 2024-06-29 ASSESSMENT — ACTIVITIES OF DAILY LIVING (ADL)
ADLS_ACUITY_SCORE: 27
ADLS_ACUITY_SCORE: 28
ADLS_ACUITY_SCORE: 27
ADLS_ACUITY_SCORE: 28
ADLS_ACUITY_SCORE: 27
ADLS_ACUITY_SCORE: 28
ADLS_ACUITY_SCORE: 28
ADLS_ACUITY_SCORE: 27
ADLS_ACUITY_SCORE: 28
ADLS_ACUITY_SCORE: 28
ADLS_ACUITY_SCORE: 27
ADLS_ACUITY_SCORE: 28
ADLS_ACUITY_SCORE: 28

## 2024-06-29 NOTE — PROGRESS NOTES
"    [facility]     Hospitalist Progress Note     Assessment & Plan     ASSESSMENT    73F with hx of chronic pain, PHIL, and previous gastric bypass surgery presents with abdominal pain and found to have a small bowel obstruction potentially causing bowel ischemia and perforation and is now s/p ex lap with small bowel resection 06/28.    PLAN    Small Bowel Obstruction Causing Bowel Ischemia  -Presents with abdominal pain and found to have a small bowel obstruction potentially causing bowel ischemia  -During surgery, evidence of adhesion causing obstruction, bowel ischemia, and perforation s/p bowel resection  PLAN  -NPO w/ NGT in place, diet advancement per general surgery  -NS@75ml/hr  -Continue Zosyn, course per general surgery  -Pain regimen in place  -Remove Otero catheter today and physical therapy consult for mobility needs    Other issues  -Chronic neuropathy: Continue home gabapentin  -Hyponatremia: Continue with gentle IV NS today  -PHIL and MDD: Home medications  -Pulmonary nodule: Follow-up with PCP as an outpatient  -Nocturnal hypoxia: Will refer for sleep study as an outpatient at discharge    DVT Prophy  -LMWH    Disposition  -Medically ready for discharge: Likely Monday depending on how diet advancement progresses      Renan Bradley MD    Subjective     Seen at bedside.  Patient requests to advance diet although no bowel movements yet and still some nausea even with NG tube in.  Wants Otero catheter out as well.  Pain under control.        Objective   Blood pressure 132/45, pulse 91, temperature 99  F (37.2  C), temperature source Oral, resp. rate 18, height 1.6 m (5' 3\"), weight 89.1 kg (196 lb 6.9 oz), SpO2 96%.    PHYSICAL EXAM  General: In no acute distress  CV: RRR.  Lungs: CTAB. Nl WOB.  Abd: Abdominal binder in place, mild tenderness throughout.  Ext: No edema.    LABS AND IMAGING  Reviewed and pertinent results discussed in assessment and plan.   "

## 2024-06-29 NOTE — PROGRESS NOTES
"[facility]   General Surgery Progress Note          Assessment and Plan:   Assessment:   POD#1 s/p diagnostic laparoscopy converted to laparotomy, lysis of adhesions and small bowel resection  Adhesive band in the pelvis causing complete SBO causing perforation  Postoperative ileus as expected      Plan:   -Continue NPO and NGT, ice chips ok  -IV ABX: Zosyn  -Sharpe can be removed when up and ambulating  -Pain Management: IV dilaudid  -IV fluids  -dvt proph: lovenox  -Increase activity as tolerated, sit up in chair and walk halls as able today  -Await return of bowel function     Quite sleepy on my visit. No bowel function. Sharpe removed, waiting to void. Plan as above.   Quynh Lechuga MD          Interval History:   Resting in bed, doing ok. Tired. Has some soreness as expected. She is NPO and NGT is in place. She tells me the sharpe catheter is bothering her, feels like she has to urinate all the time. She has not been up walking yet.          Physical Exam:   Blood pressure 132/45, pulse 91, temperature 99  F (37.2  C), temperature source Oral, resp. rate 18, height 1.6 m (5' 3\"), weight 89.1 kg (196 lb 6.9 oz), SpO2 96%.    I/O last 3 completed shifts:  In: 1500 [I.V.:1500]  Out: 1995 [Urine:1175; Emesis/NG output:800; Blood:20]    Abdomen: soft, ND, +mild diffuse tenderness, +BS  Inc(s) - bandages in place          Data:     Recent Labs   Lab Test 06/29/24  0637 06/28/24  0622 06/27/24  0714   HGB 10.9* 10.1* 10.2*   WBC 9.9 11.0 8.3     Recent Labs   Lab 06/29/24  0637 06/28/24  1904 06/28/24  1902 06/28/24  0622 06/27/24  0714   *  --   --  125* 129*   POTASSIUM 3.8  --   --  3.7 4.5   CHLORIDE 94*  --   --  89* 92*   CO2 21*  --   --  24 24   ANIONGAP 16*  --   --  12 13   * 120* 125* 105* 125*   BUN 8.4  --   --  11.6 15.5   CR 0.64  --   --  0.65 0.69   GFRESTIMATED >90  --   --  >90 >90   ERICK 7.7*  --   --  8.0* 8.1*          Nir Woodard PA-C  Text page [phone] " 8am-4pm  After 4pm call [phone]

## 2024-06-29 NOTE — PROGRESS NOTES
06/29/24 1400   Appointment Info   Signing Clinician's Name / Credentials (PT) Lety Cole PT   Living Environment   People in Home spouse   Current Living Arrangements house   Home Accessibility stairs to enter home;stairs within home   Number of Stairs, Main Entrance 2   Number of Stairs, Within Home, Primary eight   Stair Railings, Within Home, Primary railings safe and in good condition   Transportation Anticipated family or friend will provide   Living Environment [place]   Self-Care   Usual Activity Tolerance good   Current Activity Tolerance fair   Equipment Currently Used at Home none   Fall history within last six months no   Activity/Exercise/Self-Care Comment generally active, no AD use does not have one at home   General Information   Onset of Illness/Injury or Date of Surgery 06/28/24   Pertinent History of Current Problem (include personal factors and/or comorbidities that impact the POC) s/p ex Lap; 73 year old female with a PMH significant for remote history of gastric bypass, TIA, iron deficiency anemia, and history of subarachnoid hemorrhage due to fall, who was admitted on 6/23/2024 with acute enterocolitis possibly due to food borne illness vs viral etiology, now appears to have ileus vs SBO.   Existing Precautions/Restrictions fall;abdominal   Cognition   Affect/Mental Status (Cognition) WFL   Orientation Status (Cognition) oriented x 4   Follows Commands (Cognition) WFL   Safety Deficit (Cognition) at risk behavior observed   Pain Assessment   Patient Currently in Pain Yes, see Vital Sign flowsheet   Posture    Posture Forward head position;Protracted shoulders   Range of Motion (ROM)   Range of Motion ROM is WFL   Strength (Manual Muscle Testing)   Strength (Manual Muscle Testing) Deficits observed during functional mobility   Transfers   Comment, (Transfers) min A w WW   Gait/Stairs (Locomotion)   Comment, (Gait/Stairs) min A w WW dec steplength   slow pace, inc pain over 10 feet   Balance   Balance Comments UE w ambulation, no overt balance deficity, mobility impaired by pain   Clinical Impression   Criteria for Skilled Therapeutic Intervention Yes, treatment indicated   PT Diagnosis (PT) s/p SB resection   Influenced by the following impairments impaired gait pain dec indep transfers   Functional limitations due to impairments impaired mobility   Clinical Presentation (PT Evaluation Complexity) evolving   Clinical Decision Making (Complexity) low complexity   Planned Therapy Interventions (PT) bed mobility training;gait training;stair training;strengthening;transfer training   Risk & Benefits of therapy have been explained evaluation/treatment results reviewed;care plan/treatment goals reviewed;risks/benefits reviewed   PT Discharge Planning   PT Discharge Recommendation (DC Rec) home with assist;home with home care physical therapy   PT Rationale for DC Rec Pt mobilzing below her indep baseline post op, req A x 1 for transers and gait, limited by fatigue and pain, anticipate pt to improve to SBA by DC with med mgmt and pain control will benefit from PT during stay may need home PT depending on progress will need assist on stairs at home need to verify  can help.

## 2024-06-29 NOTE — PLAN OF CARE
"  Problem: Adult Inpatient Plan of Care  Goal: Plan of Care Review  Description: The Plan of Care Review/Shift note should be completed every shift.  The Outcome Evaluation is a brief statement about your assessment that the patient is improving, declining, or no change.  This information will be displayed automatically on your shift  note.  Outcome: Progressing  Flowsheets (Taken 6/29/2024 1813)  Outcome Evaluation: voiding ---  denies pain and nausea  Plan of Care Reviewed With: patient  Overall Patient Progress: improving  Goal: Patient-Specific Goal (Individualized)  Description: You can add care plan individualizations to a care plan. Examples of Individualization might be:  \"Parent requests to be called daily at 9am for status\", \"I have a hard time hearing out of my right ear\", or \"Do not touch me to wake me up as it startles  me\".  Outcome: Progressing  Goal: Absence of Hospital-Acquired Illness or Injury  Outcome: Progressing  Intervention: Identify and Manage Fall Risk  Recent Flowsheet Documentation  Taken 6/29/2024 1100 by Miranda Santiago RN  Safety Promotion/Fall Prevention: clutter free environment maintained  Intervention: Prevent and Manage VTE (Venous Thromboembolism) Risk  Recent Flowsheet Documentation  Taken 6/29/2024 1100 by Miranda Santiago RN  VTE Prevention/Management: SCDs on (sequential compression devices)  Intervention: Prevent Infection  Recent Flowsheet Documentation  Taken 6/29/2024 1100 by Miranda Santiago RN  Infection Prevention: hand hygiene promoted  Goal: Optimal Comfort and Wellbeing  Outcome: Progressing  Intervention: Monitor Pain and Promote Comfort  Recent Flowsheet Documentation  Taken 6/29/2024 0812 by Miranda Santiago RN  Pain Management Interventions: medication (see MAR)  Goal: Readiness for Transition of Care  Outcome: Progressing   Goal Outcome Evaluation:      Plan of Care Reviewed With: patient    Overall Patient Progress: improvingOverall Patient Progress: " improving    Outcome Evaluation: voiding ---  denies pain and nausea

## 2024-06-29 NOTE — PLAN OF CARE
"Pt POD 0->1 from ex lap, BENI, resection. Sites covered with gauze, SHEILA- CDI. Hypo BS. No gas yet. NGT to LIS- 400 overnight. Otero- 400 overnight. Reported night sweats x1- no fever noted, linens changed. Turns with assist. Scant amount of ice chips given. Lungs with ex wz. Weaned O2 overnight from 8L down to 3L.     Shift events:   weaned O2. Night sweats x1. Otherwise uneventful night, slept well. No significant change in condition.     Treatment Plan:   Zosyn, pain management, monitor return of bowel function. General Surgery following                                          Problem: Adult Inpatient Plan of Care  Goal: Plan of Care Review  Description: The Plan of Care Review/Shift note should be completed every shift.  The Outcome Evaluation is a brief statement about your assessment that the patient is improving, declining, or no change.  This information will be displayed automatically on your shift  note.  Outcome: Progressing  Flowsheets (Taken 6/29/2024 0613)  Outcome Evaluation: Weaned O2 from 8 down to 3L. Pain meds x1. Sweats x1. Linen change.  Plan of Care Reviewed With: patient  Overall Patient Progress: improving  Goal: Patient-Specific Goal (Individualized)  Description: You can add care plan individualizations to a care plan. Examples of Individualization might be:  \"Parent requests to be called daily at 9am for status\", \"I have a hard time hearing out of my right ear\", or \"Do not touch me to wake me up as it startles  me\".  Outcome: Progressing  Goal: Absence of Hospital-Acquired Illness or Injury  Outcome: Progressing  Intervention: Identify and Manage Fall Risk  Recent Flowsheet Documentation  Taken 6/29/2024 0020 by Marian Steele RN  Safety Promotion/Fall Prevention: safety round/check completed  Intervention: Prevent Skin Injury  Recent Flowsheet Documentation  Taken 6/29/2024 0020 by Marian Steele, RN  Body Position: weight shifting  Intervention: Prevent and Manage VTE (Venous " Thromboembolism) Risk  Recent Flowsheet Documentation  Taken 6/29/2024 0020 by Marian Steele, RN  VTE Prevention/Management: SCDs on (sequential compression devices)  Intervention: Prevent Infection  Recent Flowsheet Documentation  Taken 6/29/2024 0020 by Marian Steele, RN  Infection Prevention: rest/sleep promoted  Goal: Optimal Comfort and Wellbeing  Outcome: Progressing  Intervention: Monitor Pain and Promote Comfort  Recent Flowsheet Documentation  Taken 6/29/2024 0018 by Marian Steele, RN  Pain Management Interventions: medication (see MAR)  Goal: Readiness for Transition of Care  Outcome: Progressing     Problem: Bowel Disease, Inflammatory (Ulcerative Colitis or Crohn's Disease)  Goal: Optimal Adaptation to Chronic Illness  Outcome: Progressing  Goal: Absence of Infection Signs and Symptoms  Outcome: Progressing  Goal: Optimal Nutrition Delivery  Outcome: Progressing  Goal: Optimal Pain Control and Function  Outcome: Progressing  Intervention: Prevent or Manage Pain  Recent Flowsheet Documentation  Taken 6/29/2024 0018 by Marian Steele, RN  Pain Management Interventions: medication (see MAR)     Problem: Comorbidity Management  Goal: Blood Glucose Levels Within Targeted Range  Outcome: Progressing   Goal Outcome Evaluation:      Plan of Care Reviewed With: patient    Overall Patient Progress: improvingOverall Patient Progress: improving    Outcome Evaluation: Weaned O2 from 8 down to 3L. Pain meds x1. Sweats x1. Linen change.

## 2024-06-29 NOTE — PLAN OF CARE
"o Do:  End of Shift Summary  For vital signs and complete assessments, please see documentation flowsheets.     Pertinent assessments: Alert --- up in chair with lift for most of day --- appetite good ---inc of bowel and bladder ----  Major Shift Events states feeling better  Treatment Plan: monitor  Bedside Nurse: Miranda Santiago RN     Problem: Adult Inpatient Plan of Care  Goal: Plan of Care Review  Description: The Plan of Care Review/Shift note should be completed every shift.  The Outcome Evaluation is a brief statement about your assessment that the patient is improving, declining, or no change.  This information will be displayed automatically on your shift  note.  Outcome: Progressing  Flowsheets (Taken 6/29/2024 1813)  Outcome Evaluation: voiding ---  denies pain and nausea  Plan of Care Reviewed With: patient  Overall Patient Progress: improving  Goal: Patient-Specific Goal (Individualized)  Description: You can add care plan individualizations to a care plan. Examples of Individualization might be:  \"Parent requests to be called daily at 9am for status\", \"I have a hard time hearing out of my right ear\", or \"Do not touch me to wake me up as it startles  me\".  Outcome: Progressing  Goal: Absence of Hospital-Acquired Illness or Injury  Outcome: Progressing  Intervention: Identify and Manage Fall Risk  Recent Flowsheet Documentation  Taken 6/29/2024 1100 by Miranda Santiago RN  Safety Promotion/Fall Prevention: clutter free environment maintained  Intervention: Prevent and Manage VTE (Venous Thromboembolism) Risk  Recent Flowsheet Documentation  Taken 6/29/2024 1100 by Miranda Santiago RN  VTE Prevention/Management: SCDs on (sequential compression devices)  Intervention: Prevent Infection  Recent Flowsheet Documentation  Taken 6/29/2024 1100 by Miranda Santiago RN  Infection Prevention: hand hygiene promoted  Goal: Optimal Comfort and Wellbeing  Outcome: Progressing  Intervention: Monitor Pain and Promote " Comfort  Recent Flowsheet Documentation  Taken 6/29/2024 0812 by Miranda Santiago, RN  Pain Management Interventions: medication (see MAR)  Goal: Readiness for Transition of Care  Outcome: Progressing   Goal Outcome Evaluation:      Plan of Care Reviewed With: patient    Overall Patient Progress: improvingOverall Patient Progress: improving    Outcome Evaluation: voiding ---  denies pain and nausea

## 2024-06-29 NOTE — PLAN OF CARE
"Assessments:   A/O, on 8L O2 via oxymask, capno in place. Initially wheezy upon arrival from PACU but resolved. Complains of pain about 5/10, PRN IV dilaudid given. Otero draining well. Abdominal incisions covered with gauze, abdominal binder used by patient. Passing gas per patient. On NPO, IV NS at 10ml/hr. . NG to LIS with 200ml output.     Treatment Plan:   Zosyn, pain management, monitor return of bowel function. General Surgery following    Bedside Nurse: Hal Gilliland RN       Problem: Adult Inpatient Plan of Care  Goal: Plan of Care Review  Description: The Plan of Care Review/Shift note should be completed every shift.  The Outcome Evaluation is a brief statement about your assessment that the patient is improving, declining, or no change.  This information will be displayed automatically on your shift  note.  Outcome: Progressing  Flowsheets (Taken 6/28/2024 7007)  Outcome Evaluation: wheezing resolved, still on 8L o2/oxymask, pain controlled well with current regimen  Plan of Care Reviewed With:   patient   spouse  Overall Patient Progress: improving  Goal: Patient-Specific Goal (Individualized)  Description: You can add care plan individualizations to a care plan. Examples of Individualization might be:  \"Parent requests to be called daily at 9am for status\", \"I have a hard time hearing out of my right ear\", or \"Do not touch me to wake me up as it startles  me\".  Outcome: Progressing  Goal: Absence of Hospital-Acquired Illness or Injury  Outcome: Progressing  Intervention: Identify and Manage Fall Risk  Recent Flowsheet Documentation  Taken 6/28/2024 1830 by Hal Gilliland RN  Safety Promotion/Fall Prevention:   activity supervised   safety round/check completed   treat reversible contributory factors   assistive device/personal items within reach   patient and family education   room near nurse's station   lighting adjusted  Intervention: Prevent Skin Injury  Recent Flowsheet " Documentation  Taken 6/28/2024 1830 by Hal Gilliland RN  Body Position: supine, head elevated  Intervention: Prevent and Manage VTE (Venous Thromboembolism) Risk  Recent Flowsheet Documentation  Taken 6/28/2024 1830 by Hal Gilliland RN  VTE Prevention/Management: SCDs on (sequential compression devices)  Intervention: Prevent Infection  Recent Flowsheet Documentation  Taken 6/28/2024 1830 by Hal Gilliland RN  Infection Prevention: rest/sleep promoted  Goal: Optimal Comfort and Wellbeing  Outcome: Progressing  Goal: Readiness for Transition of Care  Outcome: Progressing     Problem: Bowel Disease, Inflammatory (Ulcerative Colitis or Crohn's Disease)  Goal: Optimal Adaptation to Chronic Illness  Outcome: Progressing  Goal: Absence of Infection Signs and Symptoms  Outcome: Progressing  Goal: Optimal Nutrition Delivery  Outcome: Progressing  Goal: Optimal Pain Control and Function  Outcome: Progressing     Problem: Comorbidity Management  Goal: Blood Glucose Levels Within Targeted Range  Outcome: Progressing  Intervention: Monitor and Manage Glycemia  Recent Flowsheet Documentation  Taken 6/28/2024 1830 by Hal Gilliland RN  Medication Review/Management: medications reviewed     Goal Outcome Evaluation:      Plan of Care Reviewed With: patient, spouse    Overall Patient Progress: improving    Outcome Evaluation: wheezing resolved, still on 8L o2/oxymask, pain controlled well with current regimen

## 2024-06-30 ENCOUNTER — APPOINTMENT (OUTPATIENT)
Dept: PHYSICAL THERAPY | Facility: CLINIC | Age: 74
DRG: 329 | End: 2024-06-30
Payer: MEDICARE

## 2024-06-30 LAB
ANION GAP SERPL CALCULATED.3IONS-SCNC: 16 MMOL/L (ref 7–15)
BUN SERPL-MCNC: 6.1 MG/DL (ref 8–23)
CALCIUM SERPL-MCNC: 7.8 MG/DL (ref 8.8–10.2)
CHLORIDE SERPL-SCNC: 96 MMOL/L (ref 98–107)
CREAT SERPL-MCNC: 0.54 MG/DL (ref 0.51–0.95)
DEPRECATED HCO3 PLAS-SCNC: 19 MMOL/L (ref 22–29)
EGFRCR SERPLBLD CKD-EPI 2021: >90 ML/MIN/1.73M2
ERYTHROCYTE [DISTWIDTH] IN BLOOD BY AUTOMATED COUNT: 13.2 % (ref 10–15)
GLUCOSE BLDC GLUCOMTR-MCNC: 106 MG/DL (ref 70–99)
GLUCOSE BLDC GLUCOMTR-MCNC: 99 MG/DL (ref 70–99)
GLUCOSE SERPL-MCNC: 93 MG/DL (ref 70–99)
HCT VFR BLD AUTO: 31 % (ref 35–47)
HGB BLD-MCNC: 9.8 G/DL (ref 11.7–15.7)
MAGNESIUM SERPL-MCNC: 2.3 MG/DL (ref 1.7–2.3)
MCH RBC QN AUTO: 29.5 PG (ref 26.5–33)
MCHC RBC AUTO-ENTMCNC: 31.6 G/DL (ref 31.5–36.5)
MCV RBC AUTO: 93 FL (ref 78–100)
PHOSPHATE SERPL-MCNC: 1.6 MG/DL (ref 2.5–4.5)
PHOSPHATE SERPL-MCNC: 2 MG/DL (ref 2.5–4.5)
PLATELET # BLD AUTO: 434 10E3/UL (ref 150–450)
POTASSIUM SERPL-SCNC: 3.6 MMOL/L (ref 3.4–5.3)
RBC # BLD AUTO: 3.32 10E6/UL (ref 3.8–5.2)
SODIUM SERPL-SCNC: 131 MMOL/L (ref 135–145)
WBC # BLD AUTO: 12.9 10E3/UL (ref 4–11)

## 2024-06-30 PROCEDURE — 250N000013 HC RX MED GY IP 250 OP 250 PS 637: Performed by: INTERNAL MEDICINE

## 2024-06-30 PROCEDURE — 99232 SBSQ HOSP IP/OBS MODERATE 35: CPT | Performed by: INTERNAL MEDICINE

## 2024-06-30 PROCEDURE — 258N000003 HC RX IP 258 OP 636: Performed by: INTERNAL MEDICINE

## 2024-06-30 PROCEDURE — 36415 COLL VENOUS BLD VENIPUNCTURE: CPT | Performed by: STUDENT IN AN ORGANIZED HEALTH CARE EDUCATION/TRAINING PROGRAM

## 2024-06-30 PROCEDURE — 85027 COMPLETE CBC AUTOMATED: CPT | Performed by: STUDENT IN AN ORGANIZED HEALTH CARE EDUCATION/TRAINING PROGRAM

## 2024-06-30 PROCEDURE — 97116 GAIT TRAINING THERAPY: CPT | Mod: GP

## 2024-06-30 PROCEDURE — 80048 BASIC METABOLIC PNL TOTAL CA: CPT | Performed by: STUDENT IN AN ORGANIZED HEALTH CARE EDUCATION/TRAINING PROGRAM

## 2024-06-30 PROCEDURE — 36415 COLL VENOUS BLD VENIPUNCTURE: CPT | Performed by: SURGERY

## 2024-06-30 PROCEDURE — 250N000011 HC RX IP 250 OP 636: Performed by: STUDENT IN AN ORGANIZED HEALTH CARE EDUCATION/TRAINING PROGRAM

## 2024-06-30 PROCEDURE — 120N000001 HC R&B MED SURG/OB

## 2024-06-30 PROCEDURE — 250N000009 HC RX 250: Performed by: INTERNAL MEDICINE

## 2024-06-30 PROCEDURE — 250N000013 HC RX MED GY IP 250 OP 250 PS 637: Performed by: STUDENT IN AN ORGANIZED HEALTH CARE EDUCATION/TRAINING PROGRAM

## 2024-06-30 PROCEDURE — 97530 THERAPEUTIC ACTIVITIES: CPT | Mod: GP

## 2024-06-30 PROCEDURE — 84100 ASSAY OF PHOSPHORUS: CPT | Performed by: SURGERY

## 2024-06-30 PROCEDURE — 83735 ASSAY OF MAGNESIUM: CPT | Performed by: SURGERY

## 2024-06-30 RX ORDER — ACETAMINOPHEN 325 MG/10.15ML
975 LIQUID ORAL EVERY 6 HOURS
Status: DISCONTINUED | OUTPATIENT
Start: 2024-06-30 | End: 2024-07-06

## 2024-06-30 RX ADMIN — GABAPENTIN 900 MG: 300 CAPSULE ORAL at 14:44

## 2024-06-30 RX ADMIN — PIPERACILLIN AND TAZOBACTAM 3.38 G: 3; .375 INJECTION, POWDER, FOR SOLUTION INTRAVENOUS at 20:26

## 2024-06-30 RX ADMIN — SERTRALINE 100 MG: 100 TABLET, FILM COATED ORAL at 08:16

## 2024-06-30 RX ADMIN — PIPERACILLIN AND TAZOBACTAM 3.38 G: 3; .375 INJECTION, POWDER, FOR SOLUTION INTRAVENOUS at 02:43

## 2024-06-30 RX ADMIN — GABAPENTIN 900 MG: 300 CAPSULE ORAL at 20:09

## 2024-06-30 RX ADMIN — SODIUM CHLORIDE: 9 INJECTION, SOLUTION INTRAVENOUS at 20:26

## 2024-06-30 RX ADMIN — ACETAMINOPHEN 975 MG: 160 SOLUTION ORAL at 09:50

## 2024-06-30 RX ADMIN — ACETAMINOPHEN 975 MG: 160 SOLUTION ORAL at 20:22

## 2024-06-30 RX ADMIN — PIPERACILLIN AND TAZOBACTAM 3.38 G: 3; .375 INJECTION, POWDER, FOR SOLUTION INTRAVENOUS at 14:42

## 2024-06-30 RX ADMIN — Medication 1 CAPSULE: at 08:16

## 2024-06-30 RX ADMIN — DICYCLOMINE HYDROCHLORIDE 10 MG: 10 CAPSULE ORAL at 12:46

## 2024-06-30 RX ADMIN — Medication 1 CAPSULE: at 20:09

## 2024-06-30 RX ADMIN — SODIUM PHOSPHATE, MONOBASIC, MONOHYDRATE AND SODIUM PHOSPHATE, DIBASIC, ANHYDROUS 15 MMOL: 142; 276 INJECTION, SOLUTION INTRAVENOUS at 23:33

## 2024-06-30 RX ADMIN — SODIUM CHLORIDE: 9 INJECTION, SOLUTION INTRAVENOUS at 09:56

## 2024-06-30 RX ADMIN — SODIUM PHOSPHATE, MONOBASIC, MONOHYDRATE AND SODIUM PHOSPHATE, DIBASIC, ANHYDROUS 9 MMOL: 142; 276 INJECTION, SOLUTION INTRAVENOUS at 16:22

## 2024-06-30 RX ADMIN — ONDANSETRON 4 MG: 4 TABLET, ORALLY DISINTEGRATING ORAL at 08:27

## 2024-06-30 RX ADMIN — ENOXAPARIN SODIUM 40 MG: 40 INJECTION SUBCUTANEOUS at 16:26

## 2024-06-30 RX ADMIN — DICYCLOMINE HYDROCHLORIDE 10 MG: 10 CAPSULE ORAL at 08:16

## 2024-06-30 RX ADMIN — SODIUM PHOSPHATE, MONOBASIC, MONOHYDRATE AND SODIUM PHOSPHATE, DIBASIC, ANHYDROUS 9 MMOL: 142; 276 INJECTION, SOLUTION INTRAVENOUS at 12:46

## 2024-06-30 RX ADMIN — ACETAMINOPHEN 975 MG: 160 SOLUTION ORAL at 14:46

## 2024-06-30 RX ADMIN — DICYCLOMINE HYDROCHLORIDE 10 MG: 10 CAPSULE ORAL at 16:25

## 2024-06-30 RX ADMIN — HYDROMORPHONE HYDROCHLORIDE 2 MG: 2 TABLET ORAL at 08:27

## 2024-06-30 RX ADMIN — PIPERACILLIN AND TAZOBACTAM 3.38 G: 3; .375 INJECTION, POWDER, FOR SOLUTION INTRAVENOUS at 08:14

## 2024-06-30 RX ADMIN — GABAPENTIN 900 MG: 300 CAPSULE ORAL at 08:16

## 2024-06-30 RX ADMIN — DICYCLOMINE HYDROCHLORIDE 10 MG: 10 CAPSULE ORAL at 20:09

## 2024-06-30 ASSESSMENT — ACTIVITIES OF DAILY LIVING (ADL)
ADLS_ACUITY_SCORE: 28
DEPENDENT_IADLS:: INDEPENDENT
ADLS_ACUITY_SCORE: 32
ADLS_ACUITY_SCORE: 32
ADLS_ACUITY_SCORE: 28
ADLS_ACUITY_SCORE: 27
ADLS_ACUITY_SCORE: 28
ADLS_ACUITY_SCORE: 28
ADLS_ACUITY_SCORE: 32
ADLS_ACUITY_SCORE: 27
ADLS_ACUITY_SCORE: 28
ADLS_ACUITY_SCORE: 32
ADLS_ACUITY_SCORE: 27
ADLS_ACUITY_SCORE: 32
ADLS_ACUITY_SCORE: 32
ADLS_ACUITY_SCORE: 28

## 2024-06-30 NOTE — PLAN OF CARE
"Goal Outcome Evaluation:      Plan of Care Reviewed With: patient    Overall Patient Progress: improvingOverall Patient Progress: improving    Outcome Evaluation: Pt is up A x 1, Dilaudid  & Tylenol po effective for pain management, mid abd incision cdi, NG on low intermittenr suction, NPO, awating return of bowel function.      Problem: Adult Inpatient Plan of Care  Goal: Plan of Care Review  Description: The Plan of Care Review/Shift note should be completed every shift.  The Outcome Evaluation is a brief statement about your assessment that the patient is improving, declining, or no change.  This information will be displayed automatically on your shift  note.  6/30/2024 0407 by Frederic Duque RN  Outcome: Progressing  Flowsheets (Taken 6/30/2024 0407)  Plan of Care Reviewed With: patient  Overall Patient Progress: improving  6/30/2024 0359 by Frederic Duque RN  Outcome: Progressing  Flowsheets (Taken 6/30/2024 0359)  Outcome Evaluation: Pt is up A x 1, Dilaudid  & Tylenol po effective for pain management, mid abd incision cdi, NG on low intermittenr suction, NPO, awating return of bowel function.  Goal: Patient-Specific Goal (Individualized)  Description: You can add care plan individualizations to a care plan. Examples of Individualization might be:  \"Parent requests to be called daily at 9am for status\", \"I have a hard time hearing out of my right ear\", or \"Do not touch me to wake me up as it startles  me\".  6/30/2024 0407 by Frederic Duque RN  Outcome: Progressing  6/30/2024 0359 by Frederic Duque RN  Outcome: Progressing  Goal: Absence of Hospital-Acquired Illness or Injury  6/30/2024 0407 by Frederic Duque, RN  Outcome: Progressing  6/30/2024 0359 by Frederic Duque RN  Outcome: Progressing  Intervention: Identify and Manage Fall Risk  Recent Flowsheet Documentation  Taken 6/29/2024 2047 by Fredeirc Duque, RN  Safety Promotion/Fall Prevention: clutter free environment maintained  Intervention: Prevent " and Manage VTE (Venous Thromboembolism) Risk  Recent Flowsheet Documentation  Taken 6/29/2024 2047 by Frederic Duque RN  VTE Prevention/Management: SCDs on (sequential compression devices)  Intervention: Prevent Infection  Recent Flowsheet Documentation  Taken 6/29/2024 2047 by Frederic Duque RN  Infection Prevention: hand hygiene promoted  Goal: Optimal Comfort and Wellbeing  6/30/2024 0407 by Frederic Duque RN  Outcome: Progressing  6/30/2024 0359 by Frederic Duque RN  Outcome: Progressing  Intervention: Monitor Pain and Promote Comfort  Recent Flowsheet Documentation  Taken 6/29/2024 1930 by Frederic Duque RN  Pain Management Interventions: declines  Goal: Readiness for Transition of Care  6/30/2024 0407 by Frederic Duque RN  Outcome: Progressing  6/30/2024 0359 by Frederic Duque RN  Outcome: Progressing     Problem: Bowel Disease, Inflammatory (Ulcerative Colitis or Crohn's Disease)  Goal: Optimal Adaptation to Chronic Illness  6/30/2024 0407 by Frederic Duque RN  Outcome: Progressing  6/30/2024 0359 by Frederic Duque RN  Outcome: Progressing  Goal: Absence of Infection Signs and Symptoms  6/30/2024 0407 by Frederic Duque RN  Outcome: Progressing  6/30/2024 0359 by Frederic Duque RN  Outcome: Progressing  Goal: Optimal Nutrition Delivery  6/30/2024 0407 by Frederic Duque RN  Outcome: Progressing  6/30/2024 0359 by Frederic Duque RN  Outcome: Progressing  Goal: Optimal Pain Control and Function  6/30/2024 0407 by Frederic Duque RN  Outcome: Progressing  6/30/2024 0359 by Frederic Duque RN  Outcome: Progressing  Intervention: Prevent or Manage Pain  Recent Flowsheet Documentation  Taken 6/29/2024 1930 by Frederic Duque RN  Pain Management Interventions: declines     Problem: Comorbidity Management  Goal: Blood Glucose Levels Within Targeted Range  6/30/2024 0407 by Frederic Duque RN  Outcome: Progressing  6/30/2024 0359 by Frederic Duque RN  Outcome: Progressing

## 2024-06-30 NOTE — PROGRESS NOTES
"[facility]   General Surgery Progress Note          Assessment and Plan:   Assessment:   POD#2 s/p diagnostic laparoscopy converted to laparotomy, lysis of adhesions and small bowel resection  Adhesive band in the pelvis causing complete SBO causing perforation  -Postoperative ileus as expected  -Intraoperative abdominal fluid cultures with klebsiella and aeromonas, pending sensitivities      Plan:   -Continue NPO and NGT, ice chips ok  -IV ABX: Zosyn, await culture results  -Pain Management: IV dilaudid  -IV fluids  -dvt proph: lovenox  -Increase activity as tolerated, sit up in chair and walk halls as able today  -Await return of bowel function             Interval History:   Up in chair,  feeling pretty well. Has been walking the halls. Pain managed. No flatus yet. Feels gurgling in stomach. Feels like hands are mildly puffy. Urinating normally.         Physical Exam:   Blood pressure (!) 153/61, pulse 85, temperature 98.7  F (37.1  C), temperature source Oral, resp. rate 16, height 1.6 m (5' 3\"), weight 89.1 kg (196 lb 6.9 oz), SpO2 94%.    I/O last 3 completed shifts:  In: 925 [P.O.:175; I.V.:750]  Out: 75 [Emesis/NG output:75]    Abdomen: soft, mildly distended, +mild diffuse tenderness  Inc(s) - incisions c/d/I, staples in place, no drainage or erythema  NG bilious output in tubing          Data:     Recent Labs   Lab Test 06/29/24  0637 06/28/24  0622 06/27/24  0714   HGB 10.9* 10.1* 10.2*   WBC 9.9 11.0 8.3     Recent Labs   Lab 06/30/24  0803 06/30/24  0207 06/29/24  2128 06/29/24  0637 06/28/24  1902 06/28/24  0622   *  --   --  131*  --  125*   POTASSIUM 3.6  --   --  3.8  --  3.7   CHLORIDE 96*  --   --  94*  --  89*   CO2 19*  --   --  21*  --  24   ANIONGAP 16*  --   --  16*  --  12   GLC 93 106* 113* 122*   < > 105*   BUN 6.1*  --   --  8.4  --  11.6   CR 0.54  --   --  0.64  --  0.65   GFRESTIMATED >90  --   --  >90  --  >90   ERICK 7.8*  --   --  7.7*  --  8.0*    < > = values in " this interval not displayed.          Quynh eLchuga MD

## 2024-06-30 NOTE — PROGRESS NOTES
"    [facility]     Hospitalist Progress Note     Assessment & Plan     ASSESSMENT    73F with hx of chronic pain, PHIL, and previous gastric bypass surgery presents with abdominal pain and found to have a small bowel obstruction potentially causing bowel ischemia and perforation and is now s/p ex lap with small bowel resection 06/28.    Cx from surgery polymicrobial but should be covered with Zosyn, awaiting return of bowel function.    PLAN    Small Bowel Obstruction Causing Bowel Ischemia  -Presents with abdominal pain and found to have a small bowel obstruction potentially causing bowel ischemia  -During surgery, evidence of adhesion causing obstruction, bowel ischemia, and perforation s/p bowel resection  -Polymicrobial surgical cx, with follow-up sensitivities  PLAN  -NPO w/ NGT in place, diet advancement per general surgery  -NS@75ml/hr  -Continue Zosyn  -Pain regimen in place    Other issues  -Chronic neuropathy: Continue home gabapentin  -Hyponatremia: Continue with gentle IV NS today  -PHIL and MDD: Home medications  -Pulmonary nodule: Follow-up with PCP as an outpatient  -Nocturnal hypoxia: Will refer for sleep study as an outpatient at discharge    DVT Prophy  -LMWH    Disposition  -Medically ready for discharge: Likely Monday depending on how diet advancement progresses      Renan Bradley MD    Subjective     Doing well, was up walking the halls this morning, sharpe removed.        Objective   Blood pressure 133/50, pulse 73, temperature 98.7  F (37.1  C), temperature source Oral, resp. rate 16, height 1.6 m (5' 3\"), weight 89.1 kg (196 lb 6.9 oz), SpO2 92%.    PHYSICAL EXAM  General: In no acute distress  CV: RRR.  Lungs: CTAB. Nl WOB.  Abd: Abdominal binder in place, mild tenderness throughout.  Ext: No edema.    LABS AND IMAGING  Reviewed and pertinent results discussed in assessment and plan.   "

## 2024-06-30 NOTE — PLAN OF CARE
"  Problem: Adult Inpatient Plan of Care  Goal: Plan of Care Review  Description: The Plan of Care Review/Shift note should be completed every shift.  The Outcome Evaluation is a brief statement about your assessment that the patient is improving, declining, or no change.  This information will be displayed automatically on your shift  note.  Outcome: Progressing  Flowsheets (Taken 6/30/2024 1747)  Outcome Evaluation: waiting bowel function  Plan of Care Reviewed With: patient  Overall Patient Progress: improvingo Do:  End of Shift Summary  For vital signs and complete assessments, please see documentation flowsheets.     Pertinent assessments:  A&O---  up in room and halls --- NG draining at LIS---- DSGs clean and intact -- voiding well---denies nausea and pain---using IS well---  Major Shift Events    Phos replaced per iv  Treatment Plan: monitor  Bedside Nurse: Miranda Santiago RN       Goal: Patient-Specific Goal (Individualized)  Description: You can add care plan individualizations to a care plan. Examples of Individualization might be:  \"Parent requests to be called daily at 9am for status\", \"I have a hard time hearing out of my right ear\", or \"Do not touch me to wake me up as it startles  me\".  Outcome: Progressing  Goal: Absence of Hospital-Acquired Illness or Injury  Outcome: Progressing  Intervention: Prevent and Manage VTE (Venous Thromboembolism) Risk  Recent Flowsheet Documentation  Taken 6/30/2024 1100 by Miranda Santiago RN  VTE Prevention/Management: patient refused intervention  Intervention: Prevent Infection  Recent Flowsheet Documentation  Taken 6/30/2024 1100 by Miranda Santiago RN  Infection Prevention: hand hygiene promoted  Goal: Optimal Comfort and Wellbeing  Outcome: Progressing  Intervention: Monitor Pain and Promote Comfort  Recent Flowsheet Documentation  Taken 6/30/2024 0816 by Miranda Santiago RN  Pain Management Interventions: medication (see MAR)  Goal: Readiness for Transition of " Care  Outcome: Progressing   Goal Outcome Evaluation:      Plan of Care Reviewed With: patient    Overall Patient Progress: improvingOverall Patient Progress: improving    Outcome Evaluation: waiting bowel function

## 2024-06-30 NOTE — DISCHARGE INSTRUCTIONS
HOME CARE FOLLOWING ABDOMINAL SURGERY  LUCINDA Johns, TWILA Roman C. Pratt, J. Shaheen    INCISIONAL CARE:  Replace the bandage over your incision (or incisions) until all drainage stops, or if more comfortable to have in place.  If present, leave the steri-strips (white paper tapes) in place for 14 days after surgery.  If you have staples in your incision at the time of discharge, they will be removed at your follow-up appointment.  If Dermabond (a type of skin glue) is present, leave in place until it wears/flakes off.     BATHING:  Avoid baths for 1 week after surgery.  Showers are okay.  You may wash your hair at any time.  Gently pat your incision dry after bathing.    ACTIVITY:  Light Activity -- you may immediately be up and about as tolerated.  Driving -- you may drive when comfortable and off narcotic pain medications.  Light Work -- resume when comfortable off pain medications.  (If you can drive, you probably can work.)  Strenuous Work/Activity -- limit lifting to 20 pounds for 4 weeks.  Then, progressively increase with time.  Active Sports (running, biking, etc.) -- cautiously resume after 6 weeks.    DISCOMFORT:  Use pain medications as prescribed by your surgeon.  Take the pain medication with some food, when possible, to minimize side effects.  Expect gradual improvement.    DIET:  Return to diet you were on before surgery, unless you are given specific diet instructions.  Drink plenty of fluids.  While taking pain medications, increase dietary fiber or add a fiber supplementation like Metamucil or Citrucel to help prevent constipation - a possible side effect of pain medications.    NAUSEA:  If nauseated from the anesthetic/pain meds; rest in bed, get up cautiously with assistance, and drink clear liquids (juice, tea, broth).    RETURN APPOINTMENT:  Schedule a follow-up visit 2-3 weeks after discharge from the hospital.  Office Phone:  [phone]     CONTACT US IF  THE FOLLOWING DEVELOPS:   1. A fever that is above 101     2. If there is a large amount of drainage, bleeding, or swelling.   3. Severe pain that is not relieved by your prescription.   4. Drainage that is thick, cloudy, yellow, green or white.   5. Any other questions not answered by  Frequently Asked Questions  sheet.      FREQUENTLY ASKED QUESTIONS:    Q:  How should my incision look?    A:  Normally your incision will appear slightly swollen with light redness directly along the incision itself as it heals.  It may feel like a bump or ridge as the healing/scarring happens, and over time (3-4 months) this bump or ridge feeling should slowly go away.  In general, clear or pink watery drainage can be normal at first as your incision heals, but should decrease over time.    Q:  How do I know if my incision is infected?  A:  Look at your incision for signs of infection, like redness around the incision spreading to surrounding skin, or drainage of cloudy or foul-smelling drainage.  If you feel warm, check your temperature to see if you are running a fever.    **If any of these things occur, please notify the nurse at our office.  We may need you to come into the office for an incision check.      Q:  How do I take care of my incision?  A:  If you have a dressing in place - Starting the day after surgery, replace the dressing 1-2 times a day until there is no further drainage from the incision.  At that time, a dressing is no longer needed.  Try to minimize tape on the skin if irritation is occurring at the tape sites.  If you have significant irritation from tape on the skin, please call the office to discuss other method of dressing your incision.    Small pieces of tape called  steri-strips  may be present directly overlying your incision; these may be removed 10 days after surgery unless otherwise specified by your surgeon.  If these tapes start to loosen at the ends, you may trim them back until they fall off or  are removed.    A:  If you had  Dermabond  tissue glue used as a dressing (this causes your incision to look shiny with a clear covering over it) - This type of dressing wears off with time and does not require more dressings over the top unless it is draining around the glue as it wears off.  Do not apply ointments or lotions over the incisions until the glue has completely worn off.    Q:  There is a piece of tape or a sticky  lead  still on my skin.  Can I remove this?  A:  Sometimes the sticky  leads  used for monitoring during surgery or for evaluation in the emergency department are not all removed while you are in the hospital.  These sometimes have a tab or metal dot on them.  You can easily remove these on your own, like taking off a band-aid.  If there is a gel substance under the  lead , simply wipe/clean it off with a washcloth or paper towel.      Q:  What can I do to minimize constipation (very hard stools, or lack of stools)?  A:  Stay well hydrated.  Increase your dietary fiber intake or take a fiber supplement -with plenty of water.  Walk around frequently.  You may consider an over-the-counter stool-softener.  Your Pharmacist can assist you with choosing one that is stocked at your pharmacy.  Constipation is also one of the most common side effects of pain medication.  If you are using pain medication, be pro-active and try to PREVENT problems with constipation by taking the steps above BEFORE constipation becomes a problem.    Q:  What do I do if I need more pain medications?  A:  Call the office to receive refills.  Be aware that certain pain meds cannot be called into a pharmacy and actually require a paper prescription.  A change may be made in your pain med as you progress thru your recovery period or if you have side effects to certain meds.    --Pain meds are NOT refilled after 5pm on weekdays, and NOT AT ALL on the weekends, so please look ahead to prevent problems.      Q:  Why am I having  a hard time sleeping now that I am at home?  A:  Many medications you receive while you are in the hospital can impact your sleep for a number of days after your surgery/hospitalization.  Decreased level of activity and naps during the day may also make sleeping at night difficult.  Try to minimize day-time naps, and get up frequently during the day to walk around your home during your recovery time.  Sleep aides may be of some help, but are not recommended for long-term use.      Q:  I am having some back discomfort.  What should I do?  A:  This may be related to certain positioning that was required for your surgery, extended periods of time in bed, or other changes in your overall activity level.  You may try ice, heat, acetaminophen, or ibuprofen to treat this temporarily.  Note that many pain medications have acetaminophen in them and would state this on the prescription bottle.  Be sure not to exceed the maximum of 4000mg per day of acetaminophen.     **If the pain you are having does not resolve, is severe, or is a flare of back pain you have had on other occasions prior to surgery, please contact your primary physician for further recommendations or for an appointment to be examined at their office.    Q:  Why am I having headaches?  A:  Headaches can be caused by many things:  caffeine withdrawal, use of pain meds, dehydration, high blood pressure, lack of sleep, over-activity/exhaustion, flare-up of usual migraine headaches.  If you feel this is related to muscle tension (a band-like feeling around the head, or a pressure at the low-back of the head) you may try ice or heat to this area.  You may need to drink more fluids (try electrolyte drink like Gatorade), rest, or take your usual migraine medications.   **If your headaches do not resolve, worsen, are accompanied by other symptoms, or if your blood pressure is high, please call your primary physician for recommendation and/or examination.    Q:  I am  unable to urinate.  What do I do?  A:  A small percentage of people can have difficulty urinating initially after surgery.  This includes being able to urinate only a very small amount at a time and feeling discomfort or pressure in the very low abdomen.  This is called  urinary retention , and is actually an urgent situation.  Proceed to your nearest Emergency department for evaluation (not an Urgent Care Center).  Sometimes the bladder does not work correctly after certain medications you receive during surgery, or related to certain procedures.  You may need to have a catheter placed until your bladder recovers.  When planning to go to an Emergency department, it may help to call the ER to let them know you are coming in for this problem after a surgery.  This may help you get in quicker to be evaluated.  **If you have symptoms of a urinary tract infection, please contact your primary physician for the proper evaluation and treatment.          If you have other questions, please call the office Monday thru Friday between 8am and 4:30pm to discuss with the nurse or physician assistant.  #[phone]    There is a surgeon ON CALL on weekday evenings and over the weekend in case of urgent need only, and may be contacted at the same number.    If you are having an emergency, call 911 or proceed to your nearest emergency department.
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
## 2024-07-01 ENCOUNTER — APPOINTMENT (OUTPATIENT)
Dept: PHYSICAL THERAPY | Facility: CLINIC | Age: 74
DRG: 329 | End: 2024-07-01
Payer: MEDICARE

## 2024-07-01 LAB
ANION GAP SERPL CALCULATED.3IONS-SCNC: 15 MMOL/L (ref 7–15)
BUN SERPL-MCNC: 6.1 MG/DL (ref 8–23)
CALCIUM SERPL-MCNC: 7.7 MG/DL (ref 8.8–10.2)
CHLORIDE SERPL-SCNC: 99 MMOL/L (ref 98–107)
CREAT SERPL-MCNC: 0.6 MG/DL (ref 0.51–0.95)
DEPRECATED HCO3 PLAS-SCNC: 20 MMOL/L (ref 22–29)
EGFRCR SERPLBLD CKD-EPI 2021: >90 ML/MIN/1.73M2
ERYTHROCYTE [DISTWIDTH] IN BLOOD BY AUTOMATED COUNT: 13.8 % (ref 10–15)
GLUCOSE BLDC GLUCOMTR-MCNC: 95 MG/DL (ref 70–99)
GLUCOSE SERPL-MCNC: 96 MG/DL (ref 70–99)
HCT VFR BLD AUTO: 29.2 % (ref 35–47)
HGB BLD-MCNC: 9.3 G/DL (ref 11.7–15.7)
MAGNESIUM SERPL-MCNC: 2.1 MG/DL (ref 1.7–2.3)
MCH RBC QN AUTO: 29.6 PG (ref 26.5–33)
MCHC RBC AUTO-ENTMCNC: 31.8 G/DL (ref 31.5–36.5)
MCV RBC AUTO: 93 FL (ref 78–100)
PATH REPORT.COMMENTS IMP SPEC: NORMAL
PATH REPORT.COMMENTS IMP SPEC: NORMAL
PATH REPORT.FINAL DX SPEC: NORMAL
PATH REPORT.GROSS SPEC: NORMAL
PATH REPORT.MICROSCOPIC SPEC OTHER STN: NORMAL
PATH REPORT.RELEVANT HX SPEC: NORMAL
PHOSPHATE SERPL-MCNC: 2.5 MG/DL (ref 2.5–4.5)
PHOTO IMAGE: NORMAL
PLATELET # BLD AUTO: 450 10E3/UL (ref 150–450)
POTASSIUM SERPL-SCNC: 3 MMOL/L (ref 3.4–5.3)
POTASSIUM SERPL-SCNC: 3.5 MMOL/L (ref 3.4–5.3)
RBC # BLD AUTO: 3.14 10E6/UL (ref 3.8–5.2)
SODIUM SERPL-SCNC: 134 MMOL/L (ref 135–145)
WBC # BLD AUTO: 12 10E3/UL (ref 4–11)

## 2024-07-01 PROCEDURE — 97116 GAIT TRAINING THERAPY: CPT | Mod: GP

## 2024-07-01 PROCEDURE — 99232 SBSQ HOSP IP/OBS MODERATE 35: CPT | Performed by: INTERNAL MEDICINE

## 2024-07-01 PROCEDURE — 36415 COLL VENOUS BLD VENIPUNCTURE: CPT | Performed by: INTERNAL MEDICINE

## 2024-07-01 PROCEDURE — 250N000013 HC RX MED GY IP 250 OP 250 PS 637: Performed by: STUDENT IN AN ORGANIZED HEALTH CARE EDUCATION/TRAINING PROGRAM

## 2024-07-01 PROCEDURE — 120N000001 HC R&B MED SURG/OB

## 2024-07-01 PROCEDURE — 85014 HEMATOCRIT: CPT | Performed by: STUDENT IN AN ORGANIZED HEALTH CARE EDUCATION/TRAINING PROGRAM

## 2024-07-01 PROCEDURE — 250N000011 HC RX IP 250 OP 636: Performed by: STUDENT IN AN ORGANIZED HEALTH CARE EDUCATION/TRAINING PROGRAM

## 2024-07-01 PROCEDURE — 84132 ASSAY OF SERUM POTASSIUM: CPT | Performed by: INTERNAL MEDICINE

## 2024-07-01 PROCEDURE — 84100 ASSAY OF PHOSPHORUS: CPT | Performed by: INTERNAL MEDICINE

## 2024-07-01 PROCEDURE — 97530 THERAPEUTIC ACTIVITIES: CPT | Mod: GP

## 2024-07-01 PROCEDURE — 36415 COLL VENOUS BLD VENIPUNCTURE: CPT | Performed by: STUDENT IN AN ORGANIZED HEALTH CARE EDUCATION/TRAINING PROGRAM

## 2024-07-01 PROCEDURE — 83735 ASSAY OF MAGNESIUM: CPT | Performed by: INTERNAL MEDICINE

## 2024-07-01 PROCEDURE — 250N000013 HC RX MED GY IP 250 OP 250 PS 637: Performed by: INTERNAL MEDICINE

## 2024-07-01 PROCEDURE — 258N000003 HC RX IP 258 OP 636: Performed by: INTERNAL MEDICINE

## 2024-07-01 PROCEDURE — 250N000011 HC RX IP 250 OP 636: Performed by: INTERNAL MEDICINE

## 2024-07-01 PROCEDURE — 80048 BASIC METABOLIC PNL TOTAL CA: CPT | Performed by: STUDENT IN AN ORGANIZED HEALTH CARE EDUCATION/TRAINING PROGRAM

## 2024-07-01 RX ORDER — POTASSIUM CHLORIDE 20MEQ/15ML
40 LIQUID (ML) ORAL ONCE
Status: COMPLETED | OUTPATIENT
Start: 2024-07-01 | End: 2024-07-01

## 2024-07-01 RX ORDER — POTASSIUM CHLORIDE 20MEQ/15ML
20 LIQUID (ML) ORAL ONCE
Status: COMPLETED | OUTPATIENT
Start: 2024-07-01 | End: 2024-07-01

## 2024-07-01 RX ORDER — CIPROFLOXACIN 2 MG/ML
400 INJECTION, SOLUTION INTRAVENOUS EVERY 12 HOURS
Status: DISCONTINUED | OUTPATIENT
Start: 2024-07-01 | End: 2024-07-02

## 2024-07-01 RX ADMIN — ACETAMINOPHEN 975 MG: 160 SOLUTION ORAL at 09:30

## 2024-07-01 RX ADMIN — POTASSIUM CHLORIDE 20 MEQ: 20 SOLUTION ORAL at 14:10

## 2024-07-01 RX ADMIN — GABAPENTIN 900 MG: 300 CAPSULE ORAL at 14:09

## 2024-07-01 RX ADMIN — SERTRALINE 100 MG: 100 TABLET, FILM COATED ORAL at 09:30

## 2024-07-01 RX ADMIN — PIPERACILLIN AND TAZOBACTAM 3.38 G: 3; .375 INJECTION, POWDER, FOR SOLUTION INTRAVENOUS at 09:30

## 2024-07-01 RX ADMIN — PIPERACILLIN AND TAZOBACTAM 3.38 G: 3; .375 INJECTION, POWDER, FOR SOLUTION INTRAVENOUS at 14:09

## 2024-07-01 RX ADMIN — CIPROFLOXACIN 400 MG: 2 INJECTION, SOLUTION INTRAVENOUS at 10:39

## 2024-07-01 RX ADMIN — POTASSIUM CHLORIDE 40 MEQ: 20 SOLUTION ORAL at 11:46

## 2024-07-01 RX ADMIN — PIPERACILLIN AND TAZOBACTAM 3.38 G: 3; .375 INJECTION, POWDER, FOR SOLUTION INTRAVENOUS at 03:20

## 2024-07-01 RX ADMIN — Medication 1 CAPSULE: at 20:31

## 2024-07-01 RX ADMIN — CIPROFLOXACIN 400 MG: 2 INJECTION, SOLUTION INTRAVENOUS at 21:25

## 2024-07-01 RX ADMIN — DICYCLOMINE HYDROCHLORIDE 10 MG: 10 CAPSULE ORAL at 11:46

## 2024-07-01 RX ADMIN — ACETAMINOPHEN 975 MG: 160 SOLUTION ORAL at 04:21

## 2024-07-01 RX ADMIN — GABAPENTIN 900 MG: 300 CAPSULE ORAL at 20:31

## 2024-07-01 RX ADMIN — ACETAMINOPHEN 975 MG: 160 SOLUTION ORAL at 17:23

## 2024-07-01 RX ADMIN — SODIUM CHLORIDE: 9 INJECTION, SOLUTION INTRAVENOUS at 17:34

## 2024-07-01 RX ADMIN — DICYCLOMINE HYDROCHLORIDE 10 MG: 10 CAPSULE ORAL at 20:31

## 2024-07-01 RX ADMIN — GABAPENTIN 900 MG: 300 CAPSULE ORAL at 09:30

## 2024-07-01 RX ADMIN — ENOXAPARIN SODIUM 40 MG: 40 INJECTION SUBCUTANEOUS at 17:20

## 2024-07-01 RX ADMIN — DICYCLOMINE HYDROCHLORIDE 10 MG: 10 CAPSULE ORAL at 09:30

## 2024-07-01 RX ADMIN — Medication 1 CAPSULE: at 09:30

## 2024-07-01 RX ADMIN — DICYCLOMINE HYDROCHLORIDE 10 MG: 10 CAPSULE ORAL at 17:23

## 2024-07-01 RX ADMIN — PIPERACILLIN AND TAZOBACTAM 3.38 G: 3; .375 INJECTION, POWDER, FOR SOLUTION INTRAVENOUS at 20:30

## 2024-07-01 ASSESSMENT — ACTIVITIES OF DAILY LIVING (ADL)
ADLS_ACUITY_SCORE: 32
ADLS_ACUITY_SCORE: 32
ADLS_ACUITY_SCORE: 28
ADLS_ACUITY_SCORE: 32
ADLS_ACUITY_SCORE: 31
ADLS_ACUITY_SCORE: 31
ADLS_ACUITY_SCORE: 32
ADLS_ACUITY_SCORE: 32
ADLS_ACUITY_SCORE: 28
ADLS_ACUITY_SCORE: 32
ADLS_ACUITY_SCORE: 32
ADLS_ACUITY_SCORE: 28
ADLS_ACUITY_SCORE: 32
ADLS_ACUITY_SCORE: 32
ADLS_ACUITY_SCORE: 28
ADLS_ACUITY_SCORE: 28
ADLS_ACUITY_SCORE: 31
ADLS_ACUITY_SCORE: 28
ADLS_ACUITY_SCORE: 32
ADLS_ACUITY_SCORE: 32
ADLS_ACUITY_SCORE: 28
ADLS_ACUITY_SCORE: 31
ADLS_ACUITY_SCORE: 32

## 2024-07-01 NOTE — PROGRESS NOTES
"[facility]   General Surgery Progress Note          Assessment and Plan:   Assessment:   POD#3 s/p diagnostic laparoscopy converted to laparotomy, lysis of adhesions and small bowel resection  Adhesive band in the pelvis causing complete SBO causing perforation  -Postoperative ileus as expected - resolving (+BMs)  -Intraoperative abdominal fluid cultures with klebsiella and aeromonas, pending sensitivities      Plan:   -Remove NGT and start clear liquids  -IV ABX: Zosyn, await culture results  -Pain Management: IV dilaudid  -IV fluids  -DVT prophylaxis: lovenox  -Increase activity as tolerated, sit up in chair and walk halls as able today           Interval History:   Resting in bed, doing well. She is happy to report that she has had several loose BMs. NGT in place and NPO. She denies pain but has some soreness when getting out of bed as expected. She is walking the halls when able. She is hoping to get the NGT removed.         Physical Exam:   Blood pressure (!) 150/49, pulse 73, temperature 98.4  F (36.9  C), temperature source Oral, resp. rate 18, height 1.6 m (5' 3\"), weight 92 kg (202 lb 13.2 oz), SpO2 98%.    I/O last 3 completed shifts:  In: 1975 [P.O.:350; I.V.:1600; NG/GT:25]  Out: 75 [Emesis/NG output:75]    Abdomen: soft, mildly distended, +mild diffuse tenderness  Inc(s) - incisions c/d/I, staples in place, no drainage or erythema  NG scant bilious output in tubing          Data:     Recent Labs   Lab 07/01/24  0726 06/30/24  0803 06/29/24  0637   WBC 12.0* 12.9* 9.9   HGB 9.3* 9.8* 10.9*   HCT 29.2* 31.0* 33.0*   MCV 93 93 91    434 357         Recent Labs   Lab 07/01/24  0726 07/01/24  0700 06/30/24 2210 06/30/24  0803 06/29/24 2128 06/29/24  0637   *  --   --  131*  --  131*   POTASSIUM 3.0*  --   --  3.6  --  3.8   CHLORIDE 99  --   --  96*  --  94*   CO2 20*  --   --  19*  --  21*   ANIONGAP 15  --   --  16*  --  16*   GLC 96 95 99 93   < > 122*   BUN 6.1*  --   --  " 6.1*  --  8.4   CR 0.60  --   --  0.54  --  0.64   GFRESTIMATED >90  --   --  >90  --  >90   ERICK 7.7*  --   --  7.8*  --  7.7*    < > = values in this interval not displayed.          Nir Woodard PA-C  Text page [phone] 8am-4pm  After 4pm call [phone]    Seen and agree,    Artem Jeffery MD  [facility]

## 2024-07-01 NOTE — PLAN OF CARE
"Pertinent assessments: A&O, up w/ ax1 belt and walker. Denies pain sob or nausea. Cu growing polymicrobs, added Cipra to ABX tx. K replaced po.     Major Shift Events NG removed, tolerating clears.     Treatment Plan: Cont IV ABX. Monitoring diet advancement.     Bedside Nurse: Tahira Doe RN       Problem: Adult Inpatient Plan of Care  Goal: Plan of Care Review  Description: The Plan of Care Review/Shift note should be completed every shift.  The Outcome Evaluation is a brief statement about your assessment that the patient is improving, declining, or no change.  This information will be displayed automatically on your shift  note.  7/1/2024 1449 by Tahira Doe RN  Outcome: Progressing  7/1/2024 1246 by Tahira Doe RN  Outcome: Progressing  Flowsheets (Taken 7/1/2024 1246)  Outcome Evaluation: Tolerate diet advancement. Bowel function returning  Plan of Care Reviewed With: patient  Overall Patient Progress: improving  Goal: Patient-Specific Goal (Individualized)  Description: You can add care plan individualizations to a care plan. Examples of Individualization might be:  \"Parent requests to be called daily at 9am for status\", \"I have a hard time hearing out of my right ear\", or \"Do not touch me to wake me up as it startles  me\".  7/1/2024 1449 by Tahira Doe RN  Outcome: Progressing  7/1/2024 1246 by Tahira Doe RN  Outcome: Progressing  Goal: Absence of Hospital-Acquired Illness or Injury  7/1/2024 1449 by Tahira Doe RN  Outcome: Progressing  7/1/2024 1246 by Tahira Doe RN  Outcome: Progressing  Intervention: Identify and Manage Fall Risk  Recent Flowsheet Documentation  Taken 7/1/2024 1200 by Tahira Doe RN  Safety Promotion/Fall Prevention:   activity supervised   increased rounding and observation   nonskid shoes/slippers when out of bed   safety round/check completed  Intervention: Prevent and Manage VTE (Venous Thromboembolism) Risk  Recent Flowsheet " Documentation  Taken 7/1/2024 1200 by Tahira Doe RN  VTE Prevention/Management: SCDs off (sequential compression devices)  Intervention: Prevent Infection  Recent Flowsheet Documentation  Taken 7/1/2024 1200 by Tahira Doe RN  Infection Prevention: hand hygiene promoted  Goal: Optimal Comfort and Wellbeing  7/1/2024 1449 by Tahira Doe RN  Outcome: Progressing  7/1/2024 1246 by Tahira Doe RN  Outcome: Progressing  Goal: Readiness for Transition of Care  7/1/2024 1449 by Tahira Doe RN  Outcome: Progressing  7/1/2024 1246 by Tahira Doe RN  Outcome: Progressing     Problem: Bowel Disease, Inflammatory (Ulcerative Colitis or Crohn's Disease)  Goal: Optimal Adaptation to Chronic Illness  7/1/2024 1449 by Tahira Doe RN  Outcome: Progressing  7/1/2024 1246 by Tahira Doe RN  Outcome: Progressing  Goal: Absence of Infection Signs and Symptoms  7/1/2024 1449 by Tahira Doe RN  Outcome: Progressing  7/1/2024 1246 by Tahira Doe RN  Outcome: Progressing  Goal: Optimal Nutrition Delivery  7/1/2024 1449 by Tahira Doe RN  Outcome: Progressing  7/1/2024 1246 by Tahira Doe RN  Outcome: Progressing  Goal: Optimal Pain Control and Function  7/1/2024 1449 by Tahira Doe RN  Outcome: Progressing  7/1/2024 1246 by Tahira Doe RN  Outcome: Progressing     Problem: Comorbidity Management  Goal: Blood Glucose Levels Within Targeted Range  7/1/2024 1449 by Tahira Doe RN  Outcome: Progressing  7/1/2024 1246 by Tahira Doe RN  Outcome: Progressing  Intervention: Monitor and Manage Glycemia  Recent Flowsheet Documentation  Taken 7/1/2024 1200 by Tahira Doe RN  Medication Review/Management: medications reviewed   Goal Outcome Evaluation:      Plan of Care Reviewed With: patient    Overall Patient Progress: improvingOverall Patient Progress: improving    Outcome Evaluation: Tolerate diet advancement. Bowel function  returning

## 2024-07-01 NOTE — PROGRESS NOTES
[facility]     Hospitalist Progress Note     Assessment & Plan     ASSESSMENT    73F with hx of chronic pain, PHIL, and previous gastric bypass surgery presents with abdominal pain and found to have a small bowel obstruction potentially causing bowel ischemia and perforation and is now s/p ex lap with small bowel resection 06/28.    Cx from surgery polymicrobial, E. coli resistant to Zosyn, no single agent to cover all of these organisms so we will start concurrent ciprofloxacin.  Likely can get NG tube removed today but will defer to surgery.    PLAN    Small Bowel Obstruction Causing Bowel Ischemia  -Presents with abdominal pain and found to have a small bowel obstruction potentially causing bowel ischemia  -During surgery, evidence of adhesion causing obstruction, bowel ischemia, and perforation s/p bowel resection  -Polymicrobial surgical cx, with follow-up sensitivities  PLAN  -NPO w/ NGT in place, diet advancement per general surgery  -NS@75ml/hr  -Continue Zosyn  -Pain regimen in place    Peritonitis  -Due to bowel perforation  -Cx from surgery polymicrobial, originally on Zosyn  -E. coli resistant to Zosyn, no single agent to cover all of these organisms so we will start concurrent ciprofloxacin  PLAN  -Zosyn 3.375 g every 6 hours  -Ciprofloxacin 400 mg twice daily  -Follow-up finalized cultures    Other issues  -Chronic neuropathy: Continue home gabapentin  -Hyponatremia: Continue with gentle IV NS today  -PHIL and MDD: Home medications  -Pulmonary nodule: Follow-up with PCP as an outpatient  -Nocturnal hypoxia: Will refer for sleep study as an outpatient at discharge    DVT Prophy  -LMWH    Disposition  -Medically ready for discharge: 2 to 3 days to home      Renan Bradley MD    Subjective     Patient seen at bedside.  Now having bowel movements.  Denies abdominal pain or nausea.  Wondering if she can have NG tube taken out.        Objective   Blood pressure (!) 150/49, pulse 73,  "temperature 98.4  F (36.9  C), temperature source Oral, resp. rate 18, height 1.6 m (5' 3\"), weight 92 kg (202 lb 13.2 oz), SpO2 98%.    PHYSICAL EXAM  General: In no acute distress.  NG tube in place  CV: RRR.  Lungs: CTAB. Nl WOB.  Abd: Abdominal binder in place, mild tenderness throughout.  Ext: No edema.    LABS AND IMAGING  Reviewed and pertinent results discussed in assessment and plan.   "

## 2024-07-01 NOTE — PLAN OF CARE
"Goal Outcome Evaluation:      Plan of Care Reviewed With: patient    Overall Patient Progress: improvingOverall Patient Progress: improving    Outcome Evaluation: Pt ambulated in the hallway, scheduled Tylenol effective for pain management, NPO, Zosyn abx & NS ivf, discharge pending return of bowel function.  NG on LIS, barely any NG output during shift, mid incision cdi staples in place.      Problem: Adult Inpatient Plan of Care  Goal: Plan of Care Review  Description: The Plan of Care Review/Shift note should be completed every shift.  The Outcome Evaluation is a brief statement about your assessment that the patient is improving, declining, or no change.  This information will be displayed automatically on your shift  note.  Outcome: Progressing  Flowsheets (Taken 7/1/2024 0132)  Outcome Evaluation: Pt ambulated in the hallway, scheduled Tylenol effective for pain management, NPO, Zosyn abx & NS ivf, discharge pending return of bowel function.  Plan of Care Reviewed With: patient  Overall Patient Progress: improving  Goal: Patient-Specific Goal (Individualized)  Description: You can add care plan individualizations to a care plan. Examples of Individualization might be:  \"Parent requests to be called daily at 9am for status\", \"I have a hard time hearing out of my right ear\", or \"Do not touch me to wake me up as it startles  me\".  Outcome: Progressing  Goal: Absence of Hospital-Acquired Illness or Injury  Outcome: Progressing  Intervention: Identify and Manage Fall Risk  Recent Flowsheet Documentation  Taken 6/30/2024 2101 by Frederic Duque, RN  Safety Promotion/Fall Prevention: clutter free environment maintained  Intervention: Prevent and Manage VTE (Venous Thromboembolism) Risk  Recent Flowsheet Documentation  Taken 6/30/2024 2101 by Frederic Duque, RN  VTE Prevention/Management: SCDs on (sequential compression devices)  Intervention: Prevent Infection  Recent Flowsheet Documentation  Taken 6/30/2024 2101 by " Frederic Duque, RN  Infection Prevention: hand hygiene promoted  Goal: Optimal Comfort and Wellbeing  Outcome: Progressing  Goal: Readiness for Transition of Care  Outcome: Progressing     Problem: Bowel Disease, Inflammatory (Ulcerative Colitis or Crohn's Disease)  Goal: Optimal Adaptation to Chronic Illness  Outcome: Progressing  Goal: Absence of Infection Signs and Symptoms  Outcome: Progressing  Goal: Optimal Nutrition Delivery  Outcome: Progressing  Goal: Optimal Pain Control and Function  Outcome: Progressing     Problem: Comorbidity Management  Goal: Blood Glucose Levels Within Targeted Range  Outcome: Progressing      PRE-OP DIAGNOSIS:  Left ovarian cyst 14-Aug-2020 13:28:53  Jacoby Zheng  Abnormal uterine bleeding 14-Aug-2020 13:26:44  Jacoby Zheng  Pain pelvic 14-Aug-2020 13:29:02  Jacoby Zheng

## 2024-07-02 ENCOUNTER — APPOINTMENT (OUTPATIENT)
Dept: PHYSICAL THERAPY | Facility: CLINIC | Age: 74
DRG: 329 | End: 2024-07-02
Payer: MEDICARE

## 2024-07-02 LAB
ANION GAP SERPL CALCULATED.3IONS-SCNC: 12 MMOL/L (ref 7–15)
BACTERIA PRT CULT: ABNORMAL
BUN SERPL-MCNC: 2.3 MG/DL (ref 8–23)
C DIFF TOX B STL QL: NEGATIVE
CALCIUM SERPL-MCNC: 7.5 MG/DL (ref 8.8–10.2)
CHLORIDE SERPL-SCNC: 100 MMOL/L (ref 98–107)
CREAT SERPL-MCNC: 0.58 MG/DL (ref 0.51–0.95)
DEPRECATED HCO3 PLAS-SCNC: 22 MMOL/L (ref 22–29)
EGFRCR SERPLBLD CKD-EPI 2021: >90 ML/MIN/1.73M2
ERYTHROCYTE [DISTWIDTH] IN BLOOD BY AUTOMATED COUNT: 13.4 % (ref 10–15)
GLUCOSE SERPL-MCNC: 104 MG/DL (ref 70–99)
HCT VFR BLD AUTO: 27.5 % (ref 35–47)
HGB BLD-MCNC: 9.3 G/DL (ref 11.7–15.7)
MCH RBC QN AUTO: 30.1 PG (ref 26.5–33)
MCHC RBC AUTO-ENTMCNC: 33.8 G/DL (ref 31.5–36.5)
MCV RBC AUTO: 89 FL (ref 78–100)
PHOSPHATE SERPL-MCNC: 2 MG/DL (ref 2.5–4.5)
PHOSPHATE SERPL-MCNC: 2.9 MG/DL (ref 2.5–4.5)
PLATELET # BLD AUTO: 450 10E3/UL (ref 150–450)
POTASSIUM SERPL-SCNC: 3 MMOL/L (ref 3.4–5.3)
POTASSIUM SERPL-SCNC: 3.8 MMOL/L (ref 3.4–5.3)
POTASSIUM SERPL-SCNC: 4.2 MMOL/L (ref 3.4–5.3)
RBC # BLD AUTO: 3.09 10E6/UL (ref 3.8–5.2)
SODIUM SERPL-SCNC: 134 MMOL/L (ref 135–145)
WBC # BLD AUTO: 11.2 10E3/UL (ref 4–11)

## 2024-07-02 PROCEDURE — 250N000009 HC RX 250: Performed by: STUDENT IN AN ORGANIZED HEALTH CARE EDUCATION/TRAINING PROGRAM

## 2024-07-02 PROCEDURE — 99232 SBSQ HOSP IP/OBS MODERATE 35: CPT | Performed by: INTERNAL MEDICINE

## 2024-07-02 PROCEDURE — 97530 THERAPEUTIC ACTIVITIES: CPT | Mod: GP

## 2024-07-02 PROCEDURE — 120N000001 HC R&B MED SURG/OB

## 2024-07-02 PROCEDURE — 97116 GAIT TRAINING THERAPY: CPT | Mod: GP

## 2024-07-02 PROCEDURE — 84132 ASSAY OF SERUM POTASSIUM: CPT | Performed by: INTERNAL MEDICINE

## 2024-07-02 PROCEDURE — 87493 C DIFF AMPLIFIED PROBE: CPT | Performed by: PHYSICIAN ASSISTANT

## 2024-07-02 PROCEDURE — 250N000011 HC RX IP 250 OP 636: Performed by: STUDENT IN AN ORGANIZED HEALTH CARE EDUCATION/TRAINING PROGRAM

## 2024-07-02 PROCEDURE — 36415 COLL VENOUS BLD VENIPUNCTURE: CPT | Performed by: INTERNAL MEDICINE

## 2024-07-02 PROCEDURE — 36415 COLL VENOUS BLD VENIPUNCTURE: CPT | Performed by: STUDENT IN AN ORGANIZED HEALTH CARE EDUCATION/TRAINING PROGRAM

## 2024-07-02 PROCEDURE — 250N000013 HC RX MED GY IP 250 OP 250 PS 637: Performed by: STUDENT IN AN ORGANIZED HEALTH CARE EDUCATION/TRAINING PROGRAM

## 2024-07-02 PROCEDURE — 85014 HEMATOCRIT: CPT | Performed by: STUDENT IN AN ORGANIZED HEALTH CARE EDUCATION/TRAINING PROGRAM

## 2024-07-02 PROCEDURE — 250N000013 HC RX MED GY IP 250 OP 250 PS 637: Performed by: INTERNAL MEDICINE

## 2024-07-02 PROCEDURE — 80048 BASIC METABOLIC PNL TOTAL CA: CPT | Performed by: STUDENT IN AN ORGANIZED HEALTH CARE EDUCATION/TRAINING PROGRAM

## 2024-07-02 PROCEDURE — 250N000011 HC RX IP 250 OP 636: Performed by: INTERNAL MEDICINE

## 2024-07-02 PROCEDURE — 84100 ASSAY OF PHOSPHORUS: CPT | Performed by: INTERNAL MEDICINE

## 2024-07-02 RX ORDER — POTASSIUM CHLORIDE 1500 MG/1
40 TABLET, EXTENDED RELEASE ORAL ONCE
Status: COMPLETED | OUTPATIENT
Start: 2024-07-02 | End: 2024-07-02

## 2024-07-02 RX ORDER — CIPROFLOXACIN 500 MG/1
500 TABLET, FILM COATED ORAL EVERY 12 HOURS SCHEDULED
Status: DISCONTINUED | OUTPATIENT
Start: 2024-07-02 | End: 2024-07-05

## 2024-07-02 RX ORDER — HYDROXYZINE HYDROCHLORIDE 50 MG/1
50 TABLET, FILM COATED ORAL EVERY 6 HOURS PRN
Status: DISCONTINUED | OUTPATIENT
Start: 2024-07-02 | End: 2024-07-06 | Stop reason: HOSPADM

## 2024-07-02 RX ORDER — POTASSIUM CHLORIDE 1500 MG/1
20 TABLET, EXTENDED RELEASE ORAL ONCE
Status: COMPLETED | OUTPATIENT
Start: 2024-07-02 | End: 2024-07-02

## 2024-07-02 RX ADMIN — CIPROFLOXACIN HYDROCHLORIDE 500 MG: 500 TABLET, FILM COATED ORAL at 20:06

## 2024-07-02 RX ADMIN — SERTRALINE 100 MG: 100 TABLET, FILM COATED ORAL at 09:38

## 2024-07-02 RX ADMIN — GABAPENTIN 900 MG: 300 CAPSULE ORAL at 13:48

## 2024-07-02 RX ADMIN — ENOXAPARIN SODIUM 40 MG: 40 INJECTION SUBCUTANEOUS at 15:12

## 2024-07-02 RX ADMIN — GABAPENTIN 900 MG: 300 CAPSULE ORAL at 09:42

## 2024-07-02 RX ADMIN — ACETAMINOPHEN 975 MG: 160 SOLUTION ORAL at 20:06

## 2024-07-02 RX ADMIN — ACETAMINOPHEN 975 MG: 160 SOLUTION ORAL at 09:44

## 2024-07-02 RX ADMIN — AMOXICILLIN AND CLAVULANATE POTASSIUM 1 TABLET: 875; 125 TABLET, FILM COATED ORAL at 20:06

## 2024-07-02 RX ADMIN — POTASSIUM & SODIUM PHOSPHATES POWDER PACK 280-160-250 MG 1 PACKET: 280-160-250 PACK at 15:11

## 2024-07-02 RX ADMIN — CIPROFLOXACIN 400 MG: 2 INJECTION, SOLUTION INTRAVENOUS at 09:40

## 2024-07-02 RX ADMIN — ACETAMINOPHEN 975 MG: 160 SOLUTION ORAL at 03:13

## 2024-07-02 RX ADMIN — DICYCLOMINE HYDROCHLORIDE 10 MG: 10 CAPSULE ORAL at 20:06

## 2024-07-02 RX ADMIN — Medication 1 CAPSULE: at 20:06

## 2024-07-02 RX ADMIN — LIDOCAINE: 40 CREAM TOPICAL at 21:47

## 2024-07-02 RX ADMIN — HYDROXYZINE HYDROCHLORIDE 50 MG: 50 TABLET, FILM COATED ORAL at 09:48

## 2024-07-02 RX ADMIN — Medication 1 CAPSULE: at 09:36

## 2024-07-02 RX ADMIN — HYDROXYZINE HYDROCHLORIDE 50 MG: 50 TABLET, FILM COATED ORAL at 01:56

## 2024-07-02 RX ADMIN — PIPERACILLIN AND TAZOBACTAM 3.38 G: 3; .375 INJECTION, POWDER, FOR SOLUTION INTRAVENOUS at 01:56

## 2024-07-02 RX ADMIN — POTASSIUM & SODIUM PHOSPHATES POWDER PACK 280-160-250 MG 1 PACKET: 280-160-250 PACK at 09:36

## 2024-07-02 RX ADMIN — GABAPENTIN 900 MG: 300 CAPSULE ORAL at 20:06

## 2024-07-02 RX ADMIN — POTASSIUM CHLORIDE 20 MEQ: 1500 TABLET, EXTENDED RELEASE ORAL at 09:38

## 2024-07-02 RX ADMIN — ACETAMINOPHEN 975 MG: 160 SOLUTION ORAL at 15:18

## 2024-07-02 RX ADMIN — PIPERACILLIN AND TAZOBACTAM 3.38 G: 3; .375 INJECTION, POWDER, FOR SOLUTION INTRAVENOUS at 09:49

## 2024-07-02 RX ADMIN — POTASSIUM & SODIUM PHOSPHATES POWDER PACK 280-160-250 MG 1 PACKET: 280-160-250 PACK at 13:47

## 2024-07-02 RX ADMIN — AMOXICILLIN AND CLAVULANATE POTASSIUM 1 TABLET: 875; 125 TABLET, FILM COATED ORAL at 13:47

## 2024-07-02 RX ADMIN — POTASSIUM CHLORIDE 40 MEQ: 1500 TABLET, EXTENDED RELEASE ORAL at 09:37

## 2024-07-02 RX ADMIN — HYDROXYZINE HYDROCHLORIDE 50 MG: 50 TABLET, FILM COATED ORAL at 18:08

## 2024-07-02 ASSESSMENT — ACTIVITIES OF DAILY LIVING (ADL)
ADLS_ACUITY_SCORE: 31

## 2024-07-02 NOTE — PLAN OF CARE
"Assessments:   A&Ox3. Up with Ax1 with walker. Multiple loose bm during shift around 4-5x. Abdominal incision clean dry and intact. Has abdominal binder in place. Tolerated clear liquids.     Treatment Plan: Cont Zosyn and Cipro, IVF, encourage ambulation, Surgery following    Bedside Nurse: Clayton Gilliland RN       Problem: Adult Inpatient Plan of Care  Goal: Plan of Care Review  Description: The Plan of Care Review/Shift note should be completed every shift.  The Outcome Evaluation is a brief statement about your assessment that the patient is improving, declining, or no change.  This information will be displayed automatically on your shift  note.  Outcome: Progressing  Flowsheets (Taken 7/1/2024 6284)  Outcome Evaluation: tolerated clear liquids, multiple bms during shift  Plan of Care Reviewed With:   patient   spouse  Overall Patient Progress: improving  Goal: Patient-Specific Goal (Individualized)  Description: You can add care plan individualizations to a care plan. Examples of Individualization might be:  \"Parent requests to be called daily at 9am for status\", \"I have a hard time hearing out of my right ear\", or \"Do not touch me to wake me up as it startles  me\".  Outcome: Progressing  Goal: Absence of Hospital-Acquired Illness or Injury  Outcome: Progressing  Intervention: Identify and Manage Fall Risk  Recent Flowsheet Documentation  Taken 7/1/2024 1702 by Hal Gilliland RN  Safety Promotion/Fall Prevention:   activity supervised   increased rounding and observation   nonskid shoes/slippers when out of bed   safety round/check completed   treat reversible contributory factors   patient and family education   assistive device/personal items within reach   room near nurse's station  Intervention: Prevent and Manage VTE (Venous Thromboembolism) Risk  Recent Flowsheet Documentation  Taken 7/1/2024 1702 by Hal Gilliland RN  VTE Prevention/Management: SCDs off (sequential compression " devices)  Intervention: Prevent Infection  Recent Flowsheet Documentation  Taken 7/1/2024 1702 by Hal Gilliland, RN  Infection Prevention: rest/sleep promoted  Goal: Optimal Comfort and Wellbeing  Outcome: Progressing  Goal: Readiness for Transition of Care  Outcome: Progressing     Problem: Bowel Disease, Inflammatory (Ulcerative Colitis or Crohn's Disease)  Goal: Optimal Adaptation to Chronic Illness  Outcome: Progressing  Goal: Absence of Infection Signs and Symptoms  Outcome: Progressing  Goal: Optimal Nutrition Delivery  Outcome: Progressing  Goal: Optimal Pain Control and Function  Outcome: Progressing     Problem: Comorbidity Management  Goal: Blood Glucose Levels Within Targeted Range  Outcome: Progressing  Intervention: Monitor and Manage Glycemia  Recent Flowsheet Documentation  Taken 7/1/2024 1702 by Hal Gilliland, RN  Medication Review/Management: medications reviewed     Goal Outcome Evaluation:      Plan of Care Reviewed With: patient, spouse    Overall Patient Progress: improving    Outcome Evaluation: tolerated clear liquids, multiple bms during shift

## 2024-07-02 NOTE — PLAN OF CARE
"To Do:  End of Shift Summary  For vital signs and complete assessments, please see documentation flowsheets.     Pertinent assessments:  A&o--- up in room and halls-----frequent loose stools Cdiff negative -- tolerating low fiber diet -- denies nausea -- co feeling fluid overload IV SL locked ----  Major Shift Events K and phos replaced   Treatment Plan: discharge soon   Bedside Nurse: Miranda Santiago RN     Problem: Adult Inpatient Plan of Care  Goal: Plan of Care Review  Description: The Plan of Care Review/Shift note should be completed every shift.  The Outcome Evaluation is a brief statement about your assessment that the patient is improving, declining, or no change.  This information will be displayed automatically on your shift  note.  Outcome: Progressing  Flowsheets (Taken 7/2/2024 7528)  Outcome Evaluation: low fiber diet  Plan of Care Reviewed With: patient  Overall Patient Progress: improving  Goal: Patient-Specific Goal (Individualized)  Description: You can add care plan individualizations to a care plan. Examples of Individualization might be:  \"Parent requests to be called daily at 9am for status\", \"I have a hard time hearing out of my right ear\", or \"Do not touch me to wake me up as it startles  me\".  Outcome: Progressing  Goal: Absence of Hospital-Acquired Illness or Injury  Outcome: Progressing  Goal: Optimal Comfort and Wellbeing  Outcome: Progressing  Intervention: Monitor Pain and Promote Comfort  Recent Flowsheet Documentation  Taken 7/2/2024 0953 by Miranda Santiago, RN  Pain Management Interventions: medication (see MAR)  Goal: Readiness for Transition of Care  Outcome: Progressing   Goal Outcome Evaluation:                        "

## 2024-07-02 NOTE — PLAN OF CARE
"Inpatient progress note [id number]  Assessments:   A&O, up to BR w/ x1 assist. Did walk x1 w/ nursing in halls. Incontinent of bowel & bladder at times. Up with Ax1 with walker. Cipro, flagyl, IVF, encourage ambulation, Surgery following.     Plan: pt is on clears, tolerating, pt encouraged to walk in halls today.       Goal Outcome Evaluation:      Plan of Care Reviewed With: patient    Overall Patient Progress: improvingOverall Patient Progress: improving           Problem: Adult Inpatient Plan of Care  Goal: Plan of Care Review  Description: The Plan of Care Review/Shift note should be completed every shift.  The Outcome Evaluation is a brief statement about your assessment that the patient is improving, declining, or no change.  This information will be displayed automatically on your shift  note.  7/2/2024 0808 by Ester Noland, RN  Outcome: Progressing  Flowsheets (Taken 7/2/2024 0808)  Plan of Care Reviewed With: patient  Overall Patient Progress: improving  7/2/2024 0808 by Ester Noland, RN  Outcome: Progressing  Flowsheets (Taken 7/2/2024 0808)  Plan of Care Reviewed With: patient  Overall Patient Progress: improving  Goal: Patient-Specific Goal (Individualized)  Description: You can add care plan individualizations to a care plan. Examples of Individualization might be:  \"Parent requests to be called daily at 9am for status\", \"I have a hard time hearing out of my right ear\", or \"Do not touch me to wake me up as it startles  me\".  7/2/2024 0808 by Ester Noland, RN  Outcome: Progressing  7/2/2024 0808 by Ester Noland, RN  Outcome: Progressing  Goal: Absence of Hospital-Acquired Illness or Injury  7/2/2024 0808 by Ester Noland, RN  Outcome: Progressing  7/2/2024 0808 by Ester Noland, RN  Outcome: Progressing  Intervention: Identify and Manage Fall Risk  Recent Flowsheet Documentation  Taken 7/2/2024 0140 by sEter Noland, RN  Safety Promotion/Fall Prevention:   activity supervised   " increased rounding and observation   nonskid shoes/slippers when out of bed   safety round/check completed   treat reversible contributory factors   patient and family education   assistive device/personal items within reach   room near nurse's station  Intervention: Prevent Skin Injury  Recent Flowsheet Documentation  Taken 7/2/2024 0140 by Ester Noland RN  Body Position: position changed independently  Taken 7/2/2024 0138 by Ester Noland RN  Body Position: position changed independently  Intervention: Prevent and Manage VTE (Venous Thromboembolism) Risk  Recent Flowsheet Documentation  Taken 7/2/2024 0140 by Ester Noland RN  VTE Prevention/Management: SCDs on (sequential compression devices)  Intervention: Prevent Infection  Recent Flowsheet Documentation  Taken 7/2/2024 0140 by Ester Noland RN  Infection Prevention: rest/sleep promoted  Goal: Optimal Comfort and Wellbeing  7/2/2024 0808 by Ester Noland RN  Outcome: Progressing  7/2/2024 0808 by Ester Noland RN  Outcome: Progressing  Intervention: Monitor Pain and Promote Comfort  Recent Flowsheet Documentation  Taken 7/2/2024 0138 by Ester Noland RN  Pain Management Interventions: declines  Goal: Readiness for Transition of Care  7/2/2024 0808 by Ester Noland RN  Outcome: Progressing  7/2/2024 0808 by Ester Noland RN  Outcome: Progressing     Problem: Bowel Disease, Inflammatory (Ulcerative Colitis or Crohn's Disease)  Goal: Optimal Adaptation to Chronic Illness  7/2/2024 0808 by Ester Noland RN  Outcome: Progressing  7/2/2024 0808 by Ester Noland RN  Outcome: Progressing  Goal: Absence of Infection Signs and Symptoms  7/2/2024 0808 by Ester Noland RN  Outcome: Progressing  7/2/2024 0808 by Ester Noland RN  Outcome: Progressing  Goal: Optimal Nutrition Delivery  7/2/2024 0808 by Ester Noland RN  Outcome: Progressing  7/2/2024 0808 by Ester Noland RN  Outcome: Progressing  Goal: Optimal Pain Control and  Function  7/2/2024 0808 by Ester Noland, RN  Outcome: Progressing  7/2/2024 0808 by Ester Noland, RN  Outcome: Progressing  Intervention: Prevent or Manage Pain  Recent Flowsheet Documentation  Taken 7/2/2024 0138 by Ester Noland, RN  Pain Management Interventions: declines     Problem: Comorbidity Management  Goal: Blood Glucose Levels Within Targeted Range  7/2/2024 0808 by Ester Noland, RN  Outcome: Progressing  7/2/2024 0808 by Ester Noland, RN  Outcome: Progressing  Intervention: Monitor and Manage Glycemia  Recent Flowsheet Documentation  Taken 7/2/2024 0140 by Ester Noland, RN  Medication Review/Management: medications reviewed

## 2024-07-02 NOTE — PROGRESS NOTES
[facility]     Hospitalist Progress Note     Assessment & Plan     ASSESSMENT    73F with hx of chronic pain, PHIL, and previous gastric bypass surgery presents with abdominal pain and found to have a small bowel obstruction causing bowel ischemia and perforation and is now s/p ex lap with small bowel resection 06/28. NGT removed 07/01, working on diet advancement.    Having diarrhea but otherwise doing well, advancing diet, NG tube removed, can switch antibiotics to oral.    PLAN    Small Bowel Obstruction Causing Bowel Ischemia  -Presents with abdominal pain and found to have a small bowel obstruction potentially causing bowel ischemia  -During surgery, evidence of adhesion causing obstruction, bowel ischemia, and perforation s/p bowel resection  PLAN  -Now on low fiber diet  -Pain regimen in place  -General surgery following, appreciate recommendations    Diarrhea  -May be due to postoperative state and/or antibiotics although surgery checking for C. difficile    Peritonitis  -Due to bowel perforation  -Cx from surgery polymicrobial, originally on Zosyn  -E. coli resistant to Zosyn, no single agent to cover all of these organisms so started on concurrent cipro  PLAN  -Zosyn --> Augmentin, Ciprofloxacin IV --> Oral, 14d course should be sufficient     Other issues  -Chronic neuropathy: Continue home gabapentin  -Hypokalemia: Replacement protocol  -Hypophosphatemia: Replacement protocol  -Hyponatremia: Resolving  -PHIL and MDD: Home medications  -Pulmonary nodule: Follow-up with PCP as an outpatient  -Nocturnal hypoxia: Will refer for sleep study as an outpatient at discharge    DVT Prophy  -LMWH    Disposition  -Medically ready for discharge: Likely Thursday or Friday home with home health PT      Renan Bradley MD    Subjective     Patient seen at bedside and case discussed with patient's .  Having very frequent bowel movements.  Surgery treatment for C. difficile.  Has been able to  "advance diet.        Objective   Blood pressure (!) 143/59, pulse 79, temperature 99.4  F (37.4  C), temperature source Oral, resp. rate 16, height 1.6 m (5' 3\"), weight 92 kg (202 lb 13.2 oz), SpO2 90%.    PHYSICAL EXAM  General: In no acute distress.  CV: RRR.  Lungs: CTAB. Nl WOB.  Abd: Abdominal binder in place, mild tenderness throughout  Ext: No edema.    LABS AND IMAGING  Reviewed and pertinent results discussed in assessment and plan.   "

## 2024-07-02 NOTE — PROGRESS NOTES
"[facility]   General Surgery Progress Note          Assessment and Plan:   Assessment:   POD#4 s/p diagnostic laparoscopy converted to laparotomy, lysis of adhesions and small bowel resection. Adhesive band in the pelvis causing complete SBO causing perforation.  -Postoperative ileus as expected - resolving (+BMs)  -Intraoperative abdominal fluid cultures with klebsiella and aeromonas, pending sensitivities      Plan:   -Diet: advance diet as tolerated (low res)  -Discontinue IV fluids  -Check for C diff (multiple loose green stools)  -IV ABX: Zosyn, await culture results  -Pain Management: IV dilaudid  -IV fluids  -DVT prophylaxis: lovenox  -Increase activity as tolerated, sit up in chair and walk halls as able today  -Dispo: Could be ready from surgical perspective in the next 1-2 days to discharge.      Seen and agree with above.     Enoch Duarte MD        Interval History:   Sitting up in bed, doing well. Denies pain. Tolerating clears. She has had over 15 loose BMs, she states they have been green and odorous, we discussed testing for C diff. She feels swollen. She is up walking halls when able.          Physical Exam:   Blood pressure (!) 143/59, pulse 79, temperature 99.4  F (37.4  C), temperature source Oral, resp. rate 16, height 1.6 m (5' 3\"), weight 92 kg (202 lb 13.2 oz), SpO2 90%.    I/O last 3 completed shifts:  In: 120 [P.O.:120]  Out: -     Abdomen: soft, mildly distended, +mild diffuse tenderness  Inc(s) - incisions c/d/I, staples in place, no drainage or erythema          Data:     Recent Labs   Lab 07/02/24  0709 07/01/24  0726 06/30/24  0803   WBC 11.2* 12.0* 12.9*   HGB 9.3* 9.3* 9.8*   HCT 27.5* 29.2* 31.0*   MCV 89 93 93    450 434         Recent Labs   Lab 07/02/24  0709 07/01/24  1719 07/01/24  0726 07/01/24  0700 06/30/24  2210 06/30/24  0803   *  --  134*  --   --  131*   POTASSIUM 3.0* 3.5 3.0*  --   --  3.6   CHLORIDE 100  --  99  --   --  96*   CO2 22  --  " 20*  --   --  19*   ANIONGAP 12  --  15  --   --  16*   *  --  96 95   < > 93   BUN 2.3*  --  6.1*  --   --  6.1*   CR 0.58  --  0.60  --   --  0.54   GFRESTIMATED >90  --  >90  --   --  >90   ERICK 7.5*  --  7.7*  --   --  7.8*    < > = values in this interval not displayed.          Nir Woodard PA-C  Text page [phone] 8am-4pm  After 4pm call [phone]    Seen and agree,    Artem Jeffery MD  [facility]

## 2024-07-02 NOTE — CONSULTS
"CLINICAL NUTRITION SERVICES  -  ASSESSMENT NOTE      Recommendations:   - Diet per MD teams.  Briefly discussed availability of smaller/more frequent meals or snacks if needed.  - Offered oral supplements, pt politely declined.      MALNUTRITION:  % Weight Loss:  None noted  % Intake:  </= 50% for >/= 5 days (severe malnutrition)  Subcutaneous Fat Loss:  None observed   Muscle Loss:  None observed  Fluid Retention: None documented    Malnutrition Diagnosis: Patient does not meet two of the above criteria necessary for diagnosing malnutrition          REASON FOR ASSESSMENT  Yoselin Zelaya is a 73 year old female seen by Registered Dietitian for length of stay.    PMH of: Gastric bypass, TIA, anemia, subarachnoid hemorrhage d/t fall.    Admit 2/2: Enterocolitis, concern for ileus but eventually found to have \"adhesive band in the pelvic causing complete SBO and perforation\".    NUTRITION HISTORY  - Information obtained from patient and chart.  - Diet at home: Regular w/ meals BID +/- small snacks.  - Barriers to PO intakes: No decrease in PO intakes PTA.  - Allergies: NKFA.      CURRENT NUTRITION ORDERS  Diet Order:     Fulls    Current Intake/Tolerance:  Patient required NGT placement on 6/26 which patient accidentally pulled or dislodged 6/27.  Trialed clears but had worsening sx, therefore required replacement of NGT and eventually diagnostic lap converted to exploratory laparotomy with lysis of adhesions and small bowel resection 6/28.  NGT removed yesterday and clear liquids initiated.  Patient has been NPO/liquids since admission.  Surgery note from today indicates full liquids for lunch w/ possibility of low fiber later today.      ANTHROPOMETRICS  Height: 5' 3\"  Weight: 202 lbs 13.17 oz  Body mass index is 35.93 kg/m .  Weight Status:  Obesity Grade II BMI 35-39.9  Weight History:  Wt Readings from Last 10 Encounters:   07/01/24 92 kg (202 lb 13.2 oz)     - No current documentation of edema.    - Admit wts " via standing scale have been closer to 187-193# and bed scale wts varying greatly.    - Recent wt trends are limited, care everywhere was reviewed.  Wt of 182# from 6/13/2024.      LABS: Reviewed:  - Na low at 134  - K low at 3 --> set to be replaced  - Phosphorus low at 2 --> set to be replaced    MEDICATIONS: Reviewed:  - NaCl at 75 mL/hr    GI: Stooling patterns noted w/ recorded BMs on 7/01.    SKIN: No current documentation of PI.       ASSESSED NUTRITION NEEDS PER APPROVED PRACTICE GUIDELINES:    Dosing Weight 85 kg - most accurate (standing scale wt)?  Estimated Energy Needs: >/=1589 kcals - [place] Jeor  Justification: maintenance w/ activity factor >/=1.2  Estimated Protein Needs: >/=102 grams protein - >/=1.2 g pro/Kg  Justification: post-op and preservation of lean body mass  Estimated Fluid Needs: per MD      NUTRITION DIAGNOSIS:  Inadequate oral intake related to altered GI function pre- and post-op as evidenced by NPO/clears meeting <50% of nutrition needs x 7 day admit.      NUTRITION INTERVENTIONS  Recommendations / Nutrition Prescription  See above.    Implementation  Nutrition education: Provided education on above.    Collaboration and Referral of Nutrition care: Discussed POC with team during rounds.  RN in room during assessment.    Nutrition goals:  Diet advancement to solids w/in 24-48 hrs.  PO intakes of at least 50-75% of meals or snacks TID.     MONITORING AND EVALUATION:  Progress towards goals will be monitored and evaluated per protocol and Practice Guidelines          Paige Schwab RDN, LD  Clinical Dietitian  3rd floor/ICU: [phone]  All other floors: [phone]  Weekend/holiday: [phone]  Office: [phone]

## 2024-07-03 ENCOUNTER — APPOINTMENT (OUTPATIENT)
Dept: PHYSICAL THERAPY | Facility: CLINIC | Age: 74
DRG: 329 | End: 2024-07-03
Payer: MEDICARE

## 2024-07-03 LAB
ANION GAP SERPL CALCULATED.3IONS-SCNC: 15 MMOL/L (ref 7–15)
BUN SERPL-MCNC: 1.7 MG/DL (ref 8–23)
CALCIUM SERPL-MCNC: 8.2 MG/DL (ref 8.8–10.2)
CHLORIDE SERPL-SCNC: 96 MMOL/L (ref 98–107)
CREAT SERPL-MCNC: 0.56 MG/DL (ref 0.51–0.95)
DEPRECATED HCO3 PLAS-SCNC: 24 MMOL/L (ref 22–29)
EGFRCR SERPLBLD CKD-EPI 2021: >90 ML/MIN/1.73M2
ERYTHROCYTE [DISTWIDTH] IN BLOOD BY AUTOMATED COUNT: 13.5 % (ref 10–15)
GLUCOSE SERPL-MCNC: 99 MG/DL (ref 70–99)
HCT VFR BLD AUTO: 28.6 % (ref 35–47)
HGB BLD-MCNC: 9.2 G/DL (ref 11.7–15.7)
MAGNESIUM SERPL-MCNC: 1.7 MG/DL (ref 1.7–2.3)
MCH RBC QN AUTO: 28.9 PG (ref 26.5–33)
MCHC RBC AUTO-ENTMCNC: 32.2 G/DL (ref 31.5–36.5)
MCV RBC AUTO: 90 FL (ref 78–100)
PHOSPHATE SERPL-MCNC: 3.2 MG/DL (ref 2.5–4.5)
PLATELET # BLD AUTO: 496 10E3/UL (ref 150–450)
POTASSIUM SERPL-SCNC: 3.2 MMOL/L (ref 3.4–5.3)
POTASSIUM SERPL-SCNC: 3.5 MMOL/L (ref 3.4–5.3)
RBC # BLD AUTO: 3.18 10E6/UL (ref 3.8–5.2)
SODIUM SERPL-SCNC: 135 MMOL/L (ref 135–145)
WBC # BLD AUTO: 9.6 10E3/UL (ref 4–11)

## 2024-07-03 PROCEDURE — 250N000013 HC RX MED GY IP 250 OP 250 PS 637: Performed by: STUDENT IN AN ORGANIZED HEALTH CARE EDUCATION/TRAINING PROGRAM

## 2024-07-03 PROCEDURE — 250N000011 HC RX IP 250 OP 636: Performed by: STUDENT IN AN ORGANIZED HEALTH CARE EDUCATION/TRAINING PROGRAM

## 2024-07-03 PROCEDURE — 250N000013 HC RX MED GY IP 250 OP 250 PS 637: Performed by: INTERNAL MEDICINE

## 2024-07-03 PROCEDURE — 120N000001 HC R&B MED SURG/OB

## 2024-07-03 PROCEDURE — 36415 COLL VENOUS BLD VENIPUNCTURE: CPT | Performed by: INTERNAL MEDICINE

## 2024-07-03 PROCEDURE — 85027 COMPLETE CBC AUTOMATED: CPT | Performed by: INTERNAL MEDICINE

## 2024-07-03 PROCEDURE — 80048 BASIC METABOLIC PNL TOTAL CA: CPT | Performed by: INTERNAL MEDICINE

## 2024-07-03 PROCEDURE — 84132 ASSAY OF SERUM POTASSIUM: CPT | Performed by: INTERNAL MEDICINE

## 2024-07-03 PROCEDURE — 84100 ASSAY OF PHOSPHORUS: CPT | Performed by: INTERNAL MEDICINE

## 2024-07-03 PROCEDURE — 99232 SBSQ HOSP IP/OBS MODERATE 35: CPT | Performed by: INTERNAL MEDICINE

## 2024-07-03 PROCEDURE — 83735 ASSAY OF MAGNESIUM: CPT | Performed by: INTERNAL MEDICINE

## 2024-07-03 RX ORDER — POTASSIUM CHLORIDE 1500 MG/1
40 TABLET, EXTENDED RELEASE ORAL ONCE
Status: COMPLETED | OUTPATIENT
Start: 2024-07-03 | End: 2024-07-03

## 2024-07-03 RX ADMIN — SERTRALINE 100 MG: 100 TABLET, FILM COATED ORAL at 08:42

## 2024-07-03 RX ADMIN — Medication 1 CAPSULE: at 08:42

## 2024-07-03 RX ADMIN — CIPROFLOXACIN HYDROCHLORIDE 500 MG: 500 TABLET, FILM COATED ORAL at 20:32

## 2024-07-03 RX ADMIN — ACETAMINOPHEN 975 MG: 160 SOLUTION ORAL at 13:48

## 2024-07-03 RX ADMIN — CIPROFLOXACIN HYDROCHLORIDE 500 MG: 500 TABLET, FILM COATED ORAL at 08:43

## 2024-07-03 RX ADMIN — ACETAMINOPHEN 975 MG: 160 SOLUTION ORAL at 06:08

## 2024-07-03 RX ADMIN — ENOXAPARIN SODIUM 40 MG: 40 INJECTION SUBCUTANEOUS at 16:24

## 2024-07-03 RX ADMIN — GABAPENTIN 900 MG: 300 CAPSULE ORAL at 16:22

## 2024-07-03 RX ADMIN — GABAPENTIN 900 MG: 300 CAPSULE ORAL at 08:43

## 2024-07-03 RX ADMIN — DICYCLOMINE HYDROCHLORIDE 10 MG: 10 CAPSULE ORAL at 13:48

## 2024-07-03 RX ADMIN — AMOXICILLIN AND CLAVULANATE POTASSIUM 1 TABLET: 875; 125 TABLET, FILM COATED ORAL at 20:40

## 2024-07-03 RX ADMIN — GABAPENTIN 900 MG: 300 CAPSULE ORAL at 20:32

## 2024-07-03 RX ADMIN — POTASSIUM CHLORIDE 40 MEQ: 1500 TABLET, EXTENDED RELEASE ORAL at 08:40

## 2024-07-03 RX ADMIN — DICYCLOMINE HYDROCHLORIDE 10 MG: 10 CAPSULE ORAL at 16:22

## 2024-07-03 RX ADMIN — DICYCLOMINE HYDROCHLORIDE 10 MG: 10 CAPSULE ORAL at 20:32

## 2024-07-03 RX ADMIN — AMOXICILLIN AND CLAVULANATE POTASSIUM 1 TABLET: 875; 125 TABLET, FILM COATED ORAL at 08:41

## 2024-07-03 RX ADMIN — ACETAMINOPHEN 975 MG: 160 SOLUTION ORAL at 18:21

## 2024-07-03 RX ADMIN — DICYCLOMINE HYDROCHLORIDE 10 MG: 10 CAPSULE ORAL at 08:44

## 2024-07-03 RX ADMIN — Medication 1 CAPSULE: at 20:32

## 2024-07-03 ASSESSMENT — ACTIVITIES OF DAILY LIVING (ADL)
ADLS_ACUITY_SCORE: 25

## 2024-07-03 NOTE — PROGRESS NOTES
"[facility]   General Surgery Progress Note          Assessment and Plan:   Assessment:   POD#5 s/p diagnostic laparoscopy converted to laparotomy, lysis of adhesions and small bowel resection. Adhesive band in the pelvis causing complete SBO causing perforation.  -Postoperative ileus as expected - resolving (+BMs)  -C diff negative      Plan:   -Diet: low res diet, 6 small meals  -Discontinue IV fluids  -IV ABX: Zosyn, await culture results  -Pain Management: IV dilaudid  -IV fluids  -DVT prophylaxis: lovenox  -Increase activity as tolerated, sit up in chair and walk halls as able today  -Dispo: Meeting surgical criteria for discharge, await clearance from medical perspective     Seen and agree.     Enoch Duarte MD        Interval History:   Sitting up in bed, doing well. Denies pain. Discomfort controlled with tylenol. Tolerating low-res diet. She continues to have loose stool. Up walking the halls. Voiding independently.          Physical Exam:   Blood pressure (!) 156/66, pulse 89, temperature 99  F (37.2  C), temperature source Oral, resp. rate 18, height 1.6 m (5' 3\"), weight 84.4 kg (186 lb 1.6 oz), SpO2 95%.    I/O last 3 completed shifts:  In: 240 [P.O.:240]  Out: -     Abdomen: soft, mildly distended, non-tender  Inc(s) - incisions c/d/I, staples in place, no drainage or erythema          Data:     Recent Labs   Lab 07/03/24  0610 07/02/24  0709 07/01/24  0726   WBC 9.6 11.2* 12.0*   HGB 9.2* 9.3* 9.3*   HCT 28.6* 27.5* 29.2*   MCV 90 89 93   * 450 450         Recent Labs   Lab 07/03/24  0610 07/02/24  1617 07/02/24  1459 07/02/24  0709 07/01/24  1719 07/01/24  0726     --   --  134*  --  134*   POTASSIUM 3.2* 3.8 4.2 3.0*   < > 3.0*   CHLORIDE 96*  --   --  100  --  99   CO2 24  --   --  22  --  20*   ANIONGAP 15  --   --  12  --  15   GLC 99  --   --  104*  --  96   BUN 1.7*  --   --  2.3*  --  6.1*   CR 0.56  --   --  0.58  --  0.60   GFRESTIMATED >90  --   --  >90  --  >90 "   ERICK 8.2*  --   --  7.5*  --  7.7*    < > = values in this interval not displayed.          Nir Woodard PA-C  Text page [phone] 8am-4pm  After 4pm call [phone]

## 2024-07-03 NOTE — PROGRESS NOTES
[facility]    Hospitalist Progress Note  Name: Yoselin Zelaya    MRN: [id number]  Provider: Ade Lester MD  Date of Service: 07/03/2024    Assessment & Plan   Summary of Stay: Yoselin Zelaya is a 73 year old female who was admitted on 6/23/2024. Patient with a history of chronic pain, PHIL, and previous gastric bypass surgery presented with abdominal pain and was found to have a small bowel obstruction causing bowel ischemia and perforation, requiring an ex lap with small bowel resection on 06/28. Postoperatively, she is advancing her diet, with the NG tube removed on 07/01, and can switch from IV to oral antibiotics. She is experiencing diarrhea, likely due to the postoperative state or antibiotics, C. difficile negative. Her peritonitis due to bowel perforation is being managed with a switch from Zosyn to Augmentin and ciprofloxacin, with a planned 14-day course. Other managed issues include chronic neuropathy, hypokalemia, hypophosphatemia, resolving hyponatremia, and PHIL/MDD, with follow-ups planned for a pulmonary nodule and nocturnal hypoxia.     7/3/2024 general surgery is following and patient surgically feels ready for discharge however continues to have 20+ loose stools , patient is weak and dizzy and has hypokalemia.    Small Bowel Obstruction Causing Bowel Ischemia  -Presents with abdominal pain and found to have a small bowel obstruction potentially causing bowel ischemia  -During surgery, evidence of adhesion causing obstruction, bowel ischemia, and perforation s/p bowel resection  -Now on low fiber diet  -Pain regimen in place  -General surgery following, appreciate recommendations     Diarrhea  -May be due to postoperative state and/or antibiotics   -Negative for C. Difficile  -Supportive care for now, would avoid antidiarrheal meds if we can       Peritonitis  -Due to bowel perforation  -Cx from surgery polymicrobial, originally on Zosyn  -E. coli resistant to Zosyn, no  single agent to cover all of these organisms so started on concurrent cipro  PLAN  -Zosyn --> Augmentin, Ciprofloxacin IV --> Oral, 14d course should be sufficient      Other issues  -Chronic neuropathy: Continue home gabapentin  -Hypokalemia: Replacement protocol  -Hypophosphatemia: Replacement protocol  -Hyponatremia: Resolving  -PHIL and MDD: Home medications  -Pulmonary nodule: Follow-up with PCP as an outpatient  -Nocturnal hypoxia: Will refer for sleep study as an outpatient at discharge         DVT Prophylaxis: Enoxaparin (Lovenox) SQ  Code Status: Full Code    Disposition: Expected discharge in 1 to 2 days when diarrhea improves and electrolytes are stable      Interval History   Assumed care reviewed chart.  Patient complains of about 20+ loose stools complains of generalized malaise weakness lightheadedness dizziness.  No chest pain pressure heaviness or tightness.  More than 10 point review of system was carried out was otherwise negative.  Total time spent direct patient care coordination of care is more than 35 minutes    -Data reviewed today: I reviewed all new labs and imaging reports over the last 24 hours. I personally reviewed no images or EKG's today.    Physical Exam   Temp: 99  F (37.2  C) Temp src: Oral BP: (!) 156/66 Pulse: 89   Resp: 18 SpO2: 95 % O2 Device: None (Room air)    Vitals:    06/29/24 0553 07/01/24 0549 07/03/24 0515   Weight: 89.1 kg (196 lb 6.9 oz) 92 kg (202 lb 13.2 oz) 84.4 kg (186 lb 1.6 oz)     Vital Signs with Ranges  Temp:  [98.2  F (36.8  C)-99.1  F (37.3  C)] 99  F (37.2  C)  Pulse:  [75-89] 89  Resp:  [16-20] 18  BP: (150-161)/(53-68) 156/66  SpO2:  [94 %-96 %] 95 %  I/O last 3 completed shifts:  In: 240 [P.O.:240]  Out: -       GEN:  Alert, oriented x 3, appears comfortable, NAD.  HEENT:  Normocephalic/atraumatic, no scleral icterus, no nasal discharge, mouth moist.  CV:  Regular rate and rhythm, no murmur or JVD.  S1 + S2 noted, no S3 or S4.  LUNGS:  Clear to  auscultation bilaterally without rales/rhonchi/wheezing/retractions.  Symmetric chest rise on inhalation noted.  ABD:  Active bowel sounds, soft, minimal abdominal tenderness.  No rebound/guarding/rigidity.  EXT:  No edema.  No cyanosis.  No joint synovitis noted.  SKIN:  Dry to touch, no exanthems noted in the visualized areas.    Medications   Current Facility-Administered Medications   Medication Dose Route Frequency Provider Last Rate Last Admin     Current Facility-Administered Medications   Medication Dose Route Frequency Provider Last Rate Last Admin    acetaminophen (TYLENOL) oral liquid 975 mg  975 mg Oral Q6H Miah Self MD   975 mg at 07/03/24 0608    amoxicillin-clavulanate (AUGMENTIN) 875-125 MG per tablet 1 tablet  1 tablet Oral Q12H [facility] (08/20) Miah Self MD   1 tablet at 07/02/24 2006    ciprofloxacin (CIPRO) tablet 500 mg  500 mg Oral Q12H [facility] (08/20) Miah Self MD   500 mg at 07/02/24 2006    dicyclomine (BENTYL) capsule 10 mg  10 mg Oral 4x Daily Enoch Duarte MD   10 mg at 07/02/24 2006    enoxaparin ANTICOAGULANT (LOVENOX) injection 40 mg  40 mg Subcutaneous Q24H Enoch Duarte MD   40 mg at 07/02/24 1512    gabapentin (NEURONTIN) capsule 900 mg  900 mg Oral TID Enoch Duarte MD   900 mg at 07/02/24 2006    lactobacillus rhamnosus (GG) (CULTURELL) capsule 1 capsule  1 capsule Oral BID Enoch Duarte MD   1 capsule at 07/02/24 2006    potassium chloride jasper ER (KLOR-CON M20) CR tablet 40 mEq  40 mEq Oral Once Miah Self MD        sertraline (ZOLOFT) tablet 100 mg  100 mg Oral QAM Enoch Duarte MD   100 mg at 07/02/24 0938    sodium chloride (PF) 0.9% PF flush 3 mL  3 mL Intracatheter Q8H Enoch Duarte MD   3 mL at 07/03/24 0215     Data     Recent Labs   Lab 07/03/24  0610 07/02/24  0709 07/01/24  0726   WBC 9.6 11.2* 12.0*   HGB 9.2* 9.3* 9.3*   HCT 28.6* 27.5* 29.2*   MCV 90 89 93   * 450 450      Recent Labs   Lab 07/03/24  0610 07/02/24  1617 07/02/24  1459 07/02/24  0709 07/01/24  1719 07/01/24  0726     --   --  134*  --  134*   POTASSIUM 3.2* 3.8 4.2 3.0*   < > 3.0*   CHLORIDE 96*  --   --  100  --  99   CO2 24  --   --  22  --  20*   ANIONGAP 15  --   --  12  --  15   GLC 99  --   --  104*  --  96   BUN 1.7*  --   --  2.3*  --  6.1*   CR 0.56  --   --  0.58  --  0.60   GFRESTIMATED >90  --   --  >90  --  >90   ERICK 8.2*  --   --  7.5*  --  7.7*    < > = values in this interval not displayed.     7-Day Micro Results       Collected Updated Procedure Result Status      07/02/2024 1046 07/02/2024 1514 C. difficile Toxin B PCR with reflex to C. difficile Antigen and Toxins A/B EIA [[id number]]    Stool from Per Rectum    Final result Component Value   C Difficile Toxin B by PCR Negative   A negative result does not exclude actual disease due to C. difficile and may be due to improper collection, handling and storage of the specimen or the number of organisms in the specimen is below the detection limit of the assay.            06/28/2024 1413 07/02/2024 0836 Anaerobic Bacterial Culture Routine [[id number]]   (Abnormal)   Peritoneal Fluid from Peritoneum    Final result Component Value   Culture 4+ Prevotella buccae    Susceptibilities not routinely done, refer to antibiogram to view typical susceptibility profiles    4+ Prevotella species    Identification obtained by MALDI-TOF mass spectrometry research use only database. Test characteristics determined and verified by the Infect[facility].  Susceptibilities not routinely done, refer to antibiogram to view typical susceptibility profiles    4+ Mixed Aerobic and Anaerobic josue               06/28/2024 1413 07/02/2024 1312 Peritoneal Fluid Aerobic Bacterial Culture Routine [[id number]]    (Abnormal)   Peritoneal Fluid from Peritoneum    Final result Component Value   Culture 4+ Klebsiella oxytoca    4+ Aeromonas  veronii biovar sobria    3+ Enterococcus faecalis    2+ Escherichia coli    4+ Normal josue        Susceptibility        Klebsiella oxytoca      BELLA      Ampicillin  Resistant  [1]       Ampicillin/ Sulbactam 8 ug/mL Susceptible      Cefepime <=1 ug/mL Susceptible      Ceftazidime <=1 ug/mL Susceptible      Ceftriaxone <=1 ug/mL Susceptible      Ciprofloxacin <=0.25 ug/mL Susceptible      Gentamicin <=1 ug/mL Susceptible      Levofloxacin <=0.12 ug/mL Susceptible      Meropenem <=0.25 ug/mL Susceptible      Piperacillin/Tazobactam <=4 ug/mL Susceptible      Tobramycin <=1 ug/mL Susceptible      Trimethoprim/Sulfamethoxazole <=1/19 ug/mL Susceptible                   [1]  Intrinsically Resistant               Susceptibility        Aeromonas veronii biovar sobria      BELLA      Amikacin <=8  Susceptible      Ampicillin >16  No interpretation available      Ampicillin/ Sulbactam >16  No interpretation available      Cefazolin 8  No interpretation available      Cefepime <=2  Susceptible      Ceftazidime <=1  Susceptible      Ceftazidime Avibactam <=2  No interpretation available      Ceftolozane/Tazobactam <=2  No interpretation available      Ceftriaxone <=0.5  Susceptible      Ciprofloxacin <=0.25  Susceptible      Gentamicin <=2  Susceptible      Levofloxacin <=0.5  Susceptible      Meropenem <=0.5  Susceptible      Minocycline <=1        Nitrofurantoin <=32  No interpretation available      Piperacillin/Tazobactam <=8  Susceptible      Tetracycline <=4        Tigecycline <=1  No interpretation available      Trimethoprim/Sulfamethoxazole <=2/38  Susceptible                      Susceptibility        Enterococcus faecalis      BELLA      Ampicillin <=2 ug/mL Susceptible      Gentamicin Synergy Susceptible ug/mL Susceptible  [1]       Vancomycin 1 ug/mL Susceptible                   [1]  No high level gentamicin resistance found - therefore combination therapy with an aminoglycoside may be indicated for serious  "enterococcal infections such as bacteremia and endocarditis.               Susceptibility        Escherichia coli      BELLA      Ampicillin 8 ug/mL Susceptible      Ampicillin/ Sulbactam 4 ug/mL Susceptible      Cefepime <=1 ug/mL Susceptible      Ceftazidime <=1 ug/mL Susceptible      Ceftriaxone <=1 ug/mL Susceptible      Ciprofloxacin <=0.25 ug/mL Susceptible      Gentamicin <=1 ug/mL Susceptible      Levofloxacin <=0.12 ug/mL Susceptible      Meropenem <=0.25 ug/mL Susceptible      Piperacillin/Tazobactam 32 ug/mL Resistant      Tobramycin <=1 ug/mL Susceptible      Trimethoprim/Sulfamethoxazole <=1/19 ug/mL Susceptible                      Susceptibility Comments       Aeromonas veronii biovar sobria    Antibiotics listed as \"No Interpretation\" have no regulatory guidelines for susceptibility/resistance available.  Previously reported component [ Tobramycin ] is no longer reported.\"      Escherichia coli    Additional susceptibilities in progress.                       No results for input(s): \"SED\", \"CRP\" in the last 168 hours.  GFR Estimate   Date Value Ref Range Status   07/03/2024 >90 >60 mL/min/1.73m2 Final     Comment:     eGFR calculated using 2021 CKD-EPI equation.   07/02/2024 >90 >60 mL/min/1.73m2 Final     Comment:     eGFR calculated using 2021 CKD-EPI equation.   07/01/2024 >90 >60 mL/min/1.73m2 Final     Comment:     eGFR calculated using 2021 CKD-EPI equation.     No results found for: \"GFRESTBLACK\"  Recent Labs   Lab 07/03/24  0610 07/02/24  0709 07/01/24  0726 07/01/24  0700 06/30/24  2210   GLC 99 104* 96 95 99     Recent Labs   Lab 07/03/24  0610 07/02/24  0709 07/01/24  0726   HGB 9.2* 9.3* 9.3*     No results for input(s): \"AST\", \"ALT\", \"GGT\", \"ALKPHOS\", \"BILITOTAL\", \"BILICONJ\", \"BILIDIRECT\", \"KAYLIN\" in the last 168 hours.    Invalid input(s): \"BILIRUBININDIRECT\"  No results for input(s): \"INR\" in the last 168 hours.  Recent Labs   Lab 06/26/24  1439   LACT 1.0     No results for input(s): " "\"LIPASE\" in the last 168 hours.  Recent Labs   Lab 07/03/24  0610 07/02/24  0709 07/01/24  0726   BUN 1.7* 2.3* 6.1*   CR 0.56 0.58 0.60     No results for input(s): \"TSH\" in the last 168 hours.  No results for input(s): \"TROPONIN\", \"TROPI\", \"TROPR\", \"TROPONINIS\" in the last 168 hours.    Invalid input(s): \"TROPT\", \"TROP\", \"TROPONINIES\", \"TNIH\"  No results for input(s): \"COLOR\", \"APPEARANCE\", \"URINEGLC\", \"URINEBILI\", \"URINEKETONE\", \"SG\", \"UBLD\", \"URINEPH\", \"PROTEIN\", \"UROBILINOGEN\", \"NITRITE\", \"LEUKEST\", \"RBCU\", \"WBCU\" in the last 168 hours.    No results found for this or any previous visit (from the past 24 hour(s)).       "

## 2024-07-03 NOTE — CONSULTS
"SPIRITUAL HEALTH SERVICES - Consult Note  RH MS 5  Referral Source/Reason for Visit: [facility] consult per length of stay.    Summary and Recommendations -  Pt Yoselin reported having recently had surgery to remove a portion of her intestines and is thankful for her continued recovery.  She mentioned being a member of [employer] in [place] and requested that I contact them on her behalf.    Plan: I and other chaplains remain available on request.    Keven Reyez    Intern     [facility] routine referrals?*[phone]  [facility] available 24/7 for emergent requests/referrals, either by paging the on-call  or by entering an ASAP/STAT consult in [facility], which will also page the on-call .      Assessment    Saw pt Yoselin K Garcia per [facility] consult per length of stay.  Her  was present during visit.    Patient/Family Understanding of Illness and Goals of Care -   Pt Swetha reported coming to the hospital for bowel pain and recently had surgery to remove part of it.    She reports she is pleased with her recovery and is thankful for the care she has received.    Distress and Loss -   Yoselin mentioned previous health struggles related to a fall, for which she had a long hospitalization.  She also mentioned some struggles with the health of her children, but framed that and her health issues as her family being \"God's favorite sitcom\"    Strengths, Coping, and Resources -   Yoselin reported leaning on her  for support, as well as two children who are nurses.  She takes comfort in her teresa as well, she showed me a well-used devotional that she uses daily.    Meaning, Beliefs, and Spirituality -   Yoselin first named being thankful for God for her recovery, relating that she doesn't know how people who don't have teresa get through tough times.  She is a member at [facility] and participates in [employer] life there; she requested that I contact them.  She was thankful for communion and prayer " from our volunteer [facility] [place] ministers.

## 2024-07-03 NOTE — PLAN OF CARE
"Aox4. VSS on RA. LS clear. Abdominal staples open to air. Ice pack applied on right arm. 3 PIV SL. Voiding good. BS active. Ax1 walker and gaitbelt. PCD on.     Goal Outcome Evaluation:      Overall Patient Progress: improvingOverall Patient Progress: improving       Problem: Adult Inpatient Plan of Care  Goal: Plan of Care Review  Description: The Plan of Care Review/Shift note should be completed every shift.  The Outcome Evaluation is a brief statement about your assessment that the patient is improving, declining, or no change.  This information will be displayed automatically on your shift  note.  Outcome: Progressing  Flowsheets (Taken 7/2/2024 2248)  Overall Patient Progress: improving  Goal: Patient-Specific Goal (Individualized)  Description: You can add care plan individualizations to a care plan. Examples of Individualization might be:  \"Parent requests to be called daily at 9am for status\", \"I have a hard time hearing out of my right ear\", or \"Do not touch me to wake me up as it startles  me\".  Outcome: Progressing  Goal: Absence of Hospital-Acquired Illness or Injury  Outcome: Progressing  Intervention: Identify and Manage Fall Risk  Recent Flowsheet Documentation  Taken 7/2/2024 2006 by Felicia Kimball, RN  Safety Promotion/Fall Prevention:   safety round/check completed   nonskid shoes/slippers when out of bed   mobility aid in reach   lighting adjusted   increase visualization of patient   increased rounding and observation  Intervention: Prevent Skin Injury  Recent Flowsheet Documentation  Taken 7/2/2024 2006 by Felicia Kimball, RN  Body Position:   position changed independently   supine, head elevated   weight shifting  Intervention: Prevent and Manage VTE (Venous Thromboembolism) Risk  Recent Flowsheet Documentation  Taken 7/2/2024 2006 by Felicia Kimball, RN  VTE Prevention/Management: SCDs on (sequential compression devices)  Intervention: Prevent Infection  Recent Flowsheet Documentation  Taken 7/2/2024 " 2006 by Felicia Kimball, RN  Infection Prevention: rest/sleep promoted  Goal: Optimal Comfort and Wellbeing  Outcome: Progressing  Goal: Readiness for Transition of Care  Outcome: Progressing     Problem: Bowel Disease, Inflammatory (Ulcerative Colitis or Crohn's Disease)  Goal: Optimal Adaptation to Chronic Illness  Outcome: Progressing  Goal: Diarrhea Symptom Relief  Intervention: Manage Diarrhea  Recent Flowsheet Documentation  Taken 7/2/2024 2006 by Felicia Kimball, RN  Perineal Care:   perineal hygiene encouraged   perineum cleansed  Goal: Absence of Infection Signs and Symptoms  Outcome: Progressing  Goal: Optimal Nutrition Delivery  Outcome: Progressing  Goal: Optimal Pain Control and Function  Outcome: Progressing     Problem: Comorbidity Management  Goal: Blood Glucose Levels Within Targeted Range  Outcome: Progressing  Intervention: Monitor and Manage Glycemia  Recent Flowsheet Documentation  Taken 7/2/2024 2006 by Felicia Kimball, RN  Medication Review/Management: medications reviewed

## 2024-07-03 NOTE — PLAN OF CARE
"Inpatient progress note [id number]  Pertinent assessments: VSS, on RA, ML inc w/ staples intact, SUZETTE. Gen edema , pt states her body feels \"tight\". Needs encouragement to do things for herself (eating, drinking, opening BR door).     Major Shift Events: received tylenol for pain, denies n/v.     Treatment Plan: discharge 1-2 days per CRS note, low fiber diet, encourage po intake.     Goal Outcome Evaluation:      Plan of Care Reviewed With: patient    Overall Patient Progress: improvingOverall Patient Progress: improving    Outcome Evaluation: no pain, low fiber diet, tolerating it well.      Problem: Adult Inpatient Plan of Care  Goal: Plan of Care Review  Description: The Plan of Care Review/Shift note should be completed every shift.  The Outcome Evaluation is a brief statement about your assessment that the patient is improving, declining, or no change.  This information will be displayed automatically on your shift  note.  Outcome: Progressing  Flowsheets (Taken 7/3/2024 0500)  Outcome Evaluation: no pain, low fiber diet, tolerating it well.  Plan of Care Reviewed With: patient  Overall Patient Progress: improving  Goal: Patient-Specific Goal (Individualized)  Description: You can add care plan individualizations to a care plan. Examples of Individualization might be:  \"Parent requests to be called daily at 9am for status\", \"I have a hard time hearing out of my right ear\", or \"Do not touch me to wake me up as it startles  me\".  Outcome: Progressing  Goal: Absence of Hospital-Acquired Illness or Injury  Outcome: Progressing  Intervention: Identify and Manage Fall Risk  Recent Flowsheet Documentation  Taken 7/3/2024 0215 by Ester Noland RN  Safety Promotion/Fall Prevention: safety round/check completed  Intervention: Prevent Skin Injury  Recent Flowsheet Documentation  Taken 7/3/2024 0215 by Ester Noland, RN  Body Position: position changed independently  Intervention: Prevent and Manage VTE (Venous " Thromboembolism) Risk  Recent Flowsheet Documentation  Taken 7/3/2024 0215 by Ester Noland RN  VTE Prevention/Management: SCDs on (sequential compression devices)  Goal: Optimal Comfort and Wellbeing  Outcome: Progressing  Intervention: Monitor Pain and Promote Comfort  Recent Flowsheet Documentation  Taken 7/3/2024 0215 by Ester Noland RN  Pain Management Interventions: declines  Goal: Readiness for Transition of Care  Outcome: Progressing     Problem: Bowel Disease, Inflammatory (Ulcerative Colitis or Crohn's Disease)  Goal: Optimal Adaptation to Chronic Illness  Outcome: Progressing  Goal: Absence of Infection Signs and Symptoms  Outcome: Progressing  Goal: Optimal Nutrition Delivery  Outcome: Progressing  Goal: Optimal Pain Control and Function  Outcome: Progressing  Intervention: Prevent or Manage Pain  Recent Flowsheet Documentation  Taken 7/3/2024 0215 by Ester Noland RN  Pain Management Interventions: declines     Problem: Comorbidity Management  Goal: Blood Glucose Levels Within Targeted Range  Outcome: Progressing  Intervention: Monitor and Manage Glycemia  Recent Flowsheet Documentation  Taken 7/3/2024 0215 by Ester Noland, RN  Medication Review/Management: medications reviewed

## 2024-07-03 NOTE — PLAN OF CARE
"  Problem: Adult Inpatient Plan of Care  Goal: Plan of Care Review  Description: The Plan of Care Review/Shift note should be completed every shift.  The Outcome Evaluation is a brief statement about your assessment that the patient is improving, declining, or no change.  This information will be displayed automatically on your shift  note.  Outcome: Progressing  Flowsheets (Taken 7/3/2024 1827)  Outcome Evaluation: tolerating diet well  Plan of Care Reviewed With: patient  Overall Patient Progress: improving  Goal: Patient-Specific Goal (Individualized)  Description: You can add care plan individualizations to a care plan. Examples of Individualization might be:  \"Parent requests to be called daily at 9am for status\", \"I have a hard time hearing out of my right ear\", or \"Do not touch me to wake me up as it startles  me\".  Outcome: Progressing  Goal: Absence of Hospital-Acquired Illness or Injury  Outcome: Progressing  Intervention: Identify and Manage Fall Risk  Recent Flowsheet Documentation  Taken 7/3/2024 1100 by Miranda Santiago, RN  Safety Promotion/Fall Prevention: activity supervised  Intervention: Prevent Infection  Recent Flowsheet Documentation  Taken 7/3/2024 1100 by Miranda Santiago, RN  Infection Prevention: hand hygiene promoted  Goal: Optimal Comfort and Wellbeing  Outcome: Progressing  Intervention: Monitor Pain and Promote Comfort  Recent Flowsheet Documentation  Taken 7/3/2024 0843 by Miranda Santiago, RN  Pain Management Interventions: medication (see MAR)  Goal: Readiness for Transition of Care  Outcome: Progressing   Goal Outcome Evaluation:      Plan of Care Reviewed With: patient    Overall Patient Progress: improvingOverall Patient Progress: improving    Outcome Evaluation: tolerating diet well      "

## 2024-07-04 LAB
ANION GAP SERPL CALCULATED.3IONS-SCNC: 15 MMOL/L (ref 7–15)
BUN SERPL-MCNC: 2.2 MG/DL (ref 8–23)
CALCIUM SERPL-MCNC: 8.5 MG/DL (ref 8.8–10.2)
CHLORIDE SERPL-SCNC: 97 MMOL/L (ref 98–107)
CREAT SERPL-MCNC: 0.57 MG/DL (ref 0.51–0.95)
DEPRECATED HCO3 PLAS-SCNC: 23 MMOL/L (ref 22–29)
EGFRCR SERPLBLD CKD-EPI 2021: >90 ML/MIN/1.73M2
ERYTHROCYTE [DISTWIDTH] IN BLOOD BY AUTOMATED COUNT: 14.1 % (ref 10–15)
GLUCOSE SERPL-MCNC: 103 MG/DL (ref 70–99)
HCT VFR BLD AUTO: 31.6 % (ref 35–47)
HGB BLD-MCNC: 10.3 G/DL (ref 11.7–15.7)
MAGNESIUM SERPL-MCNC: 1.9 MG/DL (ref 1.7–2.3)
MCH RBC QN AUTO: 30 PG (ref 26.5–33)
MCHC RBC AUTO-ENTMCNC: 32.6 G/DL (ref 31.5–36.5)
MCV RBC AUTO: 92 FL (ref 78–100)
PHOSPHATE SERPL-MCNC: 4.4 MG/DL (ref 2.5–4.5)
PLATELET # BLD AUTO: 579 10E3/UL (ref 150–450)
POTASSIUM SERPL-SCNC: 3.6 MMOL/L (ref 3.4–5.3)
RBC # BLD AUTO: 3.43 10E6/UL (ref 3.8–5.2)
SODIUM SERPL-SCNC: 135 MMOL/L (ref 135–145)
WBC # BLD AUTO: 10.7 10E3/UL (ref 4–11)

## 2024-07-04 PROCEDURE — 250N000013 HC RX MED GY IP 250 OP 250 PS 637: Performed by: STUDENT IN AN ORGANIZED HEALTH CARE EDUCATION/TRAINING PROGRAM

## 2024-07-04 PROCEDURE — 84100 ASSAY OF PHOSPHORUS: CPT | Performed by: INTERNAL MEDICINE

## 2024-07-04 PROCEDURE — 99232 SBSQ HOSP IP/OBS MODERATE 35: CPT | Performed by: INTERNAL MEDICINE

## 2024-07-04 PROCEDURE — 36415 COLL VENOUS BLD VENIPUNCTURE: CPT | Performed by: INTERNAL MEDICINE

## 2024-07-04 PROCEDURE — 83735 ASSAY OF MAGNESIUM: CPT | Performed by: INTERNAL MEDICINE

## 2024-07-04 PROCEDURE — 85027 COMPLETE CBC AUTOMATED: CPT | Performed by: INTERNAL MEDICINE

## 2024-07-04 PROCEDURE — 250N000011 HC RX IP 250 OP 636: Performed by: STUDENT IN AN ORGANIZED HEALTH CARE EDUCATION/TRAINING PROGRAM

## 2024-07-04 PROCEDURE — 80048 BASIC METABOLIC PNL TOTAL CA: CPT | Performed by: INTERNAL MEDICINE

## 2024-07-04 PROCEDURE — 250N000013 HC RX MED GY IP 250 OP 250 PS 637: Performed by: INTERNAL MEDICINE

## 2024-07-04 PROCEDURE — 120N000001 HC R&B MED SURG/OB

## 2024-07-04 RX ADMIN — CIPROFLOXACIN HYDROCHLORIDE 500 MG: 500 TABLET, FILM COATED ORAL at 20:05

## 2024-07-04 RX ADMIN — ACETAMINOPHEN 975 MG: 160 SOLUTION ORAL at 12:32

## 2024-07-04 RX ADMIN — DICYCLOMINE HYDROCHLORIDE 10 MG: 10 CAPSULE ORAL at 12:33

## 2024-07-04 RX ADMIN — Medication 1 CAPSULE: at 20:05

## 2024-07-04 RX ADMIN — DICYCLOMINE HYDROCHLORIDE 10 MG: 10 CAPSULE ORAL at 08:55

## 2024-07-04 RX ADMIN — GABAPENTIN 900 MG: 300 CAPSULE ORAL at 20:02

## 2024-07-04 RX ADMIN — AMOXICILLIN AND CLAVULANATE POTASSIUM 1 TABLET: 875; 125 TABLET, FILM COATED ORAL at 20:05

## 2024-07-04 RX ADMIN — AMOXICILLIN AND CLAVULANATE POTASSIUM 1 TABLET: 875; 125 TABLET, FILM COATED ORAL at 08:54

## 2024-07-04 RX ADMIN — GABAPENTIN 900 MG: 300 CAPSULE ORAL at 14:41

## 2024-07-04 RX ADMIN — GABAPENTIN 900 MG: 300 CAPSULE ORAL at 08:55

## 2024-07-04 RX ADMIN — ACETAMINOPHEN 975 MG: 160 SOLUTION ORAL at 06:26

## 2024-07-04 RX ADMIN — ACETAMINOPHEN 975 MG: 160 SOLUTION ORAL at 00:37

## 2024-07-04 RX ADMIN — Medication 1 CAPSULE: at 08:54

## 2024-07-04 RX ADMIN — DICYCLOMINE HYDROCHLORIDE 10 MG: 10 CAPSULE ORAL at 20:05

## 2024-07-04 RX ADMIN — ENOXAPARIN SODIUM 40 MG: 40 INJECTION SUBCUTANEOUS at 17:35

## 2024-07-04 RX ADMIN — DICYCLOMINE HYDROCHLORIDE 10 MG: 10 CAPSULE ORAL at 17:35

## 2024-07-04 RX ADMIN — CIPROFLOXACIN HYDROCHLORIDE 500 MG: 500 TABLET, FILM COATED ORAL at 08:54

## 2024-07-04 RX ADMIN — SERTRALINE 100 MG: 100 TABLET, FILM COATED ORAL at 08:54

## 2024-07-04 RX ADMIN — ONDANSETRON 4 MG: 4 TABLET, ORALLY DISINTEGRATING ORAL at 12:36

## 2024-07-04 RX ADMIN — ACETAMINOPHEN 975 MG: 160 SOLUTION ORAL at 17:37

## 2024-07-04 ASSESSMENT — ACTIVITIES OF DAILY LIVING (ADL)
ADLS_ACUITY_SCORE: 26
ADLS_ACUITY_SCORE: 25
ADLS_ACUITY_SCORE: 26
ADLS_ACUITY_SCORE: 25
ADLS_ACUITY_SCORE: 26
ADLS_ACUITY_SCORE: 26
ADLS_ACUITY_SCORE: 25
ADLS_ACUITY_SCORE: 26

## 2024-07-04 NOTE — PLAN OF CARE
"  Problem: Adult Inpatient Plan of CareTo Do:  End of Shift Summary  For vital signs and complete assessments, please see documentation flowsheets.     Pertinent assessments:   Al&o---  up in halls and room with SBA--- denies pain taking scheduled Tylenol;----co nausea x1 Zofran  given x1 -----  tolerating bites of diet and fluids well----inc clean and intact ---- voiding and stools less frequent   Major Shift Events   states feeling stronger   Treatment Plan: monitor  Bedside Nurse: Miranda Santiago RN       Goal: Plan of Care Review  Description: The Plan of Care Review/Shift note should be completed every shift.  The Outcome Evaluation is a brief statement about your assessment that the patient is improving, declining, or no change.  This information will be displayed automatically on your shift  note.  Outcome: Progressing  Flowsheets (Taken 7/4/2024 1653)  Outcome Evaluation: states feeliong stronger  Plan of Care Reviewed With: patient  Overall Patient Progress: improving  Goal: Patient-Specific Goal (Individualized)  Description: You can add care plan individualizations to a care plan. Examples of Individualization might be:  \"Parent requests to be called daily at 9am for status\", \"I have a hard time hearing out of my right ear\", or \"Do not touch me to wake me up as it startles  me\".  Outcome: Progressing  Goal: Absence of Hospital-Acquired Illness or Injury  Outcome: Progressing  Intervention: Prevent and Manage VTE (Venous Thromboembolism) Risk  Recent Flowsheet Documentation  Taken 7/4/2024 1000 by Miranda Santiago RN  VTE Prevention/Management: patient refused intervention  Intervention: Prevent Infection  Recent Flowsheet Documentation  Taken 7/4/2024 1000 by Miranda Santiago RN  Infection Prevention: hand hygiene promoted  Goal: Optimal Comfort and Wellbeing  Outcome: Progressing  Goal: Readiness for Transition of Care  Outcome: Progressing   Goal Outcome Evaluation:      Plan of Care Reviewed With: " patient    Overall Patient Progress: improvingOverall Patient Progress: improving    Outcome Evaluation: states feeliong stronger

## 2024-07-04 NOTE — PLAN OF CARE
"Goal Outcome Evaluation:      Plan of Care Reviewed With: patient    Overall Patient Progress: improvingOverall Patient Progress: improving    Outcome Evaluation: Tolerated diet, scheduled tylenol for pain control. voiding and small loose stools.  To Do:  End of Shift Summary  For vital signs and complete assessments, please see documentation flowsheets.     Pertinent assessments:  Assumed cares [id number]. A&O, VSS on RA. A&o---  up denies pain and nausea, scheduled tylenol given, up standby in the room. Voiding frequently. Having loose stools.     Major Shift Events : Uneventful    Treatment Plan: Pain and symptom management, monitor electrolytes.    Bedside Nurse: Adams Wakefield RN     Problem: Adult Inpatient Plan of Care  Goal: Plan of Care Review  Description: The Plan of Care Review/Shift note should be completed every shift.  The Outcome Evaluation is a brief statement about your assessment that the patient is improving, declining, or no change.  This information will be displayed automatically on your shift  note.  Outcome: Progressing  Flowsheets (Taken 7/4/2024 0621)  Outcome Evaluation: Tolerated diet, scheduled tylenol for pain control. voiding and small loose stools.  Plan of Care Reviewed With: patient  Overall Patient Progress: improving  Goal: Patient-Specific Goal (Individualized)  Description: You can add care plan individualizations to a care plan. Examples of Individualization might be:  \"Parent requests to be called daily at 9am for status\", \"I have a hard time hearing out of my right ear\", or \"Do not touch me to wake me up as it startles  me\".  Outcome: Progressing  Goal: Absence of Hospital-Acquired Illness or Injury  Outcome: Progressing  Intervention: Identify and Manage Fall Risk  Recent Flowsheet Documentation  Taken 7/4/2024 0046 by Adams Wakefield, RN  Safety Promotion/Fall Prevention:   activity supervised   assistive device/personal items within reach  Intervention: Prevent Skin " Injury  Recent Flowsheet Documentation  Taken 7/4/2024 0330 by Adams Wakefield RN  Body Position: position changed independently  Taken 7/4/2024 0046 by Adams Wakefield RN  Body Position: position changed independently  Intervention: Prevent Infection  Recent Flowsheet Documentation  Taken 7/4/2024 0046 by Adams Wakefield RN  Infection Prevention: hand hygiene promoted  Goal: Optimal Comfort and Wellbeing  Outcome: Progressing  Intervention: Monitor Pain and Promote Comfort  Recent Flowsheet Documentation  Taken 7/4/2024 0046 by Adams Wakefield RN  Pain Management Interventions: medication (see MAR)  Goal: Readiness for Transition of Care  Outcome: Progressing     Problem: Bowel Disease, Inflammatory (Ulcerative Colitis or Crohn's Disease)  Goal: Optimal Adaptation to Chronic Illness  Outcome: Progressing  Goal: Absence of Infection Signs and Symptoms  Outcome: Progressing  Goal: Optimal Nutrition Delivery  Outcome: Progressing  Goal: Optimal Pain Control and Function  Outcome: Progressing  Intervention: Prevent or Manage Pain  Recent Flowsheet Documentation  Taken 7/4/2024 0046 by Adams Wakefield RN  Pain Management Interventions: medication (see MAR)     Problem: Comorbidity Management  Goal: Blood Glucose Levels Within Targeted Range  Outcome: Progressing  Intervention: Monitor and Manage Glycemia  Recent Flowsheet Documentation  Taken 7/4/2024 0046 by Adams Wakefield RN  Medication Review/Management: medications reviewed

## 2024-07-04 NOTE — PROGRESS NOTES
[facility]    Hospitalist Progress Note  Name: Yoselin Zelaya    MRN: [id number]  Provider: Ade Lester MD  Date of Service: 07/04/2024    Assessment & Plan   Summary of Stay: Yoselin Zelaya is a 73 year old female who was admitted on 6/23/2024. Patient with a history of chronic pain, PHIL, and previous gastric bypass surgery presented with abdominal pain and was found to have a small bowel obstruction causing bowel ischemia and perforation, requiring an ex lap with small bowel resection on 06/28. Postoperatively, she is advancing her diet, with the NG tube removed on 07/01, and can switch from IV to oral antibiotics. She is experiencing diarrhea, likely due to the postoperative state or antibiotics, C. difficile negative. Her peritonitis due to bowel perforation is being managed with a switch from Zosyn to Augmentin and ciprofloxacin, with a planned 14-day course. Other managed issues include chronic neuropathy, hypokalemia, hypophosphatemia, resolving hyponatremia, and PHIL/MDD, with follow-ups planned for a pulmonary nodule and nocturnal hypoxia.     7/3/2024 general surgery is following and patient surgically feels ready for discharge however continues to have 20+ loose stools , patient is weak and dizzy and has hypokalemia.    7/4/2024: Still has many loose stools about 5 since morning.  Overall feels it is slowing down and improving.  Feels weak.    Small Bowel Obstruction Causing Bowel Ischemia  -Presents with abdominal pain and found to have a small bowel obstruction potentially causing bowel ischemia  -During surgery, evidence of adhesion causing obstruction, bowel ischemia, and perforation s/p bowel resection  -Now on low fiber diet  -Pain regimen in place  -General surgery following, appreciate recommendations     Diarrhea  -May be due to postoperative state and/or antibiotics   -Negative for C. Difficile  -Supportive care for now, would avoid antidiarrheal meds if we can        Peritonitis  -Due to bowel perforation  -Cx from surgery polymicrobial, originally on Zosyn  -E. coli resistant to Zosyn, no single agent to cover all of these organisms so started on concurrent cipro  PLAN  -Zosyn --> Augmentin, Ciprofloxacin IV --> Oral, 14d course should be sufficient      Other issues  -Chronic neuropathy: Continue home gabapentin  -Hypokalemia: Replacement protocol  -Hypophosphatemia: Replacement protocol  -Hyponatremia: Resolving  -PHIL and MDD: Home medications  -Pulmonary nodule: Follow-up with PCP as an outpatient  -Nocturnal hypoxia: Will refer for sleep study as an outpatient at discharge         DVT Prophylaxis: Enoxaparin (Lovenox) SQ  Code Status: Full Code    Disposition: Expected discharge in 1 to 2 days when diarrhea improves and electrolytes are stable      Interval History   Reviewed chart.  Patient complaining of good loose stools 6-7 since morning planes of generalized malaise and weakness due to frequent BMs.  More than 10 point review of system was carried out was otherwise negative.  -Data reviewed today: I reviewed all new labs and imaging reports over the last 24 hours. I personally reviewed no images or EKG's today.    Physical Exam   Temp: 98.6  F (37  C) Temp src: Oral BP: 138/53 Pulse: 93   Resp: 16 SpO2: 95 % O2 Device: None (Room air)    Vitals:    06/29/24 0553 07/01/24 0549 07/03/24 0515   Weight: 89.1 kg (196 lb 6.9 oz) 92 kg (202 lb 13.2 oz) 84.4 kg (186 lb 1.6 oz)     Vital Signs with Ranges  Temp:  [98.5  F (36.9  C)-99  F (37.2  C)] 98.6  F (37  C)  Pulse:  [82-93] 93  Resp:  [16-18] 16  BP: (138-154)/(53-70) 138/53  SpO2:  [94 %-97 %] 95 %  No intake/output data recorded.      GEN:  Alert, oriented x 3, appears comfortable, NAD.  HEENT:  Normocephalic/atraumatic, no scleral icterus, no nasal discharge, mouth moist.  CV:  Regular rate and rhythm, no murmur or JVD.  S1 + S2 noted, no S3 or S4.  LUNGS:  Clear to auscultation bilaterally without  rales/rhonchi/wheezing/retractions.  Symmetric chest rise on inhalation noted.  ABD:  Active bowel sounds, soft, minimal abdominal tenderness.  No rebound/guarding/rigidity.  EXT:  No edema.  No cyanosis.  No joint synovitis noted.  SKIN:  Dry to touch, no exanthems noted in the visualized areas.    Medications   Current Facility-Administered Medications   Medication Dose Route Frequency Provider Last Rate Last Admin     Current Facility-Administered Medications   Medication Dose Route Frequency Provider Last Rate Last Admin    acetaminophen (TYLENOL) oral liquid 975 mg  975 mg Oral Q6H Miah Self MD   975 mg at 07/04/24 1232    amoxicillin-clavulanate (AUGMENTIN) 875-125 MG per tablet 1 tablet  1 tablet Oral Q12H [facility] (08/20) Miah Self MD   1 tablet at 07/04/24 0854    ciprofloxacin (CIPRO) tablet 500 mg  500 mg Oral Q12H [facility] (08/20) Miah Self MD   500 mg at 07/04/24 0854    dicyclomine (BENTYL) capsule 10 mg  10 mg Oral 4x Daily Enoch Duarte MD   10 mg at 07/04/24 1233    enoxaparin ANTICOAGULANT (LOVENOX) injection 40 mg  40 mg Subcutaneous Q24H Enoch Duarte MD   40 mg at 07/03/24 1624    gabapentin (NEURONTIN) capsule 900 mg  900 mg Oral TID Encoh Duarte MD   900 mg at 07/04/24 0855    lactobacillus rhamnosus (GG) (CULTURELL) capsule 1 capsule  1 capsule Oral BID Enoch Duarte MD   1 capsule at 07/04/24 0854    sertraline (ZOLOFT) tablet 100 mg  100 mg Oral QAM Enoch Duarte MD   100 mg at 07/04/24 0854    sodium chloride (PF) 0.9% PF flush 3 mL  3 mL Intracatheter Q8H Enoch Duarte MD   3 mL at 07/04/24 0857     Data     Recent Labs   Lab 07/04/24  0613 07/03/24  0610 07/02/24  0709   WBC 10.7 9.6 11.2*   HGB 10.3* 9.2* 9.3*   HCT 31.6* 28.6* 27.5*   MCV 92 90 89   * 496* 450     Recent Labs   Lab 07/04/24  0613 07/03/24  1146 07/03/24  0610 07/02/24  1459 07/02/24  0709     --  135  --  134*   POTASSIUM 3.6 3.5 3.2*    < > 3.0*   CHLORIDE 97*  --  96*  --  100   CO2 23  --  24  --  22   ANIONGAP 15  --  15  --  12   *  --  99  --  104*   BUN 2.2*  --  1.7*  --  2.3*   CR 0.57  --  0.56  --  0.58   GFRESTIMATED >90  --  >90  --  >90   ERICK 8.5*  --  8.2*  --  7.5*    < > = values in this interval not displayed.     7-Day Micro Results       Collected Updated Procedure Result Status      07/02/2024 1046 07/02/2024 1514 C. difficile Toxin B PCR with reflex to C. difficile Antigen and Toxins A/B EIA [[id number]]    Stool from Per Rectum    Final result Component Value   C Difficile Toxin B by PCR Negative   A negative result does not exclude actual disease due to C. difficile and may be due to improper collection, handling and storage of the specimen or the number of organisms in the specimen is below the detection limit of the assay.            06/28/2024 1413 07/02/2024 0836 Anaerobic Bacterial Culture Routine [[id number]]   (Abnormal)   Peritoneal Fluid from Peritoneum    Final result Component Value   Culture 4+ Prevotella buccae    Susceptibilities not routinely done, refer to antibiogram to view typical susceptibility profiles    4+ Prevotella species    Identification obtained by MALDI-TOF mass spectrometry research use only database. Test characteristics determined and verified by the [facility].  Susceptibilities not routinely done, refer to antibiogram to view typical susceptibility profiles    4+ Mixed Aerobic and Anaerobic josue               06/28/2024 1413 07/02/2024 1312 Peritoneal Fluid Aerobic Bacterial Culture Routine [[id number]]    (Abnormal)   Peritoneal Fluid from Peritoneum    Final result Component Value   Culture 4+ Klebsiella oxytoca    4+ Aeromonas veronii biovar sobria    3+ Enterococcus faecalis    2+ Escherichia coli    4+ Normal josue        Susceptibility        Klebsiella oxytoca      BELLA      Ampicillin  Resistant  [1]       Ampicillin/ Sulbactam 8 ug/mL  Susceptible      Cefepime <=1 ug/mL Susceptible      Ceftazidime <=1 ug/mL Susceptible      Ceftriaxone <=1 ug/mL Susceptible      Ciprofloxacin <=0.25 ug/mL Susceptible      Gentamicin <=1 ug/mL Susceptible      Levofloxacin <=0.12 ug/mL Susceptible      Meropenem <=0.25 ug/mL Susceptible      Piperacillin/Tazobactam <=4 ug/mL Susceptible      Tobramycin <=1 ug/mL Susceptible      Trimethoprim/Sulfamethoxazole <=1/19 ug/mL Susceptible                   [1]  Intrinsically Resistant               Susceptibility        Aeromonas veronii biovar sobria      BELLA      Amikacin <=8  Susceptible      Ampicillin >16  No interpretation available      Ampicillin/ Sulbactam >16  No interpretation available      Cefazolin 8  No interpretation available      Cefepime <=2  Susceptible      Ceftazidime <=1  Susceptible      Ceftazidime Avibactam <=2  No interpretation available      Ceftolozane/Tazobactam <=2  No interpretation available      Ceftriaxone <=0.5  Susceptible      Ciprofloxacin <=0.25  Susceptible      Gentamicin <=2  Susceptible      Levofloxacin <=0.5  Susceptible      Meropenem <=0.5  Susceptible      Minocycline <=1        Nitrofurantoin <=32  No interpretation available      Piperacillin/Tazobactam <=8  Susceptible      Tetracycline <=4        Tigecycline <=1  No interpretation available      Trimethoprim/Sulfamethoxazole <=2/38  Susceptible                      Susceptibility        Enterococcus faecalis      BELLA      Ampicillin <=2 ug/mL Susceptible      Gentamicin Synergy Susceptible ug/mL Susceptible  [1]       Vancomycin 1 ug/mL Susceptible                   [1]  No high level gentamicin resistance found - therefore combination therapy with an aminoglycoside may be indicated for serious enterococcal infections such as bacteremia and endocarditis.               Susceptibility        Escherichia coli      BELLA      Ampicillin 8 ug/mL Susceptible      Ampicillin/ Sulbactam 4 ug/mL Susceptible      Cefepime <=1  "ug/mL Susceptible      Ceftazidime <=1 ug/mL Susceptible      Ceftriaxone <=1 ug/mL Susceptible      Ciprofloxacin <=0.25 ug/mL Susceptible      Gentamicin <=1 ug/mL Susceptible      Levofloxacin <=0.12 ug/mL Susceptible      Meropenem <=0.25 ug/mL Susceptible      Piperacillin/Tazobactam 32 ug/mL Resistant      Tobramycin <=1 ug/mL Susceptible      Trimethoprim/Sulfamethoxazole <=1/19 ug/mL Susceptible                      Susceptibility Comments       Aeromonas veronii biovar sobria    Antibiotics listed as \"No Interpretation\" have no regulatory guidelines for susceptibility/resistance available.  Previously reported component [ Tobramycin ] is no longer reported.\"      Escherichia coli    Additional susceptibilities in progress.                       No results for input(s): \"SED\", \"CRP\" in the last 168 hours.  GFR Estimate   Date Value Ref Range Status   07/04/2024 >90 >60 mL/min/1.73m2 Final     Comment:     eGFR calculated using 2021 CKD-EPI equation.   07/03/2024 >90 >60 mL/min/1.73m2 Final     Comment:     eGFR calculated using 2021 CKD-EPI equation.   07/02/2024 >90 >60 mL/min/1.73m2 Final     Comment:     eGFR calculated using 2021 CKD-EPI equation.     No results found for: \"GFRESTBLACK\"  Recent Labs   Lab 07/04/24  0613 07/03/24  0610 07/02/24  0709 07/01/24  0726 07/01/24  0700   * 99 104* 96 95     Recent Labs   Lab 07/04/24  0613 07/03/24  0610 07/02/24  0709   HGB 10.3* 9.2* 9.3*     No results for input(s): \"AST\", \"ALT\", \"GGT\", \"ALKPHOS\", \"BILITOTAL\", \"BILICONJ\", \"BILIDIRECT\", \"KAYLIN\" in the last 168 hours.    Invalid input(s): \"BILIRUBININDIRECT\"  No results for input(s): \"INR\" in the last 168 hours.  No results for input(s): \"LACT\" in the last 168 hours.    No results for input(s): \"LIPASE\" in the last 168 hours.  Recent Labs   Lab 07/04/24  0613 07/03/24  0610 07/02/24  0709   BUN 2.2* 1.7* 2.3*   CR 0.57 0.56 0.58     No results for input(s): \"TSH\" in the last 168 hours.  No results for " "input(s): \"TROPONIN\", \"TROPI\", \"TROPR\", \"TROPONINIS\" in the last 168 hours.    Invalid input(s): \"TROPT\", \"TROP\", \"TROPONINIES\", \"TNIH\"  No results for input(s): \"COLOR\", \"APPEARANCE\", \"URINEGLC\", \"URINEBILI\", \"URINEKETONE\", \"SG\", \"UBLD\", \"URINEPH\", \"PROTEIN\", \"UROBILINOGEN\", \"NITRITE\", \"LEUKEST\", \"RBCU\", \"WBCU\" in the last 168 hours.    No results found for this or any previous visit (from the past 24 hour(s)).       "

## 2024-07-04 NOTE — PROGRESS NOTES
Surgery note:     Sleeping on my visit this morning and I did not wake her. Labs reviewed and are good and stable. No new recommendations from surgery. Possible discharge today or tomorrow.     Enoch Duarte MD

## 2024-07-05 LAB
ANION GAP SERPL CALCULATED.3IONS-SCNC: 12 MMOL/L (ref 7–15)
BUN SERPL-MCNC: 4.9 MG/DL (ref 8–23)
CALCIUM SERPL-MCNC: 8.6 MG/DL (ref 8.8–10.2)
CHLORIDE SERPL-SCNC: 101 MMOL/L (ref 98–107)
CREAT SERPL-MCNC: 0.64 MG/DL (ref 0.51–0.95)
DEPRECATED HCO3 PLAS-SCNC: 24 MMOL/L (ref 22–29)
EGFRCR SERPLBLD CKD-EPI 2021: >90 ML/MIN/1.73M2
GLUCOSE SERPL-MCNC: 122 MG/DL (ref 70–99)
POTASSIUM SERPL-SCNC: 3.7 MMOL/L (ref 3.4–5.3)
SODIUM SERPL-SCNC: 137 MMOL/L (ref 135–145)

## 2024-07-05 PROCEDURE — 250N000013 HC RX MED GY IP 250 OP 250 PS 637: Performed by: STUDENT IN AN ORGANIZED HEALTH CARE EDUCATION/TRAINING PROGRAM

## 2024-07-05 PROCEDURE — 80048 BASIC METABOLIC PNL TOTAL CA: CPT | Performed by: INTERNAL MEDICINE

## 2024-07-05 PROCEDURE — 250N000011 HC RX IP 250 OP 636: Performed by: STUDENT IN AN ORGANIZED HEALTH CARE EDUCATION/TRAINING PROGRAM

## 2024-07-05 PROCEDURE — 250N000013 HC RX MED GY IP 250 OP 250 PS 637: Performed by: PHYSICIAN ASSISTANT

## 2024-07-05 PROCEDURE — 36415 COLL VENOUS BLD VENIPUNCTURE: CPT | Performed by: INTERNAL MEDICINE

## 2024-07-05 PROCEDURE — 99232 SBSQ HOSP IP/OBS MODERATE 35: CPT | Performed by: INTERNAL MEDICINE

## 2024-07-05 PROCEDURE — 120N000001 HC R&B MED SURG/OB

## 2024-07-05 PROCEDURE — 250N000013 HC RX MED GY IP 250 OP 250 PS 637: Performed by: INTERNAL MEDICINE

## 2024-07-05 RX ORDER — SACCHAROMYCES BOULARDII 250 MG
250 CAPSULE ORAL 2 TIMES DAILY
Qty: 60 CAPSULE | Refills: 0 | Status: SHIPPED | OUTPATIENT
Start: 2024-07-05 | End: 2024-08-04

## 2024-07-05 RX ORDER — DICYCLOMINE HYDROCHLORIDE 10 MG/1
10 CAPSULE ORAL 4 TIMES DAILY
Qty: 120 CAPSULE | Refills: 0 | Status: SHIPPED | OUTPATIENT
Start: 2024-07-05 | End: 2024-08-04

## 2024-07-05 RX ORDER — SACCHAROMYCES BOULARDII 250 MG
250 CAPSULE ORAL 2 TIMES DAILY
Status: DISCONTINUED | OUTPATIENT
Start: 2024-07-05 | End: 2024-07-06 | Stop reason: HOSPADM

## 2024-07-05 RX ORDER — LOPERAMIDE HCL 2 MG
2 CAPSULE ORAL 4 TIMES DAILY
Qty: 28 CAPSULE | Refills: 0 | Status: SHIPPED | OUTPATIENT
Start: 2024-07-05 | End: 2024-07-12

## 2024-07-05 RX ORDER — LOPERAMIDE HCL 2 MG
2 CAPSULE ORAL 4 TIMES DAILY
Status: DISCONTINUED | OUTPATIENT
Start: 2024-07-05 | End: 2024-07-06 | Stop reason: HOSPADM

## 2024-07-05 RX ORDER — LACTOBACILLUS RHAMNOSUS GG 10B CELL
1 CAPSULE ORAL 2 TIMES DAILY
COMMUNITY
Start: 2024-07-05 | End: 2024-08-04

## 2024-07-05 RX ADMIN — DICYCLOMINE HYDROCHLORIDE 10 MG: 10 CAPSULE ORAL at 21:42

## 2024-07-05 RX ADMIN — GABAPENTIN 900 MG: 300 CAPSULE ORAL at 21:42

## 2024-07-05 RX ADMIN — ACETAMINOPHEN 975 MG: 160 SOLUTION ORAL at 00:08

## 2024-07-05 RX ADMIN — Medication 1 CAPSULE: at 09:38

## 2024-07-05 RX ADMIN — GABAPENTIN 900 MG: 300 CAPSULE ORAL at 09:37

## 2024-07-05 RX ADMIN — DICYCLOMINE HYDROCHLORIDE 10 MG: 10 CAPSULE ORAL at 12:44

## 2024-07-05 RX ADMIN — ACETAMINOPHEN 975 MG: 160 SOLUTION ORAL at 05:59

## 2024-07-05 RX ADMIN — AMOXICILLIN AND CLAVULANATE POTASSIUM 1 TABLET: 875; 125 TABLET, FILM COATED ORAL at 09:38

## 2024-07-05 RX ADMIN — LOPERAMIDE HYDROCHLORIDE 2 MG: 2 CAPSULE ORAL at 17:18

## 2024-07-05 RX ADMIN — Medication 250 MG: at 13:01

## 2024-07-05 RX ADMIN — Medication 1 CAPSULE: at 21:42

## 2024-07-05 RX ADMIN — ACETAMINOPHEN 975 MG: 160 SOLUTION ORAL at 17:17

## 2024-07-05 RX ADMIN — DICYCLOMINE HYDROCHLORIDE 10 MG: 10 CAPSULE ORAL at 09:39

## 2024-07-05 RX ADMIN — LOPERAMIDE HYDROCHLORIDE 2 MG: 2 CAPSULE ORAL at 21:42

## 2024-07-05 RX ADMIN — LOPERAMIDE HYDROCHLORIDE 2 MG: 2 CAPSULE ORAL at 12:44

## 2024-07-05 RX ADMIN — GABAPENTIN 900 MG: 300 CAPSULE ORAL at 13:01

## 2024-07-05 RX ADMIN — Medication 250 MG: at 21:42

## 2024-07-05 RX ADMIN — CIPROFLOXACIN HYDROCHLORIDE 500 MG: 500 TABLET, FILM COATED ORAL at 09:38

## 2024-07-05 RX ADMIN — ENOXAPARIN SODIUM 40 MG: 40 INJECTION SUBCUTANEOUS at 17:17

## 2024-07-05 RX ADMIN — DICYCLOMINE HYDROCHLORIDE 10 MG: 10 CAPSULE ORAL at 17:18

## 2024-07-05 RX ADMIN — SERTRALINE 100 MG: 100 TABLET, FILM COATED ORAL at 09:43

## 2024-07-05 ASSESSMENT — ACTIVITIES OF DAILY LIVING (ADL)
ADLS_ACUITY_SCORE: 25
ADLS_ACUITY_SCORE: 26
ADLS_ACUITY_SCORE: 26
ADLS_ACUITY_SCORE: 25
ADLS_ACUITY_SCORE: 26
ADLS_ACUITY_SCORE: 25
ADLS_ACUITY_SCORE: 26
ADLS_ACUITY_SCORE: 25
ADLS_ACUITY_SCORE: 26
ADLS_ACUITY_SCORE: 25
ADLS_ACUITY_SCORE: 26
ADLS_ACUITY_SCORE: 25
ADLS_ACUITY_SCORE: 26
ADLS_ACUITY_SCORE: 25
ADLS_ACUITY_SCORE: 26
ADLS_ACUITY_SCORE: 25
ADLS_ACUITY_SCORE: 25
ADLS_ACUITY_SCORE: 26

## 2024-07-05 NOTE — PROGRESS NOTES
"[facility]   General Surgery Progress Note          Assessment and Plan:   Assessment:   POD#7 s/p diagnostic laparoscopy converted to laparotomy, lysis of adhesions and small bowel resection. Adhesive band in the pelvis causing complete SBO causing perforation.  -Postoperative ileus as expected - resolved (+BMs)  -Diarrhea, ongoing. C diff negative      Plan:   -Diet: low res diet, 6 small meals  -Discontinue antibiotics  -Imodium  -Nutrition consult per patient request (history of bariatric surgery)  -Pain Management: tylenol  -DVT prophylaxis: lovenox  -Increase activity as tolerated, sit up in chair and walk halls as able today  -Dispo: Meeting surgical criteria for discharge, await clearance from medical perspective. Possible discharge today. Staples out late next week. Discharge instructions in chart.         Interval History:   Sitting up in bed, doing well. Denies pain. Discomfort controlled with tylenol. Tolerating low-res diet. She continues to have loose stool, does not want to go home with diarrhea, asks to see nutritionist. Up walking the halls. Voiding independently.          Physical Exam:   Blood pressure (!) 152/66, pulse 93, temperature 97.8  F (36.6  C), temperature source Oral, resp. rate 16, height 1.6 m (5' 3\"), weight 79.5 kg (175 lb 3.2 oz), SpO2 92%.    No intake/output data recorded.    Abdomen: soft, mildly distended, non-tender  Inc(s) - incisions c/d/I, staples in place, no drainage or erythema          Data:     Recent Labs   Lab 07/04/24  0613 07/03/24  0610 07/02/24  0709   WBC 10.7 9.6 11.2*   HGB 10.3* 9.2* 9.3*   HCT 31.6* 28.6* 27.5*   MCV 92 90 89   * 496* 450         Recent Labs   Lab 07/05/24  0644 07/04/24  0613 07/03/24  1146 07/03/24  0610    135  --  135   POTASSIUM 3.7 3.6 3.5 3.2*   CHLORIDE 101 97*  --  96*   CO2 24 23  --  24   ANIONGAP 12 15  --  15   * 103*  --  99   BUN 4.9* 2.2*  --  1.7*   CR 0.64 0.57  --  0.56   GFRESTIMATED >90 " >90  --  >90   ERICK 8.6* 8.5*  --  8.2*          Nir Woodard PA-C  Text page [phone] 8am-4pm  After 4pm call [phone]

## 2024-07-05 NOTE — PLAN OF CARE
"Goal Outcome Evaluation:      Plan of Care Reviewed With: patient    Overall Patient Progress: improvingOverall Patient Progress: improving    Outcome Evaluation: Ambulating independently to the bathroom. No nausea, taking scheduled tylenol for pain.  To Do:  End of Shift Summary  For vital signs and complete assessments, please see documentation flowsheets.     Pertinent assessments:  Assumed cares @ 4170-3124. A&O x 4, VSS on RA.  Up in the room with SBA. Denies pain and nausea taking scheduled Tylenol,  tolerating bites of diet and fluids well. Voiding ok and stools less frequent     Major Shift Events: Feeling better, showered and at bedtime, linens changed.    Treatment Plan:Encourage ambulation and activity, Scheduled tylenol for pain, and symptom management.    Bedside Nurse: Adams Wakefield RN       Problem: Adult Inpatient Plan of Care  Goal: Plan of Care Review  Description: The Plan of Care Review/Shift note should be completed every shift.  The Outcome Evaluation is a brief statement about your assessment that the patient is improving, declining, or no change.  This information will be displayed automatically on your shift  note.  Outcome: Progressing  Flowsheets (Taken 7/5/2024 0604)  Outcome Evaluation: Ambulating independently to the bathroom. No nausea, taking scheduled tylenol for pain.  Plan of Care Reviewed With: patient  Overall Patient Progress: improving  Goal: Patient-Specific Goal (Individualized)  Description: You can add care plan individualizations to a care plan. Examples of Individualization might be:  \"Parent requests to be called daily at 9am for status\", \"I have a hard time hearing out of my right ear\", or \"Do not touch me to wake me up as it startles  me\".  Outcome: Progressing  Goal: Absence of Hospital-Acquired Illness or Injury  Outcome: Progressing  Intervention: Identify and Manage Fall Risk  Recent Flowsheet Documentation  Taken 7/4/2024 1956 by Adams Wakefield RN  Safety " Promotion/Fall Prevention: activity supervised  Intervention: Prevent Skin Injury  Recent Flowsheet Documentation  Taken 7/4/2024 1956 by Adams Wakefield RN  Body Position: position changed independently  Intervention: Prevent and Manage VTE (Venous Thromboembolism) Risk  Recent Flowsheet Documentation  Taken 7/4/2024 1956 by Adams Wakefield RN  VTE Prevention/Management: patient refused intervention  Intervention: Prevent Infection  Recent Flowsheet Documentation  Taken 7/4/2024 1956 by Adams Wakefield RN  Infection Prevention: hand hygiene promoted  Goal: Optimal Comfort and Wellbeing  Outcome: Progressing  Intervention: Monitor Pain and Promote Comfort  Recent Flowsheet Documentation  Taken 7/4/2024 1956 by Adams Wakefield RN  Pain Management Interventions: medication (see MAR)  Goal: Readiness for Transition of Care  Outcome: Progressing     Problem: Bowel Disease, Inflammatory (Ulcerative Colitis or Crohn's Disease)  Goal: Optimal Adaptation to Chronic Illness  Outcome: Progressing  Goal: Absence of Infection Signs and Symptoms  Outcome: Progressing  Goal: Optimal Nutrition Delivery  Outcome: Progressing  Goal: Optimal Pain Control and Function  Outcome: Progressing  Intervention: Prevent or Manage Pain  Recent Flowsheet Documentation  Taken 7/4/2024 1956 by Adams Wakefield RN  Pain Management Interventions: medication (see MAR)     Problem: Comorbidity Management  Goal: Blood Glucose Levels Within Targeted Range  Outcome: Progressing  Intervention: Monitor and Manage Glycemia  Recent Flowsheet Documentation  Taken 7/4/2024 1956 by Adams Wakefield RN  Medication Review/Management: medications reviewed

## 2024-07-05 NOTE — PLAN OF CARE
Physical Therapy Discharge Summary    Reason for therapy discharge:    Discharged to home with home therapy.    Progress towards therapy goal(s). See goals on Care Plan in [facility] electronic health record for goal details.  Goals partially met.  Barriers to achieving goals:   limited tolerance for therapy and discharge from facility.    Therapy recommendation(s):    Continued therapy is recommended.  Rationale/Recommendations:  Home PT and assist from family to maximize return to PLOF.

## 2024-07-05 NOTE — PLAN OF CARE
"   Pertinent assessments:  Assumed cares @ 5624-8664. A&O x 4, VSS on RA.  Up independent in  room. Denies pain. Declining  scheduled Liquid Tylenol. Tolerating low fiber small portions at a time, denies N/V/C, endorsing diarrhea,scheduled antidiarrhea medications administered. Midline and 3 lap sited CDI, no drainage noted. PIV SL    Major Shift Events: Feeling better, but still reporting loose stools, antidiarrhea medications administered    Treatment Plan:Encourage ambulation and activity, pain/symptom management.    Problem: Adult Inpatient Plan of Care  Goal: Plan of Care Review  Description: The Plan of Care Review/Shift note should be completed every shift.  The Outcome Evaluation is a brief statement about your assessment that the patient is improving, declining, or no change.  This information will be displayed automatically on your shift  note.  Outcome: Progressing  Flowsheets (Taken 7/5/2024 1432)  Outcome Evaluation: loose stools, denies N/V. denies pain, declined scheduled tylenol  Plan of Care Reviewed With: patient  Overall Patient Progress: no change  Goal: Patient-Specific Goal (Individualized)  Description: You can add care plan individualizations to a care plan. Examples of Individualization might be:  \"Parent requests to be called daily at 9am for status\", \"I have a hard time hearing out of my right ear\", or \"Do not touch me to wake me up as it startles  me\".  Outcome: Progressing  Goal: Absence of Hospital-Acquired Illness or Injury  Outcome: Progressing  Intervention: Identify and Manage Fall Risk  Recent Flowsheet Documentation  Taken 7/5/2024 0944 by Micheal Tran, RN  Safety Promotion/Fall Prevention:   clutter free environment maintained   lighting adjusted   mobility aid in reach   nonskid shoes/slippers when out of bed  Intervention: Prevent Skin Injury  Recent Flowsheet Documentation  Taken 7/5/2024 0944 by Micheal Tran, RN  Body Position: position changed " independently  Intervention: Prevent and Manage VTE (Venous Thromboembolism) Risk  Recent Flowsheet Documentation  Taken 7/5/2024 0944 by Micheal Tran, RN  VTE Prevention/Management: patient refused intervention  Intervention: Prevent Infection  Recent Flowsheet Documentation  Taken 7/5/2024 0944 by Micheal Tran, RN  Infection Prevention: hand hygiene promoted  Goal: Optimal Comfort and Wellbeing  Outcome: Progressing  Goal: Readiness for Transition of Care  Outcome: Progressing   Goal Outcome Evaluation:      Plan of Care Reviewed With: patient    Overall Patient Progress: no changeOverall Patient Progress: no change    Outcome Evaluation: loose stools, denies N/V. denies pain, declined scheduled tylenol

## 2024-07-05 NOTE — DISCHARGE SUMMARY
[facility]  Discharge Summary  Hospitalist      Date of Admission:  6/23/2024  Date of Discharge:  7/5/2024  Provider:  Ade Lester MD  Date of Service (when I last saw the patient): 07/05/24      Primary Provider: Arvind Aquino          Discharge Diagnosis:     Discharge Diagnoses   Small Bowel Obstruction Causing Bowel Ischemia   Diarrhea   Peritonitis     Other medical issues:  Past Medical History:   Diagnosis Date    Depressive disorder     Hypertension     PONV (postoperative nausea and vomiting)           History of Present Illness   Yoselin Zelaya is an 73 year old female who presented with abdominal pain.  Please see the admission history and physical for full details.    Hospital Course     Yoselin Zelaya is a 73 year old female who was admitted on 6/23/2024. Patient with a history of chronic pain, PHIL, and previous gastric bypass surgery presented with abdominal pain and was found to have a small bowel obstruction causing bowel ischemia and perforation, requiring an ex lap with small bowel resection on 06/28. Postoperatively, she is advancing her diet, with the NG tube removed on 07/01, and can switch from IV to oral antibiotics. She is experiencing diarrhea, likely due to the postoperative state or antibiotics, C. difficile negative. Her peritonitis due to bowel perforation is being managed with a switch from Zosyn to Augmentin and ciprofloxacin, with a planned 14-day course. Other managed issues include chronic neuropathy, hypokalemia, hypophosphatemia, resolving hyponatremia, and PHIL/MDD, with follow-ups planned for a pulmonary nodule and nocturnal hypoxia. .  The following problems were addressed during her hospitalization:    Small Bowel Obstruction Causing Bowel Ischemia  -Presents with abdominal pain and found to have a small bowel obstruction potentially causing bowel ischemia  -During surgery, evidence of adhesion causing obstruction, bowel ischemia,  and perforation s/p bowel resection  -Now on low fiber diet  -Pain regimen in place  -Follow-up with surgery as outpatient     Diarrhea  -Patient with loose stools improving in frequency.  She does have history of bypass surgery and possibly concern for short gut syndrome.  -Added Imodium per surgery  -Nutrition consult.  Added Florastor  -Negative for C. Difficile  -Supportive care  -Follow-up with PCP in 3 to 5 days with repeat basic metabolic panel        Peritonitis  -Due to bowel perforation  -Cx from surgery polymicrobial, originally on Zosyn  -E. coli resistant to Zosyn, no single agent to cover all of these organisms so started on concurrent cipro  PLAN  -Zosyn --> Augmentin, Ciprofloxacin IV --> Oral, 14d course should be sufficient off antibiotics now     Other issues  -Chronic neuropathy: Continue home gabapentin  -Hypokalemia: Replacement protocol  -Hypophosphatemia: Replacement protocol  -Hyponatremia: Resolving  -PHIL and MDD: Home medications  -Pulmonary nodule: Follow-up with PCP as an outpatient  -Nocturnal hypoxia: Will refer for sleep study as an outpatient at discharge          Significant Results and Procedures   As noted above    Pending Results   Unresulted Labs Ordered in the Past 30 Days of this Admission       No orders found from 5/24/2024 to 6/24/2024.            Code Status   Full Code       Primary Care Physician   Arvind Aquino    Physical Exam   Temp: 97.8  F (36.6  C) Temp src: Oral BP: (!) 152/66 Pulse: 93   Resp: 16 SpO2: 92 % O2 Device: None (Room air)    Vitals:    07/01/24 0549 07/03/24 0515 07/05/24 0610   Weight: 92 kg (202 lb 13.2 oz) 84.4 kg (186 lb 1.6 oz) 79.5 kg (175 lb 3.2 oz)     Vital Signs with Ranges  Temp:  [97.8  F (36.6  C)-98.8  F (37.1  C)] 97.8  F (36.6  C)  Pulse:  [82-93] 93  Resp:  [16] 16  BP: (144-152)/(58-66) 152/66  SpO2:  [92 %-93 %] 92 %  No intake/output data recorded.    GEN:  Alert, oriented x 3, appears comfortable, NAD.  HEENT:   Normocephalic/atraumatic, no scleral icterus, no nasal discharge, mouth moist.  CV:  Regular rate and rhythm, no murmur or JVD.  S1 + S2 noted, no S3 or S4.  LUNGS:  Clear to auscultation bilaterally without rales/rhonchi/wheezing/retractions.  Symmetric chest rise on inhalation noted.  ABD:  Active bowel sounds, soft, minimal abdominal tenderness.  No rebound/guarding/rigidity.  EXT:  No edema.  No cyanosis.  No joint synovitis noted.  SKIN:  Dry to touch, no exanthems noted in the visualized areas.      Discharge Disposition   Discharged to home    Consultations This Hospital Stay   ADVANCE DIRECTIVE IP CONSULT  SURGERY GENERAL IP CONSULT  PHYSICAL THERAPY ADULT IP CONSULT  ADVANCE DIRECTIVE IP CONSULT  CARE MANAGEMENT / SOCIAL WORK IP CONSULT  SPIRITUAL HEALTH SERVICES IP CONSULT  NUTRITION SERVICES ADULT IP CONSULT  NUTRITION SERVICES ADULT IP CONSULT  ADVANCE DIRECTIVE IP CONSULT  ADVANCE DIRECTIVE IP CONSULT    Time Spent on this Encounter   I, Ade Lester MD, personally saw the patient today and spent greater than 30 minutes discharging this patient.    Discharge Orders      Reason for your hospital stay    Please refer to discharge summary.     Follow-up and recommended labs and tests     Follow up with primary care provider, Arvind Aquino, within 3-5 days for hospital follow- up.  The following labs/tests are recommended: Basic metabolic panel for ongoing diarrhea.    Follow-up with surgery as scheduled  Please call, if there are any new or worsening symptoms     Activity    Your activity upon discharge: activity as tolerated     Diet    Follow this diet upon discharge: Orders Placed This Encounter      Combination Diet Low Fiber, Six Small Feedings Adult     Discharge Medications   Current Discharge Medication List        START taking these medications    Details   dicyclomine (BENTYL) 10 MG capsule Take 1 capsule (10 mg) by mouth 4 times daily for 30 days  Qty: 120 capsule, Refills: 0     Associated Diagnoses: Diarrhea, unspecified type      lactobacillus rhamnosus, GG, (CULTURELL) capsule Take 1 capsule by mouth 2 times daily for 30 days    Associated Diagnoses: Diarrhea, unspecified type      loperamide (IMODIUM) 2 MG capsule Take 1 capsule (2 mg) by mouth 4 times daily for 7 days  Qty: 28 capsule, Refills: 0    Associated Diagnoses: Diarrhea, unspecified type      saccharomyces boulardii (FLORASTOR) 250 MG capsule Take 1 capsule (250 mg) by mouth 2 times daily for 30 days  Qty: 60 capsule, Refills: 0    Associated Diagnoses: Diarrhea, unspecified type           CONTINUE these medications which have NOT CHANGED    Details   acetaminophen (TYLENOL) 500 MG tablet Take 1,000 mg by mouth every 6 hours      Ascorbic Acid 250 MG CHEW Take 500 mg by mouth every morning      calcium-vitamin D-vitamin K (CALCIUM SOFT CHEWS) 500-500-40 MG-UNT-MCG CHEW Take 2 tablets by mouth every morning      gabapentin (NEURONTIN) 300 MG capsule Take 900 mg by mouth 3 times daily      hydrOXYzine HCl (ATARAX) 50 MG tablet Take 50 mg by mouth every 6 hours as needed      propranolol (INDERAL) 20 MG tablet Take 20 mg by mouth 3 times daily as needed (anxiety)      sertraline (ZOLOFT) 100 MG tablet Take 100 mg by mouth every morning           Allergies   Allergies   Allergen Reactions    Latex      Data   Most Recent 3 CBC's:  Recent Labs   Lab Test 07/04/24  0613 07/03/24  0610 07/02/24  0709   WBC 10.7 9.6 11.2*   HGB 10.3* 9.2* 9.3*   MCV 92 90 89   * 496* 450      Most Recent 3 BMP's:  Recent Labs   Lab Test 07/05/24  0644 07/04/24  0613 07/03/24  1146 07/03/24  0610    135  --  135   POTASSIUM 3.7 3.6 3.5 3.2*   CHLORIDE 101 97*  --  96*   CO2 24 23  --  24   BUN 4.9* 2.2*  --  1.7*   CR 0.64 0.57  --  0.56   ANIONGAP 12 15  --  15   ERICK 8.6* 8.5*  --  8.2*   * 103*  --  99     Most Recent 2 LFT's:  Recent Labs   Lab Test 06/23/24  0834   AST 21   ALT 13   ALKPHOS 63   BILITOTAL 0.2     Most Recent  INR's and Anticoagulation Dosing History:  Anticoagulation Dose History           No data to display              Most Recent 3 Troponin's:No lab results found.  Most Recent Cholesterol Panel:No lab results found.  Most Recent 6 Bacteria Isolates From Any Culture (See EPIC Reports for Culture Details):No lab results found.  Most Recent TSH, T4 and A1c Labs:  Recent Labs   Lab Test 06/24/24  0648   A1C 5.5     Results for orders placed or performed during the hospital encounter of 06/23/24   Abd/pelvis CT,  IV  contrast only TRAUMA / AAA    Narrative    EXAM: CT ABDOMEN PELVIS W CONTRAST  LOCATION: [facility]  DATE: 6/23/2024    INDICATION: Gen abd pain, hx of patrice en y, NVD  COMPARISON: None.  TECHNIQUE: CT scan of the abdomen and pelvis was performed following injection of IV contrast. Multiplanar reformats were obtained. Dose reduction techniques were used.  CONTRAST: 90mL Isovue 370    FINDINGS:   LOWER CHEST: A few small nodules in the right middle lobe measuring 2-3 mm (series 3, image 9-20). Bilateral breast prosthesis.    HEPATOBILIARY: No focal lesions in the liver. Cholecystectomy. Mild intrahepatic and extra hepatic biliary ductal dilatation, likely related to postcholecystectomy reservoir effect.    PANCREAS: Normal.    SPLEEN: Normal.    ADRENAL GLANDS: Normal.    KIDNEYS/BLADDER: Normal.    BOWEL: There is a cluster of the cord edematous loops of small bowel and a few mildly dilated loops of small bowel in the mid pelvis with bowel loops maximally dilated up to 3.4 cm. Surrounding mesenteric edema and fluid in the surrounding mesentery.   Normal caliber loops of small bowel in the upper abdomen and normal caliber of the distal and terminal ileum. No discrete transition point. Normal caliber of the colon. Mild circumferential colonic wall thickening and trace fluid around the colon,   predominantly involving the descending colon and sigmoid colon. Findings may be related to  nonspecific enterocolitis. Soren-en-Y gastric bypass.    LYMPH NODES: No lymphadenopathy.    VASCULATURE: No abdominal aortic aneurysm.    PELVIC ORGANS: Hysterectomy. Small amount of free fluid in the pelvis.  Small amount of free fluid around the right hepatic lobe.    MUSCULOSKELETAL: Bilateral breast prosthesis. Degenerative changes in the spine. Instrumented fusion in the lumbar spine. No suspicious lesions in the bones.      Impression    IMPRESSION:   1.  Findings of acute enterocolitis with a few thick-walled edematous and mildly dilated loops of small bowel in the pelvis, and a few segments of colon with wall thickening. Mesenteric edema and small amount of free fluid in the pelvis and right upper   quadrant. Findings may be due to infectious or inflammatory enterocolitis.  2.  Few small pulmonary nodules measuring up to 3 mm in the right middle lobe. Consider a follow-up chest CT in 12 months.   XR Chest Port 1 View    Narrative    EXAM: XR CHEST PORT 1 VIEW  LOCATION: [facility]  DATE: 6/26/2024    INDICATION: hypoxia  COMPARISON: CT abdomen 6/23/2024.      Impression    IMPRESSION: Low lung volumes and bibasilar atelectasis. No pleural fluid or pneumothorax. Normal heart size and pulmonary vascularity. Plate and screw fixation of the cervical spine. Bilateral breast implants.   XR Abdomen 1 View    Narrative    ABDOMEN ONE VIEW   6/26/2024 11:17 AM     HISTORY: Ileus vs small bowel obstruction.    COMPARISON: CT 6/23/2024.      Impression    IMPRESSION: Multiple dilated proximal to mid small bowel loops appear  increased in the interval. Decompressed colon. Findings may represent  bowel obstruction. No free air identified. Lumbosacral fusion.    KAYLA BYRD MD         SYSTEM ID:  [id number]   XR Abdomen Port 1 View    Narrative    ABDOMEN PORTABLE ONE VIEW   6/26/2024 3:02 PM     HISTORY: Abdominal pain. Enterocolitis.    COMPARISON: 6/26/2024.      Impression    IMPRESSION:  Nasogastric tube tip within the gastric lumen. Multiple  dilated gas-filled bowel loops again identified.    KAYLA BYRD MD         SYSTEM ID:  [id number]   XR Abdomen 1 View    Narrative    EXAM: XR ABDOMEN 1 VIEW  LOCATION: [facility]  DATE: 6/27/2024    INDICATION: increasing abdominal pain, bloating  COMPARISON: Abdominal x-ray 6/26/2024 and CT abdomen and pelvis 6/23/2024      Impression    IMPRESSION: Numerous dilated loops of air-filled small bowel again seen within the mid and lower abdomen similar to the previous study and concerning for small bowel obstruction. No free air. Lower lumbar spinal fusion. Surgical clips right upper   quadrant.   XR Abdomen Port 1 View    Narrative    EXAM: XR ABDOMEN PORT 1 VIEW  LOCATION: [facility]  DATE: 6/27/2024    INDICATION: Nasogastric tube placement.   COMPARISON: Abdominal radiograph 6/27/2024 6:46 PM.       Impression    IMPRESSION:     Gastric drainage tube is appropriately positioned, with tip and side-port in the expected location of the proximal stomach.    Cholecystectomy clips.    Multiple dilated gas-filled bowel loops within the upper abdomen appear similar.   CT Abdomen Pelvis w Contrast    Narrative    CT ABDOMEN PELVIS WITH CONTRAST 6/28/2024 10:09 AM    CLINICAL HISTORY: Abdominal pain. Worsening nausea, abdominal pain,  question enterocolitis vs bowel obstruction.    TECHNIQUE: CT scan of the abdomen and pelvis was performed following  injection of IV contrast. Multiplanar reformats were obtained. Dose  reduction techniques were used.  CONTRAST: 98mL Isovue-370    COMPARISON: June 23, 2024    FINDINGS:   LOWER CHEST: Minimal pleural fluid bilaterally. Mild probable  dependent atelectasis vs. less likely infiltrate.    HEPATOBILIARY: No significant mass or bile duct dilatation.  Cholecystectomy.     PANCREAS: No significant mass, duct dilatation, or inflammatory  change.    SPLEEN: Normal size.    ADRENAL  GLANDS: No significant nodules.    KIDNEYS/BLADDER: No significant mass, stones, or hydronephrosis.  Low-attenuation subcentimeter renal lesion(s). These are compatible  small benign cysts and no specific imaging evaluation or follow-up is  recommended.    BOWEL: Diffuse small bowel dilatation with a transition in the deep  pelvis compatible with mechanical small bowel obstruction. Mesenteric  edema and fluid are present, ischemia is not excluded. Gastric bypass  change.    PELVIC ORGANS: No pelvic masses.    ADDITIONAL FINDINGS: No free intraperitoneal air or abscess. There are  minimal atherosclerotic changes of the visualized aorta and its  branches. There is no evidence of aortic dissection or aneurysm.    MUSCULOSKELETAL: No frankly destructive bony lesions.      Impression    IMPRESSION:   1.  Mechanical small bowel obstruction with a transition in the deep  central pelvis.  2.  Mesenteric edema and fluid are present, a component of ischemia is  not excluded.    BILL FRANK MD         SYSTEM ID:  [id number]           Disclaimer: This note consists of symbols derived from keyboarding, dictation and/or voice recognition software. As a result, there may be errors in the script that have gone undetected. Please consider this when interpreting information found in this chart.

## 2024-07-05 NOTE — PROGRESS NOTES
"CLINICAL NUTRITION SERVICES - REASSESSMENT NOTE  Re-consulted by hospitalist and surgery PA for \"short gut syndrome with h/o bariatric surgery\" and \"loose stool, s/p small bowel resection, history of gastric bypass\".     Recommendations:   - See below, low fiber/small frequent meals per MD teams.  Suggested liquids between meals and limiting added sugar liquids like ice creams, juices, sherbets, etc. in order to help with diarrhea and urgency.     MALNUTRITION:  % Weight Loss:  None noted --> PTA  % Intake:  Decreased intake does not meet criteria for malnutrition   Subcutaneous Fat Loss:  None observed   Muscle Loss:  None observed  Fluid Retention: None documented     Malnutrition Diagnosis: Patient does not meet two of the above criteria necessary for diagnosing malnutrition         EVALUATION OF PROGRESS TOWARD GOALS   Diet: Low fiber/small, frequent meals    Intake/Tolerance: Diet advanced from clears to fulls on 7/02, solids since 7/02.  Consistently ordering meals, mostly liquids despite diet advancement to solids.  There are no flowsheet recordings.      Met with patient and  in room.  She reports being distressed w/ high frequency of diarrhea, but that it has improved over the past few days.  She is wondering if there are any nutrition recommendations that could help.  Yoselin reports she still takes her bariatric vitamins/minerals and we discussed possibly having liquids between meals (as opposed to with meals), watching out for liquids that have added sugars that may cause dumping-like sx.  Provided with low fiber handout per request, emphasized small/frequent meals, and brainstormed meal ideas for the home setting.        ASSESSED NUTRITION NEEDS PER APPROVED PRACTICE GUIDELINES:     Dosing Weight 85 kg - most accurate (standing scale wt)?  Estimated Energy Needs: >/=1589 kcals - Wili Rodney  Justification: maintenance w/ activity factor >/=1.2  Estimated Protein Needs: >/=102 grams protein - " ">/=1.2 g pro/Kg  Justification: post-op and preservation of lean body mass  Estimated Fluid Needs: per MD      NEW FINDINGS:   Medications:   - Culturell   - Imodium TID    Labs: Reviewed.     Stooling: Patterns reviewed w/ high frequency patterns beginning 7/01.  MD notes indicate diarrhea may be d/t \"postoperative stat and/or antibiotics\", C. Diff was negative.    Weight: Really cannot tell what true wts are.  Wts ranging greatly during admission between standing scale wts vs bed scale wts and sometimes fluctuating >10# over a period of days.  There is no current documentation of edema:  Vitals:    06/28/24 1817 06/29/24 0553 07/01/24 0549 07/03/24 0515   Weight: 98.6 kg (217 lb 6 oz) 89.1 kg (196 lb 6.9 oz) 92 kg (202 lb 13.2 oz) 84.4 kg (186 lb 1.6 oz)    07/05/24 0610   Weight: 79.5 kg (175 lb 3.2 oz)       Previous goals:   Diet advancement to solids w/in 24-48 hrs.  Evaluation: Met    PO intakes of at least 50-75% of meals or snacks TID.  Evaluation: Met    Previous nutrition diagnosis:   Inadequate oral intake related to altered GI function pre- and post-op as evidenced by NPO/clears meeting <50% of nutrition needs x 7 day admit.    Evaluation: Improving    Current nutrition diagnosis:  Predicted suboptimal nutrient intake (energy/protein) related to potential for decline in PO intakes pending appetite, diarrhea.    INTERVENTIONS  Recommendations / Nutrition Prescription  See above.    Implementation  Collaboration and Referral of Nutrition care: Discussed POC with team during rounds and checked in w/ RN on unit.    Current goals:  PO intakes of at least 75% meals or snacks TID.     MONITORING AND EVALUATION  Progress towards goals will be monitored and evaluated per protocol and practice guidelines.      Paige Schwab RDN, LD  Clinical Dietitian  3rd floor/ICU: [phone]  All other floors: [phone]  Weekend/holiday: [phone]  Office: [phone]      "

## 2024-07-05 NOTE — PROGRESS NOTES
[facility]    Hospitalist Progress Note  Name: Yoselin Zelaya    MRN: [id number]  Provider: Ade Lester MD  Date of Service: 07/05/2024    Assessment & Plan   Summary of Stay: Yoselin Zelaya is a 73 year old female who was admitted on 6/23/2024. Patient with a history of chronic pain, PHIL, and previous gastric bypass surgery presented with abdominal pain and was found to have a small bowel obstruction causing bowel ischemia and perforation, requiring an ex lap with small bowel resection on 06/28. Postoperatively, she is advancing her diet, with the NG tube removed on 07/01, and can switch from IV to oral antibiotics. She is experiencing diarrhea, likely due to the postoperative state or antibiotics, C. difficile negative. Her peritonitis due to bowel perforation is being managed with a switch from Zosyn to Augmentin and ciprofloxacin, with a planned 14-day course. Other managed issues include chronic neuropathy, hypokalemia, hypophosphatemia, resolving hyponatremia, and PHIL/MDD, with follow-ups planned for a pulmonary nodule and nocturnal hypoxia.     7/3/2024 general surgery is following and patient surgically feels ready for discharge however continues to have 20+ loose stools , patient is weak and dizzy and has hypokalemia.    7/4/2024: Still has many loose stools about 5 since morning.  Overall feels it is slowing down and improving.  Feels weak.    7/5: Frequency of stool has improved.  General surgery added Imodium to the regimen.  Also consulted nutritionist.  Patient has history of bypass surgery concerning for possible short gut syndrome.  Patient has had trouble making it to the bathroom.  They have 2-1/2-hour ride home.  Although clinically is improving but is still quite frequent.  Will try antidiarrheal medications and reassess.  If improvement later today patient will discharge today    Small Bowel Obstruction Causing Bowel Ischemia  -Presents with abdominal pain and found to  have a small bowel obstruction potentially causing bowel ischemia  -During surgery, evidence of adhesion causing obstruction, bowel ischemia, and perforation s/p bowel resection  -Now on low fiber diet  -Pain regimen in place  -General surgery following, appreciate recommendations     Diarrhea  Patient with history of bypass bariatric surgery.  Concern for possible short gut syndrome after recent surgery.  Nutritional consult started on an sterile medication  -Negative for C. Difficile  -Supportive care for now, would avoid antidiarrheal meds if we can       Peritonitis  -Due to bowel perforation  -Cx from surgery polymicrobial, originally on Zosyn  -E. coli resistant to Zosyn, no single agent to cover all of these organisms so started on concurrent cipro  PLAN  -Zosyn --> Augmentin, Ciprofloxacin IV --> Oral, 14d course should be sufficient      Other issues  -Chronic neuropathy: Continue home gabapentin  -Hypokalemia: Replacement protocol  -Hypophosphatemia: Replacement protocol  -Hyponatremia: Resolving  -PHIL and MDD: Home medications  -Pulmonary nodule: Follow-up with PCP as an outpatient  -Nocturnal hypoxia: Will refer for sleep study as an outpatient at discharge         DVT Prophylaxis: Enoxaparin (Lovenox) SQ  Code Status: Full Code    Disposition: Expected discharge in 1 to 2 days when diarrhea improves and electrolytes are stable      Interval History   Reviewed chart.  She is still with multiple episodes of loose stools at times not making it to the bathroom.  That is very frustrating.  However she is overall improving and feels that the frequency is less so than before.  Does not have any abdominal pain nausea or vomiting.  Had detailed conversation about all options of using antidiarrheal medications and dietary changes and possibly using cholestyramine.  More than 10 point review of system was carried out was otherwise negative.    Time spent direct patient care coordination of care is more than 50  minutes  -Data reviewed today: I reviewed all new labs and imaging reports over the last 24 hours. I personally reviewed no images or EKG's today.    Physical Exam   Temp: 97.8  F (36.6  C) Temp src: Oral BP: (!) 152/66 Pulse: 93   Resp: 16 SpO2: 92 % O2 Device: None (Room air)    Vitals:    07/01/24 0549 07/03/24 0515 07/05/24 0610   Weight: 92 kg (202 lb 13.2 oz) 84.4 kg (186 lb 1.6 oz) 79.5 kg (175 lb 3.2 oz)     Vital Signs with Ranges  Temp:  [97.8  F (36.6  C)-98.8  F (37.1  C)] 97.8  F (36.6  C)  Pulse:  [82-93] 93  Resp:  [16] 16  BP: (144-152)/(58-66) 152/66  SpO2:  [92 %-93 %] 92 %  No intake/output data recorded.      GEN:  Alert, oriented x 3, appears comfortable, NAD.  HEENT:  Normocephalic/atraumatic, no scleral icterus, no nasal discharge, mouth moist.  CV:  Regular rate and rhythm, no murmur or JVD.  S1 + S2 noted, no S3 or S4.  LUNGS:  Clear to auscultation bilaterally without rales/rhonchi/wheezing/retractions.  Symmetric chest rise on inhalation noted.  ABD:  Active bowel sounds, soft, minimal abdominal tenderness.  No rebound/guarding/rigidity.  EXT:  No edema.  No cyanosis.  No joint synovitis noted.  SKIN:  Dry to touch, no exanthems noted in the visualized areas.    Medications   Current Facility-Administered Medications   Medication Dose Route Frequency Provider Last Rate Last Admin     Current Facility-Administered Medications   Medication Dose Route Frequency Provider Last Rate Last Admin    acetaminophen (TYLENOL) oral liquid 975 mg  975 mg Oral Q6H Miah Self MD   975 mg at 07/05/24 0559    dicyclomine (BENTYL) capsule 10 mg  10 mg Oral 4x Daily Enoch Duarte MD   10 mg at 07/05/24 1244    enoxaparin ANTICOAGULANT (LOVENOX) injection 40 mg  40 mg Subcutaneous Q24H Enoch Duarte MD   40 mg at 07/04/24 1735    gabapentin (NEURONTIN) capsule 900 mg  900 mg Oral TID Enoch Duarte MD   900 mg at 07/05/24 1301    lactobacillus rhamnosus (GG) (CULTURELL) capsule 1  "capsule  1 capsule Oral BID Enoch Duarte MD   1 capsule at 07/05/24 0938    loperamide (IMODIUM) capsule 2 mg  2 mg Oral 4x Daily Nir Woodard PA-C   2 mg at 07/05/24 1244    saccharomyces boulardii (FLORASTOR) capsule 250 mg  250 mg Oral BID Nir Woodard PA-C   250 mg at 07/05/24 1301    sertraline (ZOLOFT) tablet 100 mg  100 mg Oral QAM Enoch Duarte MD   100 mg at 07/05/24 0943    sodium chloride (PF) 0.9% PF flush 3 mL  3 mL Intracatheter Q8H Enoch Duarte MD   3 mL at 07/05/24 0939     Data     Recent Labs   Lab 07/04/24  0613 07/03/24  0610 07/02/24  0709   WBC 10.7 9.6 11.2*   HGB 10.3* 9.2* 9.3*   HCT 31.6* 28.6* 27.5*   MCV 92 90 89   * 496* 450     Recent Labs   Lab 07/05/24  0644 07/04/24  0613 07/03/24  1146 07/03/24  0610    135  --  135   POTASSIUM 3.7 3.6 3.5 3.2*   CHLORIDE 101 97*  --  96*   CO2 24 23  --  24   ANIONGAP 12 15  --  15   * 103*  --  99   BUN 4.9* 2.2*  --  1.7*   CR 0.64 0.57  --  0.56   GFRESTIMATED >90 >90  --  >90   ERICK 8.6* 8.5*  --  8.2*     7-Day Micro Results       Collected Updated Procedure Result Status      07/02/2024 1046 07/02/2024 1514 C. difficile Toxin B PCR with reflex to C. difficile Antigen and Toxins A/B EIA [[id number]]    Stool from Per Rectum    Final result Component Value   C Difficile Toxin B by PCR Negative   A negative result does not exclude actual disease due to C. difficile and may be due to improper collection, handling and storage of the specimen or the number of organisms in the specimen is below the detection limit of the assay.                  No results for input(s): \"SED\", \"CRP\" in the last 168 hours.  GFR Estimate   Date Value Ref Range Status   07/05/2024 >90 >60 mL/min/1.73m2 Final     Comment:     eGFR calculated using 2021 CKD-EPI equation.   07/04/2024 >90 >60 mL/min/1.73m2 Final     Comment:     eGFR calculated using 2021 CKD-EPI equation.   07/03/2024 >90 >60 mL/min/1.73m2 " "Final     Comment:     eGFR calculated using 2021 CKD-EPI equation.     No results found for: \"GFRESTBLACK\"  Recent Labs   Lab 07/05/24  0644 07/04/24  0613 07/03/24  0610 07/02/24  0709 07/01/24  0726   * 103* 99 104* 96     Recent Labs   Lab 07/04/24  0613 07/03/24  0610 07/02/24  0709   HGB 10.3* 9.2* 9.3*     No results for input(s): \"AST\", \"ALT\", \"GGT\", \"ALKPHOS\", \"BILITOTAL\", \"BILICONJ\", \"BILIDIRECT\", \"KAYLIN\" in the last 168 hours.    Invalid input(s): \"BILIRUBININDIRECT\"  No results for input(s): \"INR\" in the last 168 hours.  No results for input(s): \"LACT\" in the last 168 hours.    No results for input(s): \"LIPASE\" in the last 168 hours.  Recent Labs   Lab 07/05/24  0644 07/04/24  0613 07/03/24  0610   BUN 4.9* 2.2* 1.7*   CR 0.64 0.57 0.56     No results for input(s): \"TSH\" in the last 168 hours.  No results for input(s): \"TROPONIN\", \"TROPI\", \"TROPR\", \"TROPONINIS\" in the last 168 hours.    Invalid input(s): \"TROPT\", \"TROP\", \"TROPONINIES\", \"TNIH\"  No results for input(s): \"COLOR\", \"APPEARANCE\", \"URINEGLC\", \"URINEBILI\", \"URINEKETONE\", \"SG\", \"UBLD\", \"URINEPH\", \"PROTEIN\", \"UROBILINOGEN\", \"NITRITE\", \"LEUKEST\", \"RBCU\", \"WBCU\" in the last 168 hours.    No results found for this or any previous visit (from the past 24 hour(s)).       "

## 2024-07-06 VITALS
BODY MASS INDEX: 31.04 KG/M2 | HEART RATE: 85 BPM | SYSTOLIC BLOOD PRESSURE: 154 MMHG | WEIGHT: 175.2 LBS | DIASTOLIC BLOOD PRESSURE: 62 MMHG | OXYGEN SATURATION: 93 % | RESPIRATION RATE: 18 BRPM | TEMPERATURE: 98.9 F | HEIGHT: 63 IN

## 2024-07-06 LAB
ANION GAP SERPL CALCULATED.3IONS-SCNC: 10 MMOL/L (ref 7–15)
BUN SERPL-MCNC: 5.3 MG/DL (ref 8–23)
CALCIUM SERPL-MCNC: 8.6 MG/DL (ref 8.8–10.2)
CHLORIDE SERPL-SCNC: 100 MMOL/L (ref 98–107)
CREAT SERPL-MCNC: 0.6 MG/DL (ref 0.51–0.95)
DEPRECATED HCO3 PLAS-SCNC: 24 MMOL/L (ref 22–29)
EGFRCR SERPLBLD CKD-EPI 2021: >90 ML/MIN/1.73M2
GLUCOSE SERPL-MCNC: 109 MG/DL (ref 70–99)
MAGNESIUM SERPL-MCNC: 2.1 MG/DL (ref 1.7–2.3)
PHOSPHATE SERPL-MCNC: 3.6 MG/DL (ref 2.5–4.5)
POTASSIUM SERPL-SCNC: 3.9 MMOL/L (ref 3.4–5.3)
SODIUM SERPL-SCNC: 134 MMOL/L (ref 135–145)

## 2024-07-06 PROCEDURE — 250N000013 HC RX MED GY IP 250 OP 250 PS 637: Performed by: PHYSICIAN ASSISTANT

## 2024-07-06 PROCEDURE — 250N000013 HC RX MED GY IP 250 OP 250 PS 637: Performed by: STUDENT IN AN ORGANIZED HEALTH CARE EDUCATION/TRAINING PROGRAM

## 2024-07-06 PROCEDURE — 83735 ASSAY OF MAGNESIUM: CPT | Performed by: INTERNAL MEDICINE

## 2024-07-06 PROCEDURE — 84100 ASSAY OF PHOSPHORUS: CPT | Performed by: INTERNAL MEDICINE

## 2024-07-06 PROCEDURE — 250N000013 HC RX MED GY IP 250 OP 250 PS 637: Performed by: INTERNAL MEDICINE

## 2024-07-06 PROCEDURE — 99239 HOSP IP/OBS DSCHRG MGMT >30: CPT | Performed by: INTERNAL MEDICINE

## 2024-07-06 PROCEDURE — 36415 COLL VENOUS BLD VENIPUNCTURE: CPT | Performed by: INTERNAL MEDICINE

## 2024-07-06 PROCEDURE — 80048 BASIC METABOLIC PNL TOTAL CA: CPT | Performed by: INTERNAL MEDICINE

## 2024-07-06 RX ORDER — ACETAMINOPHEN 325 MG/1
975 TABLET ORAL EVERY 6 HOURS
Status: SHIPPED
Start: 2024-07-06

## 2024-07-06 RX ORDER — ACETAMINOPHEN 325 MG/1
975 TABLET ORAL EVERY 6 HOURS
Status: DISCONTINUED | OUTPATIENT
Start: 2024-07-06 | End: 2024-07-06 | Stop reason: HOSPADM

## 2024-07-06 RX ADMIN — DICYCLOMINE HYDROCHLORIDE 10 MG: 10 CAPSULE ORAL at 08:56

## 2024-07-06 RX ADMIN — Medication 1 CAPSULE: at 08:56

## 2024-07-06 RX ADMIN — LOPERAMIDE HYDROCHLORIDE 2 MG: 2 CAPSULE ORAL at 08:56

## 2024-07-06 RX ADMIN — ACETAMINOPHEN 975 MG: 325 TABLET, FILM COATED ORAL at 12:25

## 2024-07-06 RX ADMIN — HYDROXYZINE HYDROCHLORIDE 50 MG: 50 TABLET, FILM COATED ORAL at 00:23

## 2024-07-06 RX ADMIN — ACETAMINOPHEN 975 MG: 160 SOLUTION ORAL at 00:23

## 2024-07-06 RX ADMIN — DICYCLOMINE HYDROCHLORIDE 10 MG: 10 CAPSULE ORAL at 12:26

## 2024-07-06 RX ADMIN — Medication 250 MG: at 08:56

## 2024-07-06 RX ADMIN — SERTRALINE 100 MG: 100 TABLET, FILM COATED ORAL at 08:56

## 2024-07-06 RX ADMIN — LOPERAMIDE HYDROCHLORIDE 2 MG: 2 CAPSULE ORAL at 12:25

## 2024-07-06 RX ADMIN — ACETAMINOPHEN 975 MG: 325 TABLET, FILM COATED ORAL at 06:52

## 2024-07-06 RX ADMIN — GABAPENTIN 900 MG: 300 CAPSULE ORAL at 08:55

## 2024-07-06 ASSESSMENT — ACTIVITIES OF DAILY LIVING (ADL)
ADLS_ACUITY_SCORE: 25
ADLS_ACUITY_SCORE: 26
ADLS_ACUITY_SCORE: 26
ADLS_ACUITY_SCORE: 25
ADLS_ACUITY_SCORE: 25
ADLS_ACUITY_SCORE: 26
ADLS_ACUITY_SCORE: 25
ADLS_ACUITY_SCORE: 25
ADLS_ACUITY_SCORE: 26
ADLS_ACUITY_SCORE: 25
ADLS_ACUITY_SCORE: 25
ADLS_ACUITY_SCORE: 26
ADLS_ACUITY_SCORE: 25

## 2024-07-06 NOTE — PROGRESS NOTES
Care Management Discharge Note    Discharge Date: 07/06/2024       Discharge Disposition: Home    Discharge Services: None    Discharge DME: None    Discharge Transportation: family or friend will provide    Private pay costs discussed: Not applicable    Patient/family educated on Medicare website which has current facility and service quality ratings: yes    Education Provided on the Discharge Plan: Yes  Persons Notified of Discharge Plans: Patient and spouse  Patient/Family in Agreement with the Plan: yes    Handoff Referral Completed: No    Additional Information:  Met with patient and spouse this am to discuss discharge planning and home PT recommendations. Patient has been ambulating frequently with spouse in the halls and was able to do stairs with PT on 7/3. Independent in her room.  Patient and spouse do not feel that home PT is needed any more as patient has regained much of her independence. They are hoping to be able to discharge home today. They feel they have lots of support at home with family and neighbors.  [facility] had accepted and notified them to cancel referral.          Henny Muniz RN BSN OCN  Care Coordinator  [facility]  [phone]

## 2024-07-06 NOTE — PROGRESS NOTES
"[facility]   General Surgery Progress Note          Assessment and Plan:   Assessment:   POD#8 s/p diagnostic laparoscopy converted to laparotomy, lysis of adhesions and small bowel resection. Adhesive band in the pelvis causing complete SBO causing perforation.  -Postoperative ileus as expected - resolved (+BMs)  -Diarrhea, improving, C diff negative      Plan:   -Diet: low res diet, 6 small meals  -Discontinue antibiotics  -Imodium  -Nutrition consulted and following, appreciate input  -Pain Management: tylenol  -DVT prophylaxis: lovenox  -Increase activity as tolerated, sit up in chair and walk halls as able today  -Dispo: Meeting surgical criteria for discharge. Possible discharge today. Staples out late next week. Discharge instructions in chart.         Interval History:   Sitting up in chair, doing very well.          Physical Exam:   Blood pressure (!) 154/62, pulse 85, temperature 98.9  F (37.2  C), temperature source Oral, resp. rate 18, height 1.6 m (5' 3\"), weight 79.5 kg (175 lb 3.2 oz), SpO2 93%.    I/O last 3 completed shifts:  In: 440 [P.O.:440]  Out: -     Abdomen: soft, mildly distended, non-tender  Inc(s) - incisions c/d/I, staples in place, no drainage or erythema          Data:     Recent Labs   Lab 07/04/24  0613 07/03/24  0610 07/02/24  0709   WBC 10.7 9.6 11.2*   HGB 10.3* 9.2* 9.3*   HCT 31.6* 28.6* 27.5*   MCV 92 90 89   * 496* 450         Recent Labs   Lab 07/06/24  0632 07/05/24  0644 07/04/24  0613   * 137 135   POTASSIUM 3.9 3.7 3.6   CHLORIDE 100 101 97*   CO2 24 24 23   ANIONGAP 10 12 15   * 122* 103*   BUN 5.3* 4.9* 2.2*   CR 0.60 0.64 0.57   GFRESTIMATED >90 >90 >90   ERICK 8.6* 8.6* 8.5*          Nir Woodard PA-C  Text page [phone] 8am-4pm  After 4pm call [phone]        "

## 2024-07-06 NOTE — DISCHARGE SUMMARY
[facility]  Discharge Summary  Hospitalist      Date of Admission:  6/23/2024  Date of Discharge:  7/6/2024  Provider:  Ade Lester MD  Date of Service (when I last saw the patient): 07/06/24      Primary Provider: Arvind Aquino          Discharge Diagnosis:     Discharge Diagnoses   Small Bowel Obstruction Causing Bowel Ischemia   Diarrhea   Peritonitis     Other medical issues:  Past Medical History:   Diagnosis Date    Depressive disorder     Hypertension     PONV (postoperative nausea and vomiting)           History of Present Illness   Yoselin Zelaya is an 73 year old female who presented with abdominal pain.  Please see the admission history and physical for full details.    Hospital Course     Yoselin Zelaya is a 73 year old female who was admitted on 6/23/2024. Patient with a history of chronic pain, PHIL, and previous gastric bypass surgery presented with abdominal pain and was found to have a small bowel obstruction causing bowel ischemia and perforation, requiring an ex lap with small bowel resection on 06/28. Postoperatively, she is advancing her diet, with the NG tube removed on 07/01, and can switch from IV to oral antibiotics. She is experiencing diarrhea, likely due to the postoperative state or antibiotics, C. difficile negative. Her peritonitis due to bowel perforation is being managed with a switch from Zosyn to Augmentin and ciprofloxacin, with a planned 14-day course. Other managed issues include chronic neuropathy, hypokalemia, hypophosphatemia, resolving hyponatremia, and PHIL/MDD, with follow-ups planned for a pulmonary nodule and nocturnal hypoxia. .  The following problems were addressed during her hospitalization:    Small Bowel Obstruction Causing Bowel Ischemia  -Presents with abdominal pain and found to have a small bowel obstruction potentially causing bowel ischemia  -During surgery, evidence of adhesion causing obstruction, bowel ischemia,  and perforation s/p bowel resection  -Now on low fiber diet  -Pain regimen in place  -Follow-up with surgery as outpatient     Diarrhea  -Patient with loose stools improving in frequency.  She does have history of bypass surgery and possibly concern for short gut syndrome.  -Added Imodium per surgery  -Nutrition consult.  Added Florastor  -Negative for C. Difficile  -Supportive care  -Follow-up with PCP in 3 to 5 days with repeat basic metabolic panel        Peritonitis  -Due to bowel perforation  -Cx from surgery polymicrobial, originally on Zosyn  -E. coli resistant to Zosyn, no single agent to cover all of these organisms so started on concurrent cipro  PLAN  -Zosyn --> Augmentin, Ciprofloxacin IV --> Oral, 14d course should be sufficient off antibiotics now     Other issues  -Chronic neuropathy: Continue home gabapentin  -Hypokalemia: Replacement protocol  -Hypophosphatemia: Replacement protocol  -Hyponatremia: Resolving  -PHIL and MDD: Home medications  -Pulmonary nodule: Follow-up with PCP as an outpatient  -Nocturnal hypoxia: Will refer for sleep study as an outpatient at discharge          Significant Results and Procedures   As noted above    Pending Results   Unresulted Labs Ordered in the Past 30 Days of this Admission       No orders found from 5/24/2024 to 6/24/2024.            Code Status   Full Code       Primary Care Physician   Arvind Aquino    Physical Exam   Temp: 98.9  F (37.2  C) Temp src: Oral BP: (!) 154/62 Pulse: 85   Resp: 18 SpO2: 93 % O2 Device: None (Room air)    Vitals:    07/01/24 0549 07/03/24 0515 07/05/24 0610   Weight: 92 kg (202 lb 13.2 oz) 84.4 kg (186 lb 1.6 oz) 79.5 kg (175 lb 3.2 oz)     Vital Signs with Ranges  Temp:  [97.8  F (36.6  C)-98.9  F (37.2  C)] 98.9  F (37.2  C)  Pulse:  [85-97] 85  Resp:  [16-19] 18  BP: (146-154)/(62-63) 154/62  SpO2:  [90 %-93 %] 93 %  I/O last 3 completed shifts:  In: 440 [P.O.:440]  Out: -     GEN:  Alert, oriented x 3, appears comfortable,  NAD.  HEENT:  Normocephalic/atraumatic, no scleral icterus, no nasal discharge, mouth moist.  CV:  Regular rate and rhythm, no murmur or JVD.  S1 + S2 noted, no S3 or S4.  LUNGS:  Clear to auscultation bilaterally without rales/rhonchi/wheezing/retractions.  Symmetric chest rise on inhalation noted.  ABD:  Active bowel sounds, soft, minimal abdominal tenderness.  No rebound/guarding/rigidity.  EXT:  No edema.  No cyanosis.  No joint synovitis noted.  SKIN:  Dry to touch, no exanthems noted in the visualized areas.      Discharge Disposition   Discharged to home    Consultations This Hospital Stay   ADVANCE DIRECTIVE IP CONSULT  SURGERY GENERAL IP CONSULT  PHYSICAL THERAPY ADULT IP CONSULT  ADVANCE DIRECTIVE IP CONSULT  CARE MANAGEMENT / SOCIAL WORK IP CONSULT  SPIRITUAL HEALTH SERVICES IP CONSULT  NUTRITION SERVICES ADULT IP CONSULT  NUTRITION SERVICES ADULT IP CONSULT  ADVANCE DIRECTIVE IP CONSULT  ADVANCE DIRECTIVE IP CONSULT    Time Spent on this Encounter   I, Ade Lester MD, personally saw the patient today and spent greater than 30 minutes discharging this patient.    Discharge Orders      Reason for your hospital stay    Please refer to discharge summary.     Follow-up and recommended labs and tests     Follow up with primary care provider, Arvind Aquino, within 3-5 days for hospital follow- up.  The following labs/tests are recommended: Basic metabolic panel for ongoing diarrhea.    Follow-up with surgery as scheduled  Please call, if there are any new or worsening symptoms     Activity    Your activity upon discharge: activity as tolerated     Diet    Follow this diet upon discharge: Orders Placed This Encounter      Combination Diet Low Fiber, Six Small Feedings Adult     Discharge Medications   Current Discharge Medication List        START taking these medications    Details   dicyclomine (BENTYL) 10 MG capsule Take 1 capsule (10 mg) by mouth 4 times daily for 30 days  Qty: 120 capsule,  Refills: 0    Associated Diagnoses: Diarrhea, unspecified type      lactobacillus rhamnosus, GG, (CULTURELL) capsule Take 1 capsule by mouth 2 times daily for 30 days    Associated Diagnoses: Diarrhea, unspecified type      loperamide (IMODIUM) 2 MG capsule Take 1 capsule (2 mg) by mouth 4 times daily for 7 days  Qty: 28 capsule, Refills: 0    Associated Diagnoses: Diarrhea, unspecified type      saccharomyces boulardii (FLORASTOR) 250 MG capsule Take 1 capsule (250 mg) by mouth 2 times daily for 30 days  Qty: 60 capsule, Refills: 0    Associated Diagnoses: Diarrhea, unspecified type           CONTINUE these medications which have CHANGED    Details   acetaminophen (TYLENOL) 325 MG tablet Take 3 tablets (975 mg) by mouth every 6 hours    Associated Diagnoses: Enterocolitis; Small bowel obstruction (H); Diarrhea, unspecified type           CONTINUE these medications which have NOT CHANGED    Details   Ascorbic Acid 250 MG CHEW Take 500 mg by mouth every morning      calcium-vitamin D-vitamin K (CALCIUM SOFT CHEWS) 500-500-40 MG-UNT-MCG CHEW Take 2 tablets by mouth every morning      gabapentin (NEURONTIN) 300 MG capsule Take 900 mg by mouth 3 times daily      hydrOXYzine HCl (ATARAX) 50 MG tablet Take 50 mg by mouth every 6 hours as needed      propranolol (INDERAL) 20 MG tablet Take 20 mg by mouth 3 times daily as needed (anxiety)      sertraline (ZOLOFT) 100 MG tablet Take 100 mg by mouth every morning           Allergies   Allergies   Allergen Reactions    Latex      Data   Most Recent 3 CBC's:  Recent Labs   Lab Test 07/04/24  0613 07/03/24  0610 07/02/24  0709   WBC 10.7 9.6 11.2*   HGB 10.3* 9.2* 9.3*   MCV 92 90 89   * 496* 450      Most Recent 3 BMP's:  Recent Labs   Lab Test 07/06/24  0632 07/05/24  0644 07/04/24  0613   * 137 135   POTASSIUM 3.9 3.7 3.6   CHLORIDE 100 101 97*   CO2 24 24 23   BUN 5.3* 4.9* 2.2*   CR 0.60 0.64 0.57   ANIONGAP 10 12 15   ERICK 8.6* 8.6* 8.5*   * 122* 103*      Most Recent 2 LFT's:  Recent Labs   Lab Test 06/23/24  0834   AST 21   ALT 13   ALKPHOS 63   BILITOTAL 0.2     Most Recent INR's and Anticoagulation Dosing History:  Anticoagulation Dose History           No data to display              Most Recent 3 Troponin's:No lab results found.  Most Recent Cholesterol Panel:No lab results found.  Most Recent 6 Bacteria Isolates From Any Culture (See EPIC Reports for Culture Details):No lab results found.  Most Recent TSH, T4 and A1c Labs:  Recent Labs   Lab Test 06/24/24  0648   A1C 5.5     Results for orders placed or performed during the hospital encounter of 06/23/24   Abd/pelvis CT,  IV  contrast only TRAUMA / AAA    Narrative    EXAM: CT ABDOMEN PELVIS W CONTRAST  LOCATION: [facility]  DATE: 6/23/2024    INDICATION: Gen abd pain, hx of patrice en y, NVD  COMPARISON: None.  TECHNIQUE: CT scan of the abdomen and pelvis was performed following injection of IV contrast. Multiplanar reformats were obtained. Dose reduction techniques were used.  CONTRAST: 90mL Isovue 370    FINDINGS:   LOWER CHEST: A few small nodules in the right middle lobe measuring 2-3 mm (series 3, image 9-20). Bilateral breast prosthesis.    HEPATOBILIARY: No focal lesions in the liver. Cholecystectomy. Mild intrahepatic and extra hepatic biliary ductal dilatation, likely related to postcholecystectomy reservoir effect.    PANCREAS: Normal.    SPLEEN: Normal.    ADRENAL GLANDS: Normal.    KIDNEYS/BLADDER: Normal.    BOWEL: There is a cluster of the cord edematous loops of small bowel and a few mildly dilated loops of small bowel in the mid pelvis with bowel loops maximally dilated up to 3.4 cm. Surrounding mesenteric edema and fluid in the surrounding mesentery.   Normal caliber loops of small bowel in the upper abdomen and normal caliber of the distal and terminal ileum. No discrete transition point. Normal caliber of the colon. Mild circumferential colonic wall thickening and  trace fluid around the colon,   predominantly involving the descending colon and sigmoid colon. Findings may be related to nonspecific enterocolitis. Soren-en-Y gastric bypass.    LYMPH NODES: No lymphadenopathy.    VASCULATURE: No abdominal aortic aneurysm.    PELVIC ORGANS: Hysterectomy. Small amount of free fluid in the pelvis.  Small amount of free fluid around the right hepatic lobe.    MUSCULOSKELETAL: Bilateral breast prosthesis. Degenerative changes in the spine. Instrumented fusion in the lumbar spine. No suspicious lesions in the bones.      Impression    IMPRESSION:   1.  Findings of acute enterocolitis with a few thick-walled edematous and mildly dilated loops of small bowel in the pelvis, and a few segments of colon with wall thickening. Mesenteric edema and small amount of free fluid in the pelvis and right upper   quadrant. Findings may be due to infectious or inflammatory enterocolitis.  2.  Few small pulmonary nodules measuring up to 3 mm in the right middle lobe. Consider a follow-up chest CT in 12 months.   XR Chest Port 1 View    Narrative    EXAM: XR CHEST PORT 1 VIEW  LOCATION: [facility]  DATE: 6/26/2024    INDICATION: hypoxia  COMPARISON: CT abdomen 6/23/2024.      Impression    IMPRESSION: Low lung volumes and bibasilar atelectasis. No pleural fluid or pneumothorax. Normal heart size and pulmonary vascularity. Plate and screw fixation of the cervical spine. Bilateral breast implants.   XR Abdomen 1 View    Narrative    ABDOMEN ONE VIEW   6/26/2024 11:17 AM     HISTORY: Ileus vs small bowel obstruction.    COMPARISON: CT 6/23/2024.      Impression    IMPRESSION: Multiple dilated proximal to mid small bowel loops appear  increased in the interval. Decompressed colon. Findings may represent  bowel obstruction. No free air identified. Lumbosacral fusion.    KAYLA BYRD MD         SYSTEM ID:  [id number]   XR Abdomen Port 1 View    Narrative    ABDOMEN PORTABLE ONE VIEW    6/26/2024 3:02 PM     HISTORY: Abdominal pain. Enterocolitis.    COMPARISON: 6/26/2024.      Impression    IMPRESSION: Nasogastric tube tip within the gastric lumen. Multiple  dilated gas-filled bowel loops again identified.    KAYLA BYRD MD         SYSTEM ID:  [id number]   XR Abdomen 1 View    Narrative    EXAM: XR ABDOMEN 1 VIEW  LOCATION: [facility]  DATE: 6/27/2024    INDICATION: increasing abdominal pain, bloating  COMPARISON: Abdominal x-ray 6/26/2024 and CT abdomen and pelvis 6/23/2024      Impression    IMPRESSION: Numerous dilated loops of air-filled small bowel again seen within the mid and lower abdomen similar to the previous study and concerning for small bowel obstruction. No free air. Lower lumbar spinal fusion. Surgical clips right upper   quadrant.   XR Abdomen Port 1 View    Narrative    EXAM: XR ABDOMEN PORT 1 VIEW  LOCATION: [facility]  DATE: 6/27/2024    INDICATION: Nasogastric tube placement.   COMPARISON: Abdominal radiograph 6/27/2024 6:46 PM.       Impression    IMPRESSION:     Gastric drainage tube is appropriately positioned, with tip and side-port in the expected location of the proximal stomach.    Cholecystectomy clips.    Multiple dilated gas-filled bowel loops within the upper abdomen appear similar.   CT Abdomen Pelvis w Contrast    Narrative    CT ABDOMEN PELVIS WITH CONTRAST 6/28/2024 10:09 AM    CLINICAL HISTORY: Abdominal pain. Worsening nausea, abdominal pain,  question enterocolitis vs bowel obstruction.    TECHNIQUE: CT scan of the abdomen and pelvis was performed following  injection of IV contrast. Multiplanar reformats were obtained. Dose  reduction techniques were used.  CONTRAST: 98mL Isovue-370    COMPARISON: June 23, 2024    FINDINGS:   LOWER CHEST: Minimal pleural fluid bilaterally. Mild probable  dependent atelectasis vs. less likely infiltrate.    HEPATOBILIARY: No significant mass or bile duct  dilatation.  Cholecystectomy.     PANCREAS: No significant mass, duct dilatation, or inflammatory  change.    SPLEEN: Normal size.    ADRENAL GLANDS: No significant nodules.    KIDNEYS/BLADDER: No significant mass, stones, or hydronephrosis.  Low-attenuation subcentimeter renal lesion(s). These are compatible  small benign cysts and no specific imaging evaluation or follow-up is  recommended.    BOWEL: Diffuse small bowel dilatation with a transition in the deep  pelvis compatible with mechanical small bowel obstruction. Mesenteric  edema and fluid are present, ischemia is not excluded. Gastric bypass  change.    PELVIC ORGANS: No pelvic masses.    ADDITIONAL FINDINGS: No free intraperitoneal air or abscess. There are  minimal atherosclerotic changes of the visualized aorta and its  branches. There is no evidence of aortic dissection or aneurysm.    MUSCULOSKELETAL: No frankly destructive bony lesions.      Impression    IMPRESSION:   1.  Mechanical small bowel obstruction with a transition in the deep  central pelvis.  2.  Mesenteric edema and fluid are present, a component of ischemia is  not excluded.    BILL FRANK MD         SYSTEM ID:  [id number]           Disclaimer: This note consists of symbols derived from keyboarding, dictation and/or voice recognition software. As a result, there may be errors in the script that have gone undetected. Please consider this when interpreting information found in this chart.

## 2024-07-06 NOTE — PLAN OF CARE
"Inpatient progress note [id number]  A&O x4, VSS, UAL in room. States she has not had a liquid BM since evening time, immodium helping. Able to walk in halls. ML inc w/ 2 laps w/ staples CDI, SUZETTE. Tylenol given for pain. Encourage ambulation & po intake. Possibly home Saturday or Sunday.       Goal Outcome Evaluation:      Plan of Care Reviewed With: patient    Overall Patient Progress: improvingOverall Patient Progress: improving           Problem: Adult Inpatient Plan of Care  Goal: Plan of Care Review  Description: The Plan of Care Review/Shift note should be completed every shift.  The Outcome Evaluation is a brief statement about your assessment that the patient is improving, declining, or no change.  This information will be displayed automatically on your shift  note.  Outcome: Progressing  Flowsheets (Taken 7/6/2024 0829)  Plan of Care Reviewed With: patient  Overall Patient Progress: improving  Goal: Patient-Specific Goal (Individualized)  Description: You can add care plan individualizations to a care plan. Examples of Individualization might be:  \"Parent requests to be called daily at 9am for status\", \"I have a hard time hearing out of my right ear\", or \"Do not touch me to wake me up as it startles  me\".  Outcome: Progressing  Goal: Absence of Hospital-Acquired Illness or Injury  Outcome: Progressing  Intervention: Identify and Manage Fall Risk  Recent Flowsheet Documentation  Taken 7/6/2024 0025 by Ester Noland, RN  Safety Promotion/Fall Prevention:   clutter free environment maintained   lighting adjusted   mobility aid in reach   nonskid shoes/slippers when out of bed  Intervention: Prevent Skin Injury  Recent Flowsheet Documentation  Taken 7/6/2024 0025 by Ester Noland, RN  Body Position: position changed independently  Taken 7/6/2024 0023 by Ester Noland, RN  Body Position: position changed independently  Goal: Optimal Comfort and Wellbeing  Outcome: Progressing  Goal: Readiness for Transition " of Care  Outcome: Progressing     Problem: Bowel Disease, Inflammatory (Ulcerative Colitis or Crohn's Disease)  Goal: Optimal Adaptation to Chronic Illness  Outcome: Progressing  Goal: Absence of Infection Signs and Symptoms  Outcome: Progressing  Goal: Optimal Nutrition Delivery  Outcome: Progressing  Goal: Optimal Pain Control and Function  Outcome: Progressing     Problem: Comorbidity Management  Goal: Blood Glucose Levels Within Targeted Range  Outcome: Progressing

## 2024-07-06 NOTE — PLAN OF CARE
"BP (!) 146/63 (BP Location: Left arm)   Pulse 97   Temp 97.8  F (36.6  C) (Oral)   Resp 18   Ht 1.6 m (5' 3\")   Wt 79.5 kg (175 lb 3.2 oz)   SpO2 93%   BMI 31.04 kg/m      General - Pt alert and decision making. VSS on RA.  Neuro - AxOx4  Resp - WDL  Cards - WDL  GI - x. Staples on abdomen WNL, no swelling or tenderness. On scheduled imodium for diarrheal episodes. Denies diarrhea on shift since starting imodium. Denies pain. Slight tender pain with movement or coughing per pt.  Pt did request to take PO tylenol over liquid suspension; cross-cover paged   - WDL  IV SL    Plan -- home tomorrow.      Goal Outcome Evaluation:      Plan of Care Reviewed With: patient    Overall Patient Progress: improvingOverall Patient Progress: improving    Outcome Evaluation: Denies loose stools on Imodium, denies n/v/d, reports slight tender pain in abdomen with movement      Problem: Adult Inpatient Plan of Care  Goal: Plan of Care Review  Description: The Plan of Care Review/Shift note should be completed every shift.  The Outcome Evaluation is a brief statement about your assessment that the patient is improving, declining, or no change.  This information will be displayed automatically on your shift  note.  Outcome: Progressing  Flowsheets (Taken 7/5/2024 2046)  Outcome Evaluation: Denies loose stools on Imodium, denies n/v/d, reports slight tender pain in abdomen with movement  Plan of Care Reviewed With: patient  Overall Patient Progress: improving  Goal: Patient-Specific Goal (Individualized)  Description: You can add care plan individualizations to a care plan. Examples of Individualization might be:  \"Parent requests to be called daily at 9am for status\", \"I have a hard time hearing out of my right ear\", or \"Do not touch me to wake me up as it startles  me\".  Outcome: Progressing  Goal: Absence of Hospital-Acquired Illness or Injury  Outcome: Progressing  Intervention: Identify and Manage Fall Risk  Recent " Flowsheet Documentation  Taken 7/5/2024 1711 by Katelin Henry, RN  Safety Promotion/Fall Prevention:   clutter free environment maintained   lighting adjusted   mobility aid in reach   nonskid shoes/slippers when out of bed  Intervention: Prevent Infection  Recent Flowsheet Documentation  Taken 7/5/2024 1711 by Katelin Henry RN  Infection Prevention: hand hygiene promoted  Goal: Optimal Comfort and Wellbeing  Outcome: Progressing  Intervention: Monitor Pain and Promote Comfort  Recent Flowsheet Documentation  Taken 7/5/2024 1932 by Katelin Henry, RN  Pain Management Interventions:   care clustered   ambulation/increased activity  Taken 7/5/2024 1711 by Katelin Henry RN  Pain Management Interventions: medication (see MAR)  Goal: Readiness for Transition of Care  Outcome: Progressing     Problem: Bowel Disease, Inflammatory (Ulcerative Colitis or Crohn's Disease)  Goal: Optimal Adaptation to Chronic Illness  Outcome: Progressing  Goal: Absence of Infection Signs and Symptoms  Outcome: Progressing  Goal: Optimal Nutrition Delivery  Outcome: Progressing  Goal: Optimal Pain Control and Function  Outcome: Progressing  Intervention: Prevent or Manage Pain  Recent Flowsheet Documentation  Taken 7/5/2024 1932 by Katelin Henry RN  Pain Management Interventions:   care clustered   ambulation/increased activity  Taken 7/5/2024 1711 by Katelin Henry, RN  Pain Management Interventions: medication (see MAR)     Problem: Comorbidity Management  Goal: Blood Glucose Levels Within Targeted Range  Outcome: Progressing  Intervention: Monitor and Manage Glycemia  Recent Flowsheet Documentation  Taken 7/5/2024 1711 by Katelin Henry, RN  Medication Review/Management: medications reviewed

## 2024-07-06 NOTE — PLAN OF CARE
DISCHARGE NOTE    Patient discharged to home at 1:20 PM via wheel chair. Accompanied by spouse and staff. Discharge instructions reviewed with patient and spouse, opportunity offered to ask questions. Prescriptions sent to patients preferred pharmacy. All belongings sent with patient.    Problem: Adult Inpatient Plan of Care  Goal: Absence of Hospital-Acquired Illness or Injury  Intervention: Identify and Manage Fall Risk  Recent Flowsheet Documentation  Taken 7/6/2024 0857 by Micheal Tran RN  Safety Promotion/Fall Prevention:   clutter free environment maintained   lighting adjusted   mobility aid in reach   nonskid shoes/slippers when out of bed  Intervention: Prevent Skin Injury  Recent Flowsheet Documentation  Taken 7/6/2024 0857 by Micheal Tran RN  Body Position: position changed independently  Intervention: Prevent and Manage VTE (Venous Thromboembolism) Risk  Recent Flowsheet Documentation  Taken 7/6/2024 0857 by Micheal Tran RN  VTE Prevention/Management: (ambulatory) SCDs off (sequential compression devices)  Intervention: Prevent Infection  Recent Flowsheet Documentation  Taken 7/6/2024 0857 by Micheal Tran RN  Infection Prevention: hand hygiene promoted   Goal Outcome Evaluation:      Plan of Care Reviewed With: patient    Overall Patient Progress: no changeOverall Patient Progress: no change    Outcome Evaluation: loose stools, denies N/V. denies pain, declined scheduled tylenol       (2) cough or sneeze

## 2024-10-10 ENCOUNTER — TRANSFERRED RECORDS (OUTPATIENT)
Dept: HEALTH INFORMATION MANAGEMENT | Facility: CLINIC | Age: 74
End: 2024-10-10
Payer: MEDICARE

## 2024-10-14 ENCOUNTER — TRANSCRIBE ORDERS (OUTPATIENT)
Dept: CALL CENTER | Age: 74
End: 2024-10-14
Payer: MEDICARE

## 2024-10-14 DIAGNOSIS — R19.7 DIARRHEA OF PRESUMED INFECTIOUS ORIGIN: Primary | ICD-10-CM

## 2024-10-22 NOTE — TELEPHONE ENCOUNTER
REFERRAL INFORMATION:  Referring Provider:  Arvind Aquino MD   Referring Clinic:  [facility].   Reason for Visit/Diagnosis: Diarrhea of presumed infectious origin      FUTURE VISIT INFORMATION:  Appointment Date: 12/20/2024  Appointment Time:      NOTES STATUS DETAILS   OFFICE NOTE from Referring Provider Care Everywhere [facility]:   10/10/2024, 8/19/2024 OV with MARIANNA Aquino   OFFICE NOTE from Other Specialist N/A    HOSPITAL DISCHARGE SUMMARY/  ED VISITS N/A    OPERATIVE REPORT Care Everywhere 6/28/2024 DIAGNOSTIC LAPAROSCOPY, (Abdomen)   laparatomy, lysis of adhesions, (Abdomen)   Small Bowel Resection, creation of primary side-to-side small bowel anastomosis (Abdomen)    MEDICATION LIST Internal         ENDOSCOPY  N/A    COLONOSCOPY N/A    IMAGING (CT, MRI, EGD, MRCP, Small Bowel Follow Through/SBT, MR/CT Enterography) Internal 6/28/2024 CT ABD PEL   6/27/2024 XR ABD   6/26/2024 XR ABD   6/23/2024 CT ABD PEL

## 2024-12-20 ENCOUNTER — PRE VISIT (OUTPATIENT)
Dept: GASTROENTEROLOGY | Facility: CLINIC | Age: 74
End: 2024-12-20

## 2025-01-30 ENCOUNTER — APPOINTMENT (OUTPATIENT)
Dept: URBAN - METROPOLITAN AREA CLINIC 164 | Facility: CLINIC | Age: 75
Setting detail: DERMATOLOGY
End: 2025-01-30

## 2025-01-30 DIAGNOSIS — L81.4 OTHER MELANIN HYPERPIGMENTATION: ICD-10-CM

## 2025-01-30 DIAGNOSIS — L82.1 OTHER SEBORRHEIC KERATOSIS: ICD-10-CM

## 2025-01-30 DIAGNOSIS — D18.0 HEMANGIOMA: ICD-10-CM

## 2025-01-30 DIAGNOSIS — Z12.83 ENCOUNTER FOR SCREENING FOR MALIGNANT NEOPLASM OF SKIN: ICD-10-CM

## 2025-01-30 DIAGNOSIS — D22 MELANOCYTIC NEVI: ICD-10-CM

## 2025-01-30 PROBLEM — D18.01 HEMANGIOMA OF SKIN AND SUBCUTANEOUS TISSUE: Status: ACTIVE | Noted: 2025-01-30

## 2025-01-30 PROBLEM — D22.5 MELANOCYTIC NEVI OF TRUNK: Status: ACTIVE | Noted: 2025-01-30

## 2025-01-30 PROCEDURE — ? FULL BODY SKIN EXAM

## 2025-01-30 PROCEDURE — 99213 OFFICE O/P EST LOW 20 MIN: CPT

## 2025-01-30 PROCEDURE — ? COUNSELING

## 2025-01-30 PROCEDURE — ? ADDITIONAL NOTES

## 2025-01-30 ASSESSMENT — LOCATION ZONE DERM: LOCATION ZONE: TRUNK

## 2025-01-30 ASSESSMENT — LOCATION DETAILED DESCRIPTION DERM
LOCATION DETAILED: PERIUMBILICAL SKIN
LOCATION DETAILED: RIGHT SUPERIOR UPPER BACK
LOCATION DETAILED: RIGHT MID-UPPER BACK
LOCATION DETAILED: EPIGASTRIC SKIN
LOCATION DETAILED: LEFT MID-UPPER BACK

## 2025-01-30 ASSESSMENT — LOCATION SIMPLE DESCRIPTION DERM
LOCATION SIMPLE: LEFT UPPER BACK
LOCATION SIMPLE: RIGHT UPPER BACK
LOCATION SIMPLE: ABDOMEN

## 2025-08-12 DIAGNOSIS — Z90.49 HISTORY OF RESECTION OF SMALL BOWEL: ICD-10-CM

## 2025-08-12 DIAGNOSIS — R19.7 DIARRHEA DUE TO MALABSORPTION: ICD-10-CM

## 2025-08-12 DIAGNOSIS — K90.9 DIARRHEA DUE TO MALABSORPTION: ICD-10-CM

## 2025-08-12 RX ORDER — CHOLESTYRAMINE 4 G/9G
2 POWDER, FOR SUSPENSION ORAL 2 TIMES DAILY WITH MEALS
Qty: 368 G | Refills: 0 | Status: SHIPPED | OUTPATIENT
Start: 2025-08-12 | End: 2025-08-13

## 2025-08-13 ENCOUNTER — TELEPHONE (OUTPATIENT)
Dept: GASTROENTEROLOGY | Facility: CLINIC | Age: 75
End: 2025-08-13
Payer: MEDICARE

## 2025-08-13 RX ORDER — CHOLESTYRAMINE 4 G/9G
2 POWDER, FOR SUSPENSION ORAL 2 TIMES DAILY WITH MEALS
Qty: 368 G | Refills: 5 | Status: SHIPPED | OUTPATIENT
Start: 2025-08-13

## 2025-08-28 ENCOUNTER — VIRTUAL VISIT (OUTPATIENT)
Dept: GASTROENTEROLOGY | Facility: CLINIC | Age: 75
End: 2025-08-28
Payer: MEDICARE

## 2025-08-28 DIAGNOSIS — R19.7 DIARRHEA DUE TO MALABSORPTION: ICD-10-CM

## 2025-08-28 DIAGNOSIS — Z98.84 HISTORY OF GASTRIC BYPASS: Primary | ICD-10-CM

## 2025-08-28 DIAGNOSIS — K90.9 DIARRHEA DUE TO MALABSORPTION: ICD-10-CM

## 2025-08-28 DIAGNOSIS — Z90.49 HX OF CHOLECYSTECTOMY: ICD-10-CM

## 2025-08-28 DIAGNOSIS — R14.0 ABDOMINAL BLOATING: ICD-10-CM

## 2025-08-28 DIAGNOSIS — Z90.49 HISTORY OF RESECTION OF SMALL BOWEL: ICD-10-CM

## 2025-08-28 RX ORDER — SIMETHICONE 80 MG
80 TABLET,CHEWABLE ORAL EVERY 6 HOURS PRN
Qty: 100 TABLET | Refills: 5 | Status: SHIPPED | OUTPATIENT
Start: 2025-08-28

## 2025-08-28 RX ORDER — LOPERAMIDE HYDROCHLORIDE 2 MG/1
2 CAPSULE ORAL 3 TIMES DAILY PRN
Qty: 50 CAPSULE | Refills: 5 | Status: SHIPPED | OUTPATIENT
Start: 2025-08-28

## (undated) DEVICE — SOL NACL 0.9% IRRIG 3000ML BAG 2B7477

## (undated) DEVICE — BAG CLEAR TRASH 1.3M 39X33" P4040C

## (undated) DEVICE — ESU GROUND PAD ADULT W/CORD E7507

## (undated) DEVICE — TUBING SUCTION 12"X1/4" N612

## (undated) DEVICE — LINEN HALF SHEET 5512

## (undated) DEVICE — ESU CORD MONOPOLAR 10'  E0510

## (undated) DEVICE — ESU ELEC BLADE 2.75" COATED/INSULATED E1455

## (undated) DEVICE — CATH TRAY FOLEY SURESTEP 16FR W/URINE MTR STATLK LF A303416A

## (undated) DEVICE — SU SILK 3-0 SH CR 8X18" C013D

## (undated) DEVICE — GLOVE BIOGEL PI MICRO INDICATOR UNDERGLOVE SZ 8.0 48980

## (undated) DEVICE — SPONGE LAP 18X18" X8435

## (undated) DEVICE — SU VICRYL 4-0 PS-2 18" UND J496H

## (undated) DEVICE — BNDG ABDOMINAL BINDER 9X45-62" 79-89071

## (undated) DEVICE — DRSG GAUZE 4X4" TRAY

## (undated) DEVICE — GLOVE BIOGEL PI MICRO SZ 7.5 48575

## (undated) DEVICE — STPL RELOAD LINEAR CUT 75MM TCR75

## (undated) DEVICE — STPL SKIN 35W 6.9MM  PXW35

## (undated) DEVICE — LINEN FULL SHEET 5511

## (undated) DEVICE — LINEN POUCH DBL 5427

## (undated) DEVICE — MANIFOLD NEPTUNE 4 PORT 700-20

## (undated) DEVICE — ENDO GELPORT 100/120MM C8XX2

## (undated) DEVICE — SU VICRYL 0 UR-6 27" J603H

## (undated) DEVICE — ESU LIGASURE IMPACT OPEN SEALER/DVDR CVD LG JAW LF4418

## (undated) DEVICE — DRAPE IOBAN INCISE 23X17" 6650EZ

## (undated) DEVICE — ESU PENCIL W/HOLSTER E2350H

## (undated) DEVICE — ENDO TROCAR BLUNT TIP KII BALLOON 12X100MM C0R47

## (undated) DEVICE — DRSG ABDOMINAL 07 1/2X8" 7197D

## (undated) DEVICE — SU PDS II 0 CTX 60" Z990G

## (undated) DEVICE — Device

## (undated) DEVICE — SUCTION IRR STRYKERFLOW II W/TIP 250-070-520

## (undated) DEVICE — SUCTION TIP POOLE K770

## (undated) DEVICE — ENDO TROCAR SLEEVE KII Z-THREADED 05X100MM CTS02

## (undated) DEVICE — ENDO TROCAR FIRST ENTRY KII FIOS Z-THRD 05X100MM CTF03

## (undated) DEVICE — STPL LINEAR CUT 75MM TLC75

## (undated) DEVICE — SUCTION TIP YANKAUER W/O VENT K86

## (undated) RX ORDER — CEFAZOLIN SODIUM/WATER 2 G/20 ML
SYRINGE (ML) INTRAVENOUS
Status: DISPENSED
Start: 2024-06-28

## (undated) RX ORDER — GLYCOPYRROLATE 0.2 MG/ML
INJECTION, SOLUTION INTRAMUSCULAR; INTRAVENOUS
Status: DISPENSED
Start: 2024-06-28

## (undated) RX ORDER — FENTANYL CITRATE 50 UG/ML
INJECTION, SOLUTION INTRAMUSCULAR; INTRAVENOUS
Status: DISPENSED
Start: 2024-06-28

## (undated) RX ORDER — ONDANSETRON 2 MG/ML
INJECTION INTRAMUSCULAR; INTRAVENOUS
Status: DISPENSED
Start: 2024-06-28

## (undated) RX ORDER — BUPIVACAINE HYDROCHLORIDE AND EPINEPHRINE 2.5; 5 MG/ML; UG/ML
INJECTION, SOLUTION EPIDURAL; INFILTRATION; INTRACAUDAL; PERINEURAL
Status: DISPENSED
Start: 2024-06-28

## (undated) RX ORDER — DEXAMETHASONE SODIUM PHOSPHATE 4 MG/ML
INJECTION, SOLUTION INTRA-ARTICULAR; INTRALESIONAL; INTRAMUSCULAR; INTRAVENOUS; SOFT TISSUE
Status: DISPENSED
Start: 2024-06-28

## (undated) RX ORDER — FENTANYL CITRATE-0.9 % NACL/PF 10 MCG/ML
PLASTIC BAG, INJECTION (ML) INTRAVENOUS
Status: DISPENSED
Start: 2024-06-28

## (undated) RX ORDER — LIDOCAINE HYDROCHLORIDE 10 MG/ML
INJECTION, SOLUTION EPIDURAL; INFILTRATION; INTRACAUDAL; PERINEURAL
Status: DISPENSED
Start: 2024-06-28

## (undated) RX ORDER — PROPOFOL 10 MG/ML
INJECTION, EMULSION INTRAVENOUS
Status: DISPENSED
Start: 2024-06-28

## (undated) RX ORDER — ALBUTEROL SULFATE 0.83 MG/ML
SOLUTION RESPIRATORY (INHALATION)
Status: DISPENSED
Start: 2024-06-28